# Patient Record
Sex: FEMALE | Race: WHITE | Employment: OTHER
[De-identification: names, ages, dates, MRNs, and addresses within clinical notes are randomized per-mention and may not be internally consistent; named-entity substitution may affect disease eponyms.]

---

## 2018-01-01 ENCOUNTER — HOME CARE VISIT (OUTPATIENT)
Dept: SCHEDULING | Facility: HOME HEALTH | Age: 83
End: 2018-01-01
Payer: MEDICARE

## 2018-01-01 ENCOUNTER — HOSPICE ADMISSION (OUTPATIENT)
Dept: HOSPICE | Facility: HOSPICE | Age: 83
End: 2018-01-01
Payer: MEDICARE

## 2018-01-01 ENCOUNTER — HOME CARE VISIT (OUTPATIENT)
Dept: HOME HEALTH SERVICES | Facility: HOME HEALTH | Age: 83
End: 2018-01-01
Payer: MEDICARE

## 2018-01-01 ENCOUNTER — APPOINTMENT (OUTPATIENT)
Dept: ULTRASOUND IMAGING | Age: 83
DRG: 871 | End: 2018-01-01
Attending: INTERNAL MEDICINE
Payer: MEDICARE

## 2018-01-01 ENCOUNTER — APPOINTMENT (OUTPATIENT)
Dept: GENERAL RADIOLOGY | Age: 83
DRG: 871 | End: 2018-01-01
Attending: INTERNAL MEDICINE
Payer: MEDICARE

## 2018-01-01 ENCOUNTER — HOME CARE VISIT (OUTPATIENT)
Dept: HOSPICE | Facility: HOSPICE | Age: 83
End: 2018-01-01
Payer: MEDICARE

## 2018-01-01 ENCOUNTER — APPOINTMENT (OUTPATIENT)
Dept: GENERAL RADIOLOGY | Age: 83
DRG: 871 | End: 2018-01-01
Attending: EMERGENCY MEDICINE
Payer: MEDICARE

## 2018-01-01 ENCOUNTER — HOME HEALTH ADMISSION (OUTPATIENT)
Dept: HOME HEALTH SERVICES | Facility: HOME HEALTH | Age: 83
End: 2018-01-01
Payer: MEDICARE

## 2018-01-01 ENCOUNTER — HOSPITAL ENCOUNTER (INPATIENT)
Age: 83
LOS: 8 days | Discharge: HOSPICE | DRG: 871 | End: 2018-03-02
Attending: EMERGENCY MEDICINE | Admitting: HOSPITALIST
Payer: MEDICARE

## 2018-01-01 VITALS
TEMPERATURE: 98.2 F | HEART RATE: 90 BPM | DIASTOLIC BLOOD PRESSURE: 50 MMHG | SYSTOLIC BLOOD PRESSURE: 80 MMHG | OXYGEN SATURATION: 82 %

## 2018-01-01 VITALS
SYSTOLIC BLOOD PRESSURE: 110 MMHG | DIASTOLIC BLOOD PRESSURE: 56 MMHG | RESPIRATION RATE: 14 BRPM | HEART RATE: 88 BPM | OXYGEN SATURATION: 97 % | TEMPERATURE: 97.2 F

## 2018-01-01 VITALS
SYSTOLIC BLOOD PRESSURE: 118 MMHG | HEART RATE: 94 BPM | TEMPERATURE: 98.6 F | OXYGEN SATURATION: 72 % | DIASTOLIC BLOOD PRESSURE: 70 MMHG

## 2018-01-01 VITALS
DIASTOLIC BLOOD PRESSURE: 63 MMHG | OXYGEN SATURATION: 94 % | SYSTOLIC BLOOD PRESSURE: 116 MMHG | HEART RATE: 102 BPM | TEMPERATURE: 99.1 F

## 2018-01-01 VITALS
OXYGEN SATURATION: 97 % | TEMPERATURE: 98.2 F | RESPIRATION RATE: 16 BRPM | DIASTOLIC BLOOD PRESSURE: 56 MMHG | HEART RATE: 88 BPM | SYSTOLIC BLOOD PRESSURE: 110 MMHG

## 2018-01-01 VITALS
OXYGEN SATURATION: 95 % | HEART RATE: 86 BPM | DIASTOLIC BLOOD PRESSURE: 78 MMHG | SYSTOLIC BLOOD PRESSURE: 147 MMHG | RESPIRATION RATE: 18 BRPM | TEMPERATURE: 97.5 F | HEIGHT: 65 IN | WEIGHT: 108.5 LBS | BODY MASS INDEX: 18.08 KG/M2

## 2018-01-01 VITALS
OXYGEN SATURATION: 91 % | RESPIRATION RATE: 16 BRPM | SYSTOLIC BLOOD PRESSURE: 120 MMHG | TEMPERATURE: 97.1 F | DIASTOLIC BLOOD PRESSURE: 60 MMHG | HEART RATE: 67 BPM

## 2018-01-01 VITALS
OXYGEN SATURATION: 98 % | HEART RATE: 88 BPM | SYSTOLIC BLOOD PRESSURE: 116 MMHG | DIASTOLIC BLOOD PRESSURE: 80 MMHG | TEMPERATURE: 98.5 F

## 2018-01-01 VITALS
OXYGEN SATURATION: 95 % | DIASTOLIC BLOOD PRESSURE: 60 MMHG | TEMPERATURE: 99.3 F | SYSTOLIC BLOOD PRESSURE: 103 MMHG | HEART RATE: 88 BPM

## 2018-01-01 DIAGNOSIS — R79.89 ELEVATED BRAIN NATRIURETIC PEPTIDE (BNP) LEVEL: ICD-10-CM

## 2018-01-01 DIAGNOSIS — R57.9 SHOCK (HCC): ICD-10-CM

## 2018-01-01 DIAGNOSIS — R50.9 FEVER, UNSPECIFIED FEVER CAUSE: Primary | ICD-10-CM

## 2018-01-01 DIAGNOSIS — R77.8 ELEVATED TROPONIN: ICD-10-CM

## 2018-01-01 DIAGNOSIS — R94.31 ABNORMAL EKG: ICD-10-CM

## 2018-01-01 DIAGNOSIS — F03.91 DEMENTIA WITH BEHAVIORAL DISTURBANCE, UNSPECIFIED DEMENTIA TYPE: Chronic | ICD-10-CM

## 2018-01-01 DIAGNOSIS — I95.9 HYPOTENSION, UNSPECIFIED HYPOTENSION TYPE: ICD-10-CM

## 2018-01-01 DIAGNOSIS — D72.829 LEUKOCYTOSIS, UNSPECIFIED TYPE: ICD-10-CM

## 2018-01-01 DIAGNOSIS — R53.81 DEBILITY: ICD-10-CM

## 2018-01-01 DIAGNOSIS — J90 PLEURAL EFFUSION, LEFT: ICD-10-CM

## 2018-01-01 DIAGNOSIS — Z71.89 ACP (ADVANCE CARE PLANNING): ICD-10-CM

## 2018-01-01 DIAGNOSIS — R74.02 ELEVATED SERUM LACTATE DEHYDROGENASE: ICD-10-CM

## 2018-01-01 DIAGNOSIS — R53.1 GENERALIZED WEAKNESS: ICD-10-CM

## 2018-01-01 DIAGNOSIS — I82.412 ACUTE DEEP VEIN THROMBOSIS (DVT) OF FEMORAL VEIN OF LEFT LOWER EXTREMITY (HCC): ICD-10-CM

## 2018-01-01 LAB
ALBUMIN SERPL-MCNC: 1.4 G/DL (ref 3.4–5)
ALBUMIN SERPL-MCNC: 1.4 G/DL (ref 3.4–5)
ALBUMIN SERPL-MCNC: 1.5 G/DL (ref 3.4–5)
ALBUMIN SERPL-MCNC: 1.5 G/DL (ref 3.4–5)
ALBUMIN SERPL-MCNC: 1.7 G/DL (ref 3.4–5)
ALBUMIN SERPL-MCNC: 1.8 G/DL (ref 3.4–5)
ALBUMIN SERPL-MCNC: 2 G/DL (ref 3.4–5)
ALBUMIN/GLOB SERPL: 0.4 {RATIO} (ref 0.8–1.7)
ALBUMIN/GLOB SERPL: 0.4 {RATIO} (ref 0.8–1.7)
ALBUMIN/GLOB SERPL: 0.5 {RATIO} (ref 0.8–1.7)
ALBUMIN/GLOB SERPL: 0.6 {RATIO} (ref 0.8–1.7)
ALP SERPL-CCNC: 119 U/L (ref 45–117)
ALP SERPL-CCNC: 128 U/L (ref 45–117)
ALP SERPL-CCNC: 129 U/L (ref 45–117)
ALP SERPL-CCNC: 130 U/L (ref 45–117)
ALP SERPL-CCNC: 155 U/L (ref 45–117)
ALP SERPL-CCNC: 165 U/L (ref 45–117)
ALP SERPL-CCNC: 193 U/L (ref 45–117)
ALT SERPL-CCNC: 13 U/L (ref 13–56)
ALT SERPL-CCNC: 15 U/L (ref 13–56)
ALT SERPL-CCNC: 16 U/L (ref 13–56)
ALT SERPL-CCNC: 17 U/L (ref 13–56)
ALT SERPL-CCNC: 27 U/L (ref 13–56)
AMORPH CRY URNS QL MICRO: ABNORMAL
ANION GAP SERPL CALC-SCNC: 10 MMOL/L (ref 3–18)
ANION GAP SERPL CALC-SCNC: 12 MMOL/L (ref 3–18)
ANION GAP SERPL CALC-SCNC: 13 MMOL/L (ref 3–18)
ANION GAP SERPL CALC-SCNC: 8 MMOL/L (ref 3–18)
ANION GAP SERPL CALC-SCNC: 8 MMOL/L (ref 3–18)
ANION GAP SERPL CALC-SCNC: 9 MMOL/L (ref 3–18)
APPEARANCE UR: ABNORMAL
APPEARANCE UR: CLEAR
APTT PPP: 110.8 SEC (ref 23–36.4)
APTT PPP: 112 SEC (ref 23–36.4)
APTT PPP: 117.6 SEC (ref 23–36.4)
APTT PPP: 124.2 SEC (ref 23–36.4)
APTT PPP: 136.9 SEC (ref 23–36.4)
APTT PPP: 150.3 SEC (ref 23–36.4)
APTT PPP: 34.6 SEC (ref 23–36.4)
APTT PPP: 55.3 SEC (ref 23–36.4)
APTT PPP: 58.2 SEC (ref 23–36.4)
APTT PPP: 88.9 SEC (ref 23–36.4)
APTT PPP: 89.3 SEC (ref 23–36.4)
APTT PPP: 92.6 SEC (ref 23–36.4)
APTT PPP: >180 SEC (ref 23–36.4)
ARTERIAL PATENCY WRIST A: YES
AST SERPL-CCNC: 24 U/L (ref 15–37)
AST SERPL-CCNC: 24 U/L (ref 15–37)
AST SERPL-CCNC: 25 U/L (ref 15–37)
AST SERPL-CCNC: 25 U/L (ref 15–37)
AST SERPL-CCNC: 26 U/L (ref 15–37)
AST SERPL-CCNC: 32 U/L (ref 15–37)
AST SERPL-CCNC: 54 U/L (ref 15–37)
ATRIAL RATE: 105 BPM
ATRIAL RATE: 19 BPM
ATRIAL RATE: 96 BPM
BACTERIA SPEC CULT: NORMAL
BACTERIA URNS QL MICRO: ABNORMAL /HPF
BACTERIA URNS QL MICRO: ABNORMAL /HPF
BASE DEFICIT BLDV-SCNC: 4 MMOL/L
BASOPHILS # BLD: 0 K/UL (ref 0–0.06)
BASOPHILS # BLD: 0 K/UL (ref 0–0.1)
BASOPHILS # BLD: 0 K/UL (ref 0–0.1)
BASOPHILS NFR BLD: 0 % (ref 0–2)
BASOPHILS NFR BLD: 0 % (ref 0–3)
BASOPHILS NFR BLD: 0 % (ref 0–3)
BDY SITE: ABNORMAL
BILIRUB SERPL-MCNC: 0.2 MG/DL (ref 0.2–1)
BILIRUB SERPL-MCNC: 0.3 MG/DL (ref 0.2–1)
BILIRUB SERPL-MCNC: 0.4 MG/DL (ref 0.2–1)
BILIRUB SERPL-MCNC: 0.5 MG/DL (ref 0.2–1)
BILIRUB SERPL-MCNC: 0.6 MG/DL (ref 0.2–1)
BILIRUB UR QL: ABNORMAL
BILIRUB UR QL: NEGATIVE
BNP SERPL-MCNC: ABNORMAL PG/ML (ref 0–1800)
BODY TEMPERATURE: 99.1
BUN SERPL-MCNC: 11 MG/DL (ref 7–18)
BUN SERPL-MCNC: 16 MG/DL (ref 7–18)
BUN SERPL-MCNC: 16 MG/DL (ref 7–18)
BUN SERPL-MCNC: 21 MG/DL (ref 7–18)
BUN SERPL-MCNC: 26 MG/DL (ref 7–18)
BUN SERPL-MCNC: 29 MG/DL (ref 7–18)
BUN SERPL-MCNC: 6 MG/DL (ref 7–18)
BUN SERPL-MCNC: 7 MG/DL (ref 7–18)
BUN/CREAT SERPL: 13 (ref 12–20)
BUN/CREAT SERPL: 18 (ref 12–20)
BUN/CREAT SERPL: 18 (ref 12–20)
BUN/CREAT SERPL: 21 (ref 12–20)
BUN/CREAT SERPL: 21 (ref 12–20)
BUN/CREAT SERPL: 22 (ref 12–20)
BUN/CREAT SERPL: 23 (ref 12–20)
BUN/CREAT SERPL: 25 (ref 12–20)
CA-I SERPL-SCNC: 0.93 MMOL/L (ref 1.12–1.32)
CA-I SERPL-SCNC: 1.13 MMOL/L (ref 1.12–1.32)
CALCIUM SERPL-MCNC: 7 MG/DL (ref 8.5–10.1)
CALCIUM SERPL-MCNC: 7.1 MG/DL (ref 8.5–10.1)
CALCIUM SERPL-MCNC: 7.1 MG/DL (ref 8.5–10.1)
CALCIUM SERPL-MCNC: 7.2 MG/DL (ref 8.5–10.1)
CALCIUM SERPL-MCNC: 7.2 MG/DL (ref 8.5–10.1)
CALCIUM SERPL-MCNC: 7.5 MG/DL (ref 8.5–10.1)
CALCIUM SERPL-MCNC: 7.5 MG/DL (ref 8.5–10.1)
CALCIUM SERPL-MCNC: 7.9 MG/DL (ref 8.5–10.1)
CALCULATED P AXIS, ECG09: 75 DEGREES
CALCULATED P AXIS, ECG09: 76 DEGREES
CALCULATED R AXIS, ECG10: -56 DEGREES
CALCULATED R AXIS, ECG10: -59 DEGREES
CALCULATED R AXIS, ECG10: -60 DEGREES
CALCULATED T AXIS, ECG11: 63 DEGREES
CALCULATED T AXIS, ECG11: 81 DEGREES
CALCULATED T AXIS, ECG11: 95 DEGREES
CHLORIDE SERPL-SCNC: 106 MMOL/L (ref 100–108)
CHLORIDE SERPL-SCNC: 113 MMOL/L (ref 100–108)
CHLORIDE SERPL-SCNC: 115 MMOL/L (ref 100–108)
CHLORIDE SERPL-SCNC: 117 MMOL/L (ref 100–108)
CHLORIDE SERPL-SCNC: 118 MMOL/L (ref 100–108)
CHLORIDE SERPL-SCNC: 119 MMOL/L (ref 100–108)
CK MB CFR SERPL CALC: 1.8 % (ref 0–4)
CK MB CFR SERPL CALC: 2.5 % (ref 0–4)
CK MB CFR SERPL CALC: ABNORMAL % (ref 0–4)
CK MB SERPL-MCNC: 1.4 NG/ML (ref 5–25)
CK MB SERPL-MCNC: 1.6 NG/ML (ref 5–25)
CK MB SERPL-MCNC: <1 NG/ML (ref 5–25)
CK SERPL-CCNC: 47 U/L (ref 26–192)
CK SERPL-CCNC: 57 U/L (ref 26–192)
CK SERPL-CCNC: 89 U/L (ref 26–192)
CO2 SERPL-SCNC: 17 MMOL/L (ref 21–32)
CO2 SERPL-SCNC: 18 MMOL/L (ref 21–32)
CO2 SERPL-SCNC: 21 MMOL/L (ref 21–32)
CO2 SERPL-SCNC: 22 MMOL/L (ref 21–32)
CO2 SERPL-SCNC: 26 MMOL/L (ref 21–32)
COLOR UR: ABNORMAL
COLOR UR: ABNORMAL
CORTIS AM PEAK SERPL-MCNC: 14.9 UG/DL (ref 4.3–22.4)
CREAT SERPL-MCNC: 0.39 MG/DL (ref 0.6–1.3)
CREAT SERPL-MCNC: 0.48 MG/DL (ref 0.6–1.3)
CREAT SERPL-MCNC: 0.53 MG/DL (ref 0.6–1.3)
CREAT SERPL-MCNC: 0.77 MG/DL (ref 0.6–1.3)
CREAT SERPL-MCNC: 0.91 MG/DL (ref 0.6–1.3)
CREAT SERPL-MCNC: 0.92 MG/DL (ref 0.6–1.3)
CREAT SERPL-MCNC: 1.06 MG/DL (ref 0.6–1.3)
CREAT SERPL-MCNC: 1.32 MG/DL (ref 0.6–1.3)
DATE LAST DOSE: ABNORMAL
DATE LAST DOSE: ABNORMAL
DATE LAST DOSE: NORMAL
DATE LAST DOSE: NORMAL
DIAGNOSIS, 93000: NORMAL
DIFFERENTIAL METHOD BLD: ABNORMAL
EOSINOPHIL # BLD: 0.3 K/UL (ref 0–0.4)
EOSINOPHIL # BLD: 0.6 K/UL (ref 0–0.4)
EOSINOPHIL # BLD: 0.7 K/UL (ref 0–0.4)
EOSINOPHIL # BLD: 0.8 K/UL (ref 0–0.4)
EOSINOPHIL # BLD: 0.8 K/UL (ref 0–0.4)
EOSINOPHIL NFR BLD: 2 % (ref 0–5)
EOSINOPHIL NFR BLD: 4 % (ref 0–5)
EOSINOPHIL NFR BLD: 5 % (ref 0–5)
EOSINOPHIL NFR BLD: 5 % (ref 0–5)
EOSINOPHIL NFR BLD: 8 % (ref 0–5)
EPITH CASTS URNS QL MICRO: ABNORMAL /LPF (ref 0–5)
EPITH CASTS URNS QL MICRO: ABNORMAL /LPF (ref 0–5)
ERYTHROCYTE [DISTWIDTH] IN BLOOD BY AUTOMATED COUNT: 16.1 % (ref 11.6–14.5)
ERYTHROCYTE [DISTWIDTH] IN BLOOD BY AUTOMATED COUNT: 16.2 % (ref 11.6–14.5)
ERYTHROCYTE [DISTWIDTH] IN BLOOD BY AUTOMATED COUNT: 16.3 % (ref 11.6–14.5)
ERYTHROCYTE [DISTWIDTH] IN BLOOD BY AUTOMATED COUNT: 16.4 % (ref 11.6–14.5)
ERYTHROCYTE [DISTWIDTH] IN BLOOD BY AUTOMATED COUNT: 16.5 % (ref 11.6–14.5)
ERYTHROCYTE [DISTWIDTH] IN BLOOD BY AUTOMATED COUNT: 16.6 % (ref 11.6–14.5)
ERYTHROCYTE [DISTWIDTH] IN BLOOD BY AUTOMATED COUNT: 16.8 % (ref 11.6–14.5)
ERYTHROCYTE [DISTWIDTH] IN BLOOD BY AUTOMATED COUNT: 17 % (ref 11.6–14.5)
FLUAV AG NPH QL IA: NEGATIVE
FLUBV AG NOSE QL IA: NEGATIVE
GAS FLOW.O2 O2 DELIVERY SYS: ABNORMAL L/MIN
GAS FLOW.O2 SETTING OXYMISER: 3 L/M
GLOBULIN SER CALC-MCNC: 3 G/DL (ref 2–4)
GLOBULIN SER CALC-MCNC: 3.2 G/DL (ref 2–4)
GLOBULIN SER CALC-MCNC: 3.2 G/DL (ref 2–4)
GLOBULIN SER CALC-MCNC: 3.3 G/DL (ref 2–4)
GLOBULIN SER CALC-MCNC: 3.3 G/DL (ref 2–4)
GLOBULIN SER CALC-MCNC: 3.4 G/DL (ref 2–4)
GLOBULIN SER CALC-MCNC: 4.4 G/DL (ref 2–4)
GLUCOSE BLD STRIP.AUTO-MCNC: 113 MG/DL (ref 70–110)
GLUCOSE SERPL-MCNC: 106 MG/DL (ref 74–99)
GLUCOSE SERPL-MCNC: 107 MG/DL (ref 74–99)
GLUCOSE SERPL-MCNC: 114 MG/DL (ref 74–99)
GLUCOSE SERPL-MCNC: 116 MG/DL (ref 74–99)
GLUCOSE SERPL-MCNC: 70 MG/DL (ref 74–99)
GLUCOSE SERPL-MCNC: 76 MG/DL (ref 74–99)
GLUCOSE SERPL-MCNC: 78 MG/DL (ref 74–99)
GLUCOSE SERPL-MCNC: 95 MG/DL (ref 74–99)
GLUCOSE UR STRIP.AUTO-MCNC: NEGATIVE MG/DL
GLUCOSE UR STRIP.AUTO-MCNC: NEGATIVE MG/DL
HCO3 BLDV-SCNC: 21 MMOL/L (ref 23–28)
HCT VFR BLD AUTO: 23.6 % (ref 35–45)
HCT VFR BLD AUTO: 25.5 % (ref 35–45)
HCT VFR BLD AUTO: 26.3 % (ref 35–45)
HCT VFR BLD AUTO: 28.5 % (ref 35–45)
HCT VFR BLD AUTO: 29.6 % (ref 35–45)
HCT VFR BLD AUTO: 30.7 % (ref 35–45)
HCT VFR BLD AUTO: 31 % (ref 35–45)
HCT VFR BLD AUTO: 34.2 % (ref 35–45)
HGB BLD-MCNC: 11 G/DL (ref 12–16)
HGB BLD-MCNC: 7.4 G/DL (ref 12–16)
HGB BLD-MCNC: 8.1 G/DL (ref 12–16)
HGB BLD-MCNC: 8.4 G/DL (ref 12–16)
HGB BLD-MCNC: 9.3 G/DL (ref 12–16)
HGB BLD-MCNC: 9.4 G/DL (ref 12–16)
HGB BLD-MCNC: 9.7 G/DL (ref 12–16)
HGB BLD-MCNC: 9.8 G/DL (ref 12–16)
HGB UR QL STRIP: NEGATIVE
HGB UR QL STRIP: NEGATIVE
INR PPP: 1.4 (ref 0.8–1.2)
INR PPP: 2.7 (ref 0.8–1.2)
INR PPP: 3 (ref 0.8–1.2)
KETONES UR QL STRIP.AUTO: ABNORMAL MG/DL
KETONES UR QL STRIP.AUTO: NEGATIVE MG/DL
LACTATE BLD-SCNC: 2.5 MMOL/L (ref 0.4–2)
LACTATE BLD-SCNC: 3.8 MMOL/L (ref 0.4–2)
LACTATE SERPL-SCNC: 1 MMOL/L (ref 0.4–2)
LEUKOCYTE ESTERASE UR QL STRIP.AUTO: ABNORMAL
LEUKOCYTE ESTERASE UR QL STRIP.AUTO: NEGATIVE
LYMPHOCYTES # BLD: 1.6 K/UL (ref 0.9–3.6)
LYMPHOCYTES # BLD: 1.7 K/UL (ref 0.9–3.6)
LYMPHOCYTES # BLD: 2 K/UL (ref 0.8–3.5)
LYMPHOCYTES # BLD: 2.1 K/UL (ref 0.9–3.6)
LYMPHOCYTES # BLD: 2.4 K/UL (ref 0.8–3.5)
LYMPHOCYTES NFR BLD: 13 % (ref 20–51)
LYMPHOCYTES NFR BLD: 16 % (ref 20–51)
LYMPHOCYTES NFR BLD: 18 % (ref 21–52)
LYMPHOCYTES NFR BLD: 19 % (ref 21–52)
LYMPHOCYTES NFR BLD: 8 % (ref 21–52)
MAGNESIUM SERPL-MCNC: 1.7 MG/DL (ref 1.6–2.6)
MAGNESIUM SERPL-MCNC: 1.7 MG/DL (ref 1.6–2.6)
MAGNESIUM SERPL-MCNC: 2 MG/DL (ref 1.6–2.6)
MAGNESIUM SERPL-MCNC: 2.4 MG/DL (ref 1.6–2.6)
MCH RBC QN AUTO: 27 PG (ref 24–34)
MCH RBC QN AUTO: 27.4 PG (ref 24–34)
MCH RBC QN AUTO: 27.4 PG (ref 24–34)
MCH RBC QN AUTO: 27.6 PG (ref 24–34)
MCH RBC QN AUTO: 27.6 PG (ref 24–34)
MCH RBC QN AUTO: 28 PG (ref 24–34)
MCH RBC QN AUTO: 28 PG (ref 24–34)
MCH RBC QN AUTO: 28.1 PG (ref 24–34)
MCHC RBC AUTO-ENTMCNC: 31.4 G/DL (ref 31–37)
MCHC RBC AUTO-ENTMCNC: 31.6 G/DL (ref 31–37)
MCHC RBC AUTO-ENTMCNC: 31.6 G/DL (ref 31–37)
MCHC RBC AUTO-ENTMCNC: 31.8 G/DL (ref 31–37)
MCHC RBC AUTO-ENTMCNC: 31.8 G/DL (ref 31–37)
MCHC RBC AUTO-ENTMCNC: 31.9 G/DL (ref 31–37)
MCHC RBC AUTO-ENTMCNC: 32.2 G/DL (ref 31–37)
MCHC RBC AUTO-ENTMCNC: 32.6 G/DL (ref 31–37)
MCV RBC AUTO: 85.7 FL (ref 74–97)
MCV RBC AUTO: 85.8 FL (ref 74–97)
MCV RBC AUTO: 86.1 FL (ref 74–97)
MCV RBC AUTO: 86.1 FL (ref 74–97)
MCV RBC AUTO: 87.3 FL (ref 74–97)
MCV RBC AUTO: 87.5 FL (ref 74–97)
MCV RBC AUTO: 87.5 FL (ref 74–97)
MCV RBC AUTO: 88.1 FL (ref 74–97)
MONOCYTES # BLD: 0.6 K/UL (ref 0.05–1.2)
MONOCYTES # BLD: 0.6 K/UL (ref 0–1)
MONOCYTES # BLD: 0.8 K/UL (ref 0.05–1.2)
MONOCYTES # BLD: 0.9 K/UL (ref 0–1)
MONOCYTES # BLD: 1 K/UL (ref 0.05–1.2)
MONOCYTES NFR BLD: 4 % (ref 2–9)
MONOCYTES NFR BLD: 4 % (ref 3–10)
MONOCYTES NFR BLD: 6 % (ref 2–9)
MONOCYTES NFR BLD: 7 % (ref 3–10)
MONOCYTES NFR BLD: 9 % (ref 3–10)
MYELOCYTES NFR BLD MANUAL: 1 %
NEUTS SEG # BLD: 11 K/UL (ref 1.8–8)
NEUTS SEG # BLD: 12.6 K/UL (ref 1.8–8)
NEUTS SEG # BLD: 18.1 K/UL (ref 1.8–8)
NEUTS SEG # BLD: 5.7 K/UL (ref 1.8–8)
NEUTS SEG # BLD: 7.3 K/UL (ref 1.8–8)
NEUTS SEG NFR BLD: 67 % (ref 40–73)
NEUTS SEG NFR BLD: 67 % (ref 40–73)
NEUTS SEG NFR BLD: 72 % (ref 42–75)
NEUTS SEG NFR BLD: 81 % (ref 42–75)
NEUTS SEG NFR BLD: 84 % (ref 40–73)
NITRITE UR QL STRIP.AUTO: NEGATIVE
NITRITE UR QL STRIP.AUTO: NEGATIVE
O2/TOTAL GAS SETTING VFR VENT: 0.32 %
P-R INTERVAL, ECG05: 138 MS
P-R INTERVAL, ECG05: 152 MS
PCO2 BLDV: 36.7 MMHG (ref 41–51)
PH BLDV: 7.37 [PH] (ref 7.32–7.42)
PH UR STRIP: 5 [PH] (ref 5–8)
PH UR STRIP: 5 [PH] (ref 5–8)
PHOSPHATE SERPL-MCNC: 2.2 MG/DL (ref 2.5–4.9)
PHOSPHATE SERPL-MCNC: 2.3 MG/DL (ref 2.5–4.9)
PHOSPHATE SERPL-MCNC: 2.4 MG/DL (ref 2.5–4.9)
PHOSPHATE SERPL-MCNC: 2.6 MG/DL (ref 2.5–4.9)
PHOSPHATE SERPL-MCNC: 3.6 MG/DL (ref 2.5–4.9)
PLATELET # BLD AUTO: 200 K/UL (ref 135–420)
PLATELET # BLD AUTO: 212 K/UL (ref 135–420)
PLATELET # BLD AUTO: 230 K/UL (ref 135–420)
PLATELET # BLD AUTO: 233 K/UL (ref 135–420)
PLATELET # BLD AUTO: 246 K/UL (ref 135–420)
PLATELET # BLD AUTO: 249 K/UL (ref 135–420)
PLATELET # BLD AUTO: 257 K/UL (ref 135–420)
PLATELET # BLD AUTO: 261 K/UL (ref 135–420)
PMV BLD AUTO: 11.5 FL (ref 9.2–11.8)
PMV BLD AUTO: 11.8 FL (ref 9.2–11.8)
PMV BLD AUTO: 11.8 FL (ref 9.2–11.8)
PMV BLD AUTO: 12 FL (ref 9.2–11.8)
PMV BLD AUTO: 12.7 FL (ref 9.2–11.8)
PMV BLD AUTO: 12.7 FL (ref 9.2–11.8)
PMV BLD AUTO: 13.6 FL (ref 9.2–11.8)
PMV BLD AUTO: 14 FL (ref 9.2–11.8)
PO2 BLDV: 30 MMHG (ref 25–40)
POTASSIUM SERPL-SCNC: 3.4 MMOL/L (ref 3.5–5.5)
POTASSIUM SERPL-SCNC: 3.5 MMOL/L (ref 3.5–5.5)
POTASSIUM SERPL-SCNC: 3.5 MMOL/L (ref 3.5–5.5)
POTASSIUM SERPL-SCNC: 3.6 MMOL/L (ref 3.5–5.5)
POTASSIUM SERPL-SCNC: 3.6 MMOL/L (ref 3.5–5.5)
POTASSIUM SERPL-SCNC: 3.7 MMOL/L (ref 3.5–5.5)
POTASSIUM SERPL-SCNC: 3.8 MMOL/L (ref 3.5–5.5)
POTASSIUM SERPL-SCNC: 3.9 MMOL/L (ref 3.5–5.5)
POTASSIUM SERPL-SCNC: 3.9 MMOL/L (ref 3.5–5.5)
POTASSIUM SERPL-SCNC: 4.3 MMOL/L (ref 3.5–5.5)
PROCALCITONIN SERPL-MCNC: 0.89 NG/ML (ref 0–0.08)
PROT SERPL-MCNC: 4.6 G/DL (ref 6.4–8.2)
PROT SERPL-MCNC: 4.8 G/DL (ref 6.4–8.2)
PROT SERPL-MCNC: 4.9 G/DL (ref 6.4–8.2)
PROT SERPL-MCNC: 6.4 G/DL (ref 6.4–8.2)
PROT UR STRIP-MCNC: 30 MG/DL
PROT UR STRIP-MCNC: ABNORMAL MG/DL
PROTHROMBIN TIME: 17 SEC (ref 11.5–15.2)
PROTHROMBIN TIME: 27.8 SEC (ref 11.5–15.2)
PROTHROMBIN TIME: 30.7 SEC (ref 11.5–15.2)
Q-T INTERVAL, ECG07: 278 MS
Q-T INTERVAL, ECG07: 352 MS
Q-T INTERVAL, ECG07: 424 MS
QRS DURATION, ECG06: 80 MS
QRS DURATION, ECG06: 86 MS
QRS DURATION, ECG06: 88 MS
QTC CALCULATION (BEZET), ECG08: 465 MS
QTC CALCULATION (BEZET), ECG08: 470 MS
QTC CALCULATION (BEZET), ECG08: 535 MS
RBC # BLD AUTO: 2.74 M/UL (ref 4.2–5.3)
RBC # BLD AUTO: 2.96 M/UL (ref 4.2–5.3)
RBC # BLD AUTO: 3.07 M/UL (ref 4.2–5.3)
RBC # BLD AUTO: 3.32 M/UL (ref 4.2–5.3)
RBC # BLD AUTO: 3.36 M/UL (ref 4.2–5.3)
RBC # BLD AUTO: 3.51 M/UL (ref 4.2–5.3)
RBC # BLD AUTO: 3.55 M/UL (ref 4.2–5.3)
RBC # BLD AUTO: 3.91 M/UL (ref 4.2–5.3)
RBC #/AREA URNS HPF: 0 /HPF (ref 0–5)
RBC #/AREA URNS HPF: ABNORMAL /HPF (ref 0–5)
RBC MORPH BLD: ABNORMAL
RBC MORPH BLD: ABNORMAL
REPORTED DOSE,DOSE: ABNORMAL UNITS
REPORTED DOSE,DOSE: ABNORMAL UNITS
REPORTED DOSE,DOSE: NORMAL UNITS
REPORTED DOSE,DOSE: NORMAL UNITS
REPORTED DOSE/TIME,TMG: 1200
REPORTED DOSE/TIME,TMG: 510
REPORTED DOSE/TIME,TMG: ABNORMAL
REPORTED DOSE/TIME,TMG: NORMAL
SAO2 % BLDV: 55 % (ref 65–88)
SERVICE CMNT-IMP: ABNORMAL
SERVICE CMNT-IMP: NORMAL
SODIUM SERPL-SCNC: 142 MMOL/L (ref 136–145)
SODIUM SERPL-SCNC: 146 MMOL/L (ref 136–145)
SODIUM SERPL-SCNC: 147 MMOL/L (ref 136–145)
SODIUM SERPL-SCNC: 148 MMOL/L (ref 136–145)
SP GR UR REFRACTOMETRY: 1.03 (ref 1–1.03)
SP GR UR REFRACTOMETRY: 1.03 (ref 1–1.03)
SPECIMEN TYPE: ABNORMAL
T4 FREE SERPL-MCNC: 0.9 NG/DL (ref 0.7–1.5)
TOTAL RESP. RATE, ITRR: 22
TROPONIN I SERPL-MCNC: 0.62 NG/ML (ref 0–0.04)
TROPONIN I SERPL-MCNC: 0.73 NG/ML (ref 0–0.04)
TROPONIN I SERPL-MCNC: 0.76 NG/ML (ref 0–0.04)
TSH SERPL DL<=0.05 MIU/L-ACNC: 5.37 UIU/ML (ref 0.36–3.74)
URATE CRY URNS QL MICRO: ABNORMAL
UROBILINOGEN UR QL STRIP.AUTO: 0.2 EU/DL (ref 0.2–1)
UROBILINOGEN UR QL STRIP.AUTO: 1 EU/DL (ref 0.2–1)
VANCOMYCIN TROUGH SERPL-MCNC: 17.6 UG/ML (ref 10–20)
VANCOMYCIN TROUGH SERPL-MCNC: 19.8 UG/ML (ref 10–20)
VANCOMYCIN TROUGH SERPL-MCNC: 22.2 UG/ML (ref 10–20)
VANCOMYCIN TROUGH SERPL-MCNC: 6.2 UG/ML (ref 10–20)
VENTRICULAR RATE, ECG03: 105 BPM
VENTRICULAR RATE, ECG03: 172 BPM
VENTRICULAR RATE, ECG03: 96 BPM
WBC # BLD AUTO: 10.9 K/UL (ref 4.6–13.2)
WBC # BLD AUTO: 15.3 K/UL (ref 4.6–13.2)
WBC # BLD AUTO: 15.5 K/UL (ref 4.6–13.2)
WBC # BLD AUTO: 17.4 K/UL (ref 4.6–13.2)
WBC # BLD AUTO: 18.1 K/UL (ref 4.6–13.2)
WBC # BLD AUTO: 21.4 K/UL (ref 4.6–13.2)
WBC # BLD AUTO: 27.5 K/UL (ref 4.6–13.2)
WBC # BLD AUTO: 8.5 K/UL (ref 4.6–13.2)
WBC URNS QL MICRO: ABNORMAL /HPF (ref 0–4)
WBC URNS QL MICRO: ABNORMAL /HPF (ref 0–4)

## 2018-01-01 PROCEDURE — 75810000137 HC PLCMT CENT VENOUS CATH

## 2018-01-01 PROCEDURE — 77030011943

## 2018-01-01 PROCEDURE — 74011250637 HC RX REV CODE- 250/637: Performed by: NURSE PRACTITIONER

## 2018-01-01 PROCEDURE — 71045 X-RAY EXAM CHEST 1 VIEW: CPT

## 2018-01-01 PROCEDURE — 74011250636 HC RX REV CODE- 250/636: Performed by: INTERNAL MEDICINE

## 2018-01-01 PROCEDURE — A9270 NON-COVERED ITEM OR SERVICE: HCPCS

## 2018-01-01 PROCEDURE — 85027 COMPLETE CBC AUTOMATED: CPT | Performed by: HOSPITALIST

## 2018-01-01 PROCEDURE — 77010033678 HC OXYGEN DAILY

## 2018-01-01 PROCEDURE — 83880 ASSAY OF NATRIURETIC PEPTIDE: CPT | Performed by: HOSPITALIST

## 2018-01-01 PROCEDURE — 96367 TX/PROPH/DG ADDL SEQ IV INF: CPT

## 2018-01-01 PROCEDURE — A6212 FOAM DRG <=16 SQ IN W/BORDER: HCPCS

## 2018-01-01 PROCEDURE — T4541 LARGE DISPOSABLE UNDERPAD: HCPCS

## 2018-01-01 PROCEDURE — 97166 OT EVAL MOD COMPLEX 45 MIN: CPT

## 2018-01-01 PROCEDURE — 0651 HSPC ROUTINE HOME CARE

## 2018-01-01 PROCEDURE — 65660000000 HC RM CCU STEPDOWN

## 2018-01-01 PROCEDURE — 84145 PROCALCITONIN (PCT): CPT | Performed by: NURSE PRACTITIONER

## 2018-01-01 PROCEDURE — T4522 ADULT SIZE BRIEF/DIAPER MED: HCPCS

## 2018-01-01 PROCEDURE — 82803 BLOOD GASES ANY COMBINATION: CPT

## 2018-01-01 PROCEDURE — 36415 COLL VENOUS BLD VENIPUNCTURE: CPT | Performed by: HOSPITALIST

## 2018-01-01 PROCEDURE — 74011250636 HC RX REV CODE- 250/636: Performed by: EMERGENCY MEDICINE

## 2018-01-01 PROCEDURE — 85025 COMPLETE CBC W/AUTO DIFF WBC: CPT | Performed by: HOSPITALIST

## 2018-01-01 PROCEDURE — 74011000258 HC RX REV CODE- 258: Performed by: INTERNAL MEDICINE

## 2018-01-01 PROCEDURE — G0156 HHCP-SVS OF AIDE,EA 15 MIN: HCPCS

## 2018-01-01 PROCEDURE — 83880 ASSAY OF NATRIURETIC PEPTIDE: CPT | Performed by: INTERNAL MEDICINE

## 2018-01-01 PROCEDURE — 74011000250 HC RX REV CODE- 250: Performed by: EMERGENCY MEDICINE

## 2018-01-01 PROCEDURE — 87086 URINE CULTURE/COLONY COUNT: CPT | Performed by: HOSPITALIST

## 2018-01-01 PROCEDURE — 85027 COMPLETE CBC AUTOMATED: CPT | Performed by: EMERGENCY MEDICINE

## 2018-01-01 PROCEDURE — 74011000258 HC RX REV CODE- 258: Performed by: EMERGENCY MEDICINE

## 2018-01-01 PROCEDURE — 84443 ASSAY THYROID STIM HORMONE: CPT | Performed by: EMERGENCY MEDICINE

## 2018-01-01 PROCEDURE — 96365 THER/PROPH/DIAG IV INF INIT: CPT

## 2018-01-01 PROCEDURE — 65610000006 HC RM INTENSIVE CARE

## 2018-01-01 PROCEDURE — 85730 THROMBOPLASTIN TIME PARTIAL: CPT | Performed by: HOSPITALIST

## 2018-01-01 PROCEDURE — 83605 ASSAY OF LACTIC ACID: CPT

## 2018-01-01 PROCEDURE — 80053 COMPREHEN METABOLIC PANEL: CPT | Performed by: HOSPITALIST

## 2018-01-01 PROCEDURE — HOSPICE MEDICATION HC HH HOSPICE MEDICATION

## 2018-01-01 PROCEDURE — 85610 PROTHROMBIN TIME: CPT | Performed by: INTERNAL MEDICINE

## 2018-01-01 PROCEDURE — 74011250637 HC RX REV CODE- 250/637: Performed by: INTERNAL MEDICINE

## 2018-01-01 PROCEDURE — G0152 HHCP-SERV OF OT,EA 15 MIN: HCPCS

## 2018-01-01 PROCEDURE — 74011250636 HC RX REV CODE- 250/636

## 2018-01-01 PROCEDURE — 83735 ASSAY OF MAGNESIUM: CPT | Performed by: HOSPITALIST

## 2018-01-01 PROCEDURE — 82330 ASSAY OF CALCIUM: CPT | Performed by: HOSPITALIST

## 2018-01-01 PROCEDURE — 85610 PROTHROMBIN TIME: CPT | Performed by: HOSPITALIST

## 2018-01-01 PROCEDURE — 87804 INFLUENZA ASSAY W/OPTIC: CPT | Performed by: EMERGENCY MEDICINE

## 2018-01-01 PROCEDURE — 84100 ASSAY OF PHOSPHORUS: CPT | Performed by: HOSPITALIST

## 2018-01-01 PROCEDURE — 77030020263 HC SOL INJ SOD CL0.9% LFCR 1000ML

## 2018-01-01 PROCEDURE — 77030032490 HC SLV COMPR SCD KNE COVD -B

## 2018-01-01 PROCEDURE — 74011250636 HC RX REV CODE- 250/636: Performed by: PHYSICIAN ASSISTANT

## 2018-01-01 PROCEDURE — 96368 THER/DIAG CONCURRENT INF: CPT

## 2018-01-01 PROCEDURE — 84100 ASSAY OF PHOSPHORUS: CPT | Performed by: INTERNAL MEDICINE

## 2018-01-01 PROCEDURE — C9113 INJ PANTOPRAZOLE SODIUM, VIA: HCPCS | Performed by: INTERNAL MEDICINE

## 2018-01-01 PROCEDURE — 74011000250 HC RX REV CODE- 250: Performed by: HOSPITALIST

## 2018-01-01 PROCEDURE — P9045 ALBUMIN (HUMAN), 5%, 250 ML: HCPCS | Performed by: INTERNAL MEDICINE

## 2018-01-01 PROCEDURE — 81001 URINALYSIS AUTO W/SCOPE: CPT | Performed by: EMERGENCY MEDICINE

## 2018-01-01 PROCEDURE — 74011250636 HC RX REV CODE- 250/636: Performed by: NURSE PRACTITIONER

## 2018-01-01 PROCEDURE — 96361 HYDRATE IV INFUSION ADD-ON: CPT

## 2018-01-01 PROCEDURE — 74011250637 HC RX REV CODE- 250/637: Performed by: HOSPITALIST

## 2018-01-01 PROCEDURE — 76450000000

## 2018-01-01 PROCEDURE — G0299 HHS/HOSPICE OF RN EA 15 MIN: HCPCS

## 2018-01-01 PROCEDURE — 93005 ELECTROCARDIOGRAM TRACING: CPT

## 2018-01-01 PROCEDURE — 36591 DRAW BLOOD OFF VENOUS DEVICE: CPT

## 2018-01-01 PROCEDURE — 85027 COMPLETE CBC AUTOMATED: CPT | Performed by: NURSE PRACTITIONER

## 2018-01-01 PROCEDURE — 77030033263 HC DRSG MEPILEX 16-48IN BORD MOLN -B

## 2018-01-01 PROCEDURE — 85730 THROMBOPLASTIN TIME PARTIAL: CPT | Performed by: INTERNAL MEDICINE

## 2018-01-01 PROCEDURE — 81001 URINALYSIS AUTO W/SCOPE: CPT | Performed by: HOSPITALIST

## 2018-01-01 PROCEDURE — 02HV33Z INSERTION OF INFUSION DEVICE INTO SUPERIOR VENA CAVA, PERCUTANEOUS APPROACH: ICD-10-PCS | Performed by: EMERGENCY MEDICINE

## 2018-01-01 PROCEDURE — 36415 COLL VENOUS BLD VENIPUNCTURE: CPT | Performed by: INTERNAL MEDICINE

## 2018-01-01 PROCEDURE — 92526 ORAL FUNCTION THERAPY: CPT

## 2018-01-01 PROCEDURE — G0151 HHCP-SERV OF PT,EA 15 MIN: HCPCS

## 2018-01-01 PROCEDURE — G0157 HHC PT ASSISTANT EA 15: HCPCS

## 2018-01-01 PROCEDURE — 93970 EXTREMITY STUDY: CPT

## 2018-01-01 PROCEDURE — 74011000258 HC RX REV CODE- 258: Performed by: HOSPITALIST

## 2018-01-01 PROCEDURE — 80048 BASIC METABOLIC PNL TOTAL CA: CPT | Performed by: INTERNAL MEDICINE

## 2018-01-01 PROCEDURE — 80202 ASSAY OF VANCOMYCIN: CPT | Performed by: HOSPITALIST

## 2018-01-01 PROCEDURE — 74011250636 HC RX REV CODE- 250/636: Performed by: HOSPITALIST

## 2018-01-01 PROCEDURE — 80053 COMPREHEN METABOLIC PANEL: CPT | Performed by: EMERGENCY MEDICINE

## 2018-01-01 PROCEDURE — 83735 ASSAY OF MAGNESIUM: CPT | Performed by: INTERNAL MEDICINE

## 2018-01-01 PROCEDURE — A6216 NON-STERILE GAUZE<=16 SQ IN: HCPCS

## 2018-01-01 PROCEDURE — 84100 ASSAY OF PHOSPHORUS: CPT | Performed by: NURSE PRACTITIONER

## 2018-01-01 PROCEDURE — 97162 PT EVAL MOD COMPLEX 30 MIN: CPT

## 2018-01-01 PROCEDURE — 77030013797 HC KT TRNSDUC PRSSR EDWD -A

## 2018-01-01 PROCEDURE — 93306 TTE W/DOPPLER COMPLETE: CPT

## 2018-01-01 PROCEDURE — 92610 EVALUATE SWALLOWING FUNCTION: CPT

## 2018-01-01 PROCEDURE — 74011250637 HC RX REV CODE- 250/637: Performed by: EMERGENCY MEDICINE

## 2018-01-01 PROCEDURE — 76700 US EXAM ABDOM COMPLETE: CPT

## 2018-01-01 PROCEDURE — 84132 ASSAY OF SERUM POTASSIUM: CPT | Performed by: HOSPITALIST

## 2018-01-01 PROCEDURE — 83880 ASSAY OF NATRIURETIC PEPTIDE: CPT | Performed by: EMERGENCY MEDICINE

## 2018-01-01 PROCEDURE — 84132 ASSAY OF SERUM POTASSIUM: CPT | Performed by: NURSE PRACTITIONER

## 2018-01-01 PROCEDURE — 97530 THERAPEUTIC ACTIVITIES: CPT

## 2018-01-01 PROCEDURE — 83735 ASSAY OF MAGNESIUM: CPT | Performed by: NURSE PRACTITIONER

## 2018-01-01 PROCEDURE — 74011000250 HC RX REV CODE- 250

## 2018-01-01 PROCEDURE — 84132 ASSAY OF SERUM POTASSIUM: CPT | Performed by: INTERNAL MEDICINE

## 2018-01-01 PROCEDURE — 85025 COMPLETE CBC W/AUTO DIFF WBC: CPT | Performed by: INTERNAL MEDICINE

## 2018-01-01 PROCEDURE — 96366 THER/PROPH/DIAG IV INF ADDON: CPT

## 2018-01-01 PROCEDURE — 84439 ASSAY OF FREE THYROXINE: CPT | Performed by: EMERGENCY MEDICINE

## 2018-01-01 PROCEDURE — 400013 HH SOC

## 2018-01-01 PROCEDURE — 82962 GLUCOSE BLOOD TEST: CPT

## 2018-01-01 PROCEDURE — 82533 TOTAL CORTISOL: CPT | Performed by: NURSE PRACTITIONER

## 2018-01-01 PROCEDURE — 82550 ASSAY OF CK (CPK): CPT | Performed by: EMERGENCY MEDICINE

## 2018-01-01 PROCEDURE — 36415 COLL VENOUS BLD VENIPUNCTURE: CPT | Performed by: NURSE PRACTITIONER

## 2018-01-01 PROCEDURE — 3336500001 HSPC ELECTION

## 2018-01-01 PROCEDURE — C1751 CATH, INF, PER/CENT/MIDLINE: HCPCS

## 2018-01-01 PROCEDURE — A4927 NON-STERILE GLOVES: HCPCS

## 2018-01-01 PROCEDURE — 99285 EMERGENCY DEPT VISIT HI MDM: CPT

## 2018-01-01 PROCEDURE — 36592 COLLECT BLOOD FROM PICC: CPT

## 2018-01-01 PROCEDURE — G0155 HHCP-SVS OF CSW,EA 15 MIN: HCPCS

## 2018-01-01 PROCEDURE — A6260 WOUND CLEANSER ANY TYPE/SIZE: HCPCS

## 2018-01-01 PROCEDURE — 87040 BLOOD CULTURE FOR BACTERIA: CPT | Performed by: EMERGENCY MEDICINE

## 2018-01-01 PROCEDURE — 77030034850

## 2018-01-01 PROCEDURE — 80202 ASSAY OF VANCOMYCIN: CPT | Performed by: EMERGENCY MEDICINE

## 2018-01-01 PROCEDURE — 83605 ASSAY OF LACTIC ACID: CPT | Performed by: INTERNAL MEDICINE

## 2018-01-01 PROCEDURE — 80202 ASSAY OF VANCOMYCIN: CPT | Performed by: INTERNAL MEDICINE

## 2018-01-01 RX ORDER — LORAZEPAM 2 MG/ML
1 CONCENTRATE ORAL
Status: DISCONTINUED | OUTPATIENT
Start: 2018-01-01 | End: 2018-01-01 | Stop reason: HOSPADM

## 2018-01-01 RX ORDER — POTASSIUM CHLORIDE 29.8 MG/ML
20 INJECTION INTRAVENOUS
Status: COMPLETED | OUTPATIENT
Start: 2018-01-01 | End: 2018-01-01

## 2018-01-01 RX ORDER — POTASSIUM CHLORIDE 7.45 MG/ML
10 INJECTION INTRAVENOUS
Status: COMPLETED | OUTPATIENT
Start: 2018-01-01 | End: 2018-01-01

## 2018-01-01 RX ORDER — ATORVASTATIN CALCIUM 40 MG/1
40 TABLET, FILM COATED ORAL DAILY
Status: DISCONTINUED | OUTPATIENT
Start: 2018-01-01 | End: 2018-01-01 | Stop reason: HOSPADM

## 2018-01-01 RX ORDER — HEPARIN SODIUM 1000 [USP'U]/ML
40 INJECTION, SOLUTION INTRAVENOUS; SUBCUTANEOUS ONCE
Status: COMPLETED | OUTPATIENT
Start: 2018-01-01 | End: 2018-01-01

## 2018-01-01 RX ORDER — SODIUM CHLORIDE 9 MG/ML
1000 INJECTION, SOLUTION INTRAVENOUS
Status: COMPLETED | OUTPATIENT
Start: 2018-01-01 | End: 2018-01-01

## 2018-01-01 RX ORDER — MAGNESIUM SULFATE 1 G/100ML
1 INJECTION INTRAVENOUS ONCE
Status: COMPLETED | OUTPATIENT
Start: 2018-01-01 | End: 2018-01-01

## 2018-01-01 RX ORDER — SCOLOPAMINE TRANSDERMAL SYSTEM 1 MG/1
1 PATCH, EXTENDED RELEASE TRANSDERMAL
Status: DISCONTINUED | OUTPATIENT
Start: 2018-01-01 | End: 2018-01-01 | Stop reason: HOSPADM

## 2018-01-01 RX ORDER — POTASSIUM CHLORIDE 14.9 MG/ML
10 INJECTION INTRAVENOUS ONCE
Status: COMPLETED | OUTPATIENT
Start: 2018-01-01 | End: 2018-01-01

## 2018-01-01 RX ORDER — ALBUMIN HUMAN 50 G/1000ML
25 SOLUTION INTRAVENOUS ONCE
Status: DISCONTINUED | OUTPATIENT
Start: 2018-01-01 | End: 2018-01-01

## 2018-01-01 RX ORDER — SODIUM CHLORIDE 9 MG/ML
75 INJECTION, SOLUTION INTRAVENOUS CONTINUOUS
Status: DISCONTINUED | OUTPATIENT
Start: 2018-01-01 | End: 2018-01-01

## 2018-01-01 RX ORDER — SODIUM CHLORIDE 0.9 % (FLUSH) 0.9 %
5-10 SYRINGE (ML) INJECTION AS NEEDED
Status: DISCONTINUED | OUTPATIENT
Start: 2018-01-01 | End: 2018-01-01 | Stop reason: HOSPADM

## 2018-01-01 RX ORDER — POTASSIUM CHLORIDE 29.8 MG/ML
20 INJECTION INTRAVENOUS ONCE
Status: COMPLETED | OUTPATIENT
Start: 2018-01-01 | End: 2018-01-01

## 2018-01-01 RX ORDER — SODIUM CHLORIDE 9 MG/ML
150 INJECTION, SOLUTION INTRAVENOUS CONTINUOUS
Status: DISPENSED | OUTPATIENT
Start: 2018-01-01 | End: 2018-01-01

## 2018-01-01 RX ORDER — POTASSIUM CHLORIDE 7.45 MG/ML
INJECTION INTRAVENOUS
Status: COMPLETED
Start: 2018-01-01 | End: 2018-01-01

## 2018-01-01 RX ORDER — FUROSEMIDE 10 MG/ML
40 INJECTION INTRAMUSCULAR; INTRAVENOUS ONCE
Status: COMPLETED | OUTPATIENT
Start: 2018-01-01 | End: 2018-01-01

## 2018-01-01 RX ORDER — WARFARIN 2 MG/1
4 TABLET ORAL EVERY EVENING
Status: DISCONTINUED | OUTPATIENT
Start: 2018-01-01 | End: 2018-01-01

## 2018-01-01 RX ORDER — SODIUM CHLORIDE 0.9 % (FLUSH) 0.9 %
5-10 SYRINGE (ML) INJECTION EVERY 8 HOURS
Status: DISCONTINUED | OUTPATIENT
Start: 2018-01-01 | End: 2018-01-01 | Stop reason: HOSPADM

## 2018-01-01 RX ORDER — ADENOSINE 3 MG/ML
INJECTION, SOLUTION INTRAVENOUS
Status: COMPLETED
Start: 2018-01-01 | End: 2018-01-01

## 2018-01-01 RX ORDER — ONDANSETRON 2 MG/ML
4 INJECTION INTRAMUSCULAR; INTRAVENOUS
Status: DISCONTINUED | OUTPATIENT
Start: 2018-01-01 | End: 2018-01-01 | Stop reason: HOSPADM

## 2018-01-01 RX ORDER — HEPARIN SODIUM 10000 [USP'U]/100ML
18-36 INJECTION, SOLUTION INTRAVENOUS
Status: DISCONTINUED | OUTPATIENT
Start: 2018-01-01 | End: 2018-01-01

## 2018-01-01 RX ORDER — NOREPINEPHRINE BITARTRATE/D5W 8 MG/250ML
PLASTIC BAG, INJECTION (ML) INTRAVENOUS
Status: COMPLETED
Start: 2018-01-01 | End: 2018-01-01

## 2018-01-01 RX ORDER — ASPIRIN 325 MG
325 TABLET ORAL
Status: DISCONTINUED | OUTPATIENT
Start: 2018-01-01 | End: 2018-01-01

## 2018-01-01 RX ORDER — SODIUM CHLORIDE 9 MG/ML
1000 INJECTION, SOLUTION INTRAVENOUS ONCE
Status: COMPLETED | OUTPATIENT
Start: 2018-01-01 | End: 2018-01-01

## 2018-01-01 RX ORDER — LORAZEPAM 2 MG/ML
1 CONCENTRATE ORAL
Qty: 30 ML | Refills: 0 | Status: SHIPPED | OUTPATIENT
Start: 2018-01-01

## 2018-01-01 RX ORDER — HEPARIN SODIUM 1000 [USP'U]/ML
2320 INJECTION, SOLUTION INTRAVENOUS; SUBCUTANEOUS ONCE
Status: COMPLETED | OUTPATIENT
Start: 2018-01-01 | End: 2018-01-01

## 2018-01-01 RX ORDER — LEVOTHYROXINE SODIUM 75 UG/1
75 TABLET ORAL
Status: DISCONTINUED | OUTPATIENT
Start: 2018-01-01 | End: 2018-01-01 | Stop reason: HOSPADM

## 2018-01-01 RX ORDER — PANTOPRAZOLE SODIUM 40 MG/1
40 GRANULE, DELAYED RELEASE ORAL
Status: DISCONTINUED | OUTPATIENT
Start: 2018-01-01 | End: 2018-01-01 | Stop reason: HOSPADM

## 2018-01-01 RX ORDER — POTASSIUM CHLORIDE 1.5 G/1.77G
40 POWDER, FOR SOLUTION ORAL
Status: COMPLETED | OUTPATIENT
Start: 2018-01-01 | End: 2018-01-01

## 2018-01-01 RX ORDER — LEVOFLOXACIN 5 MG/ML
750 INJECTION, SOLUTION INTRAVENOUS EVERY 24 HOURS
Status: DISCONTINUED | OUTPATIENT
Start: 2018-01-01 | End: 2018-01-01

## 2018-01-01 RX ORDER — POTASSIUM CHLORIDE 1.5 G/1.77G
40 POWDER, FOR SOLUTION ORAL
Status: ACTIVE | OUTPATIENT
Start: 2018-01-01 | End: 2018-01-01

## 2018-01-01 RX ORDER — ONDANSETRON 2 MG/ML
INJECTION INTRAMUSCULAR; INTRAVENOUS
Status: COMPLETED
Start: 2018-01-01 | End: 2018-01-01

## 2018-01-01 RX ORDER — MORPHINE SULFATE 20 MG/ML
10 SOLUTION ORAL
Qty: 30 ML | Refills: 0 | Status: SHIPPED | OUTPATIENT
Start: 2018-01-01

## 2018-01-01 RX ORDER — POTASSIUM CHLORIDE 20MEQ/15ML
40 LIQUID (ML) ORAL
Status: DISCONTINUED | OUTPATIENT
Start: 2018-01-01 | End: 2018-01-01

## 2018-01-01 RX ORDER — POTASSIUM CHLORIDE 29.8 MG/ML
20 INJECTION INTRAVENOUS ONCE
Status: DISCONTINUED | OUTPATIENT
Start: 2018-01-01 | End: 2018-01-01

## 2018-01-01 RX ORDER — MORPHINE SULFATE 20 MG/ML
10 SOLUTION ORAL
Status: DISCONTINUED | OUTPATIENT
Start: 2018-01-01 | End: 2018-01-01 | Stop reason: HOSPADM

## 2018-01-01 RX ORDER — ADENOSINE 3 MG/ML
6 INJECTION, SOLUTION INTRAVENOUS ONCE
Status: COMPLETED | OUTPATIENT
Start: 2018-01-01 | End: 2018-01-01

## 2018-01-01 RX ORDER — POTASSIUM CHLORIDE 7.45 MG/ML
10 INJECTION INTRAVENOUS
Status: DISCONTINUED | OUTPATIENT
Start: 2018-01-01 | End: 2018-01-01

## 2018-01-01 RX ORDER — BUMETANIDE 0.5 MG/1
1.5 TABLET ORAL DAILY
Qty: 90 TAB | Refills: 0 | Status: SHIPPED | OUTPATIENT
Start: 2018-01-01

## 2018-01-01 RX ORDER — ACETAMINOPHEN 325 MG/1
650 TABLET ORAL
Status: DISCONTINUED | OUTPATIENT
Start: 2018-01-01 | End: 2018-01-01 | Stop reason: HOSPADM

## 2018-01-01 RX ORDER — ASPIRIN 300 MG/1
300 SUPPOSITORY RECTAL
Status: COMPLETED | OUTPATIENT
Start: 2018-01-01 | End: 2018-01-01

## 2018-01-01 RX ORDER — ASPIRIN 81 MG/1
81 TABLET ORAL DAILY
Status: DISCONTINUED | OUTPATIENT
Start: 2018-01-01 | End: 2018-01-01 | Stop reason: HOSPADM

## 2018-01-01 RX ORDER — POTASSIUM CHLORIDE 1500 MG/1
20 TABLET, FILM COATED, EXTENDED RELEASE ORAL DAILY
Qty: 30 TAB | Refills: 0 | Status: SHIPPED | OUTPATIENT
Start: 2018-01-01

## 2018-01-01 RX ORDER — LEVOFLOXACIN 500 MG/1
500 TABLET, FILM COATED ORAL EVERY 24 HOURS
Qty: 2 TAB | Refills: 0 | Status: SHIPPED | OUTPATIENT
Start: 2018-01-01 | End: 2018-01-01

## 2018-01-01 RX ORDER — NOREPINEPHRINE BITARTRATE/D5W 8 MG/250ML
2-16 PLASTIC BAG, INJECTION (ML) INTRAVENOUS
Status: DISCONTINUED | OUTPATIENT
Start: 2018-01-01 | End: 2018-01-01

## 2018-01-01 RX ORDER — LEVOTHYROXINE SODIUM 75 UG/1
75 TABLET ORAL
Status: CANCELLED | OUTPATIENT
Start: 2018-01-01

## 2018-01-01 RX ORDER — LEVOFLOXACIN 5 MG/ML
500 INJECTION, SOLUTION INTRAVENOUS
Status: DISCONTINUED | OUTPATIENT
Start: 2018-01-01 | End: 2018-01-01

## 2018-01-01 RX ORDER — ALBUMIN HUMAN 50 G/1000ML
12.5 SOLUTION INTRAVENOUS ONCE
Status: COMPLETED | OUTPATIENT
Start: 2018-01-01 | End: 2018-01-01

## 2018-01-01 RX ORDER — HEPARIN SODIUM 5000 [USP'U]/ML
5000 INJECTION, SOLUTION INTRAVENOUS; SUBCUTANEOUS EVERY 8 HOURS
Status: DISCONTINUED | OUTPATIENT
Start: 2018-01-01 | End: 2018-01-01

## 2018-01-01 RX ORDER — MIRTAZAPINE 15 MG/1
7.5 TABLET, FILM COATED ORAL
Status: CANCELLED | OUTPATIENT
Start: 2018-01-01

## 2018-01-01 RX ORDER — LEVOFLOXACIN 250 MG/1
500 TABLET ORAL EVERY 24 HOURS
Status: COMPLETED | OUTPATIENT
Start: 2018-01-01 | End: 2018-01-01

## 2018-01-01 RX ORDER — SCOLOPAMINE TRANSDERMAL SYSTEM 1 MG/1
1 PATCH, EXTENDED RELEASE TRANSDERMAL
Qty: 10 PATCH | Refills: 0 | Status: SHIPPED | OUTPATIENT
Start: 2018-01-01

## 2018-01-01 RX ORDER — HEPARIN SODIUM 1000 [USP'U]/ML
INJECTION, SOLUTION INTRAVENOUS; SUBCUTANEOUS
Status: COMPLETED
Start: 2018-01-01 | End: 2018-01-01

## 2018-01-01 RX ORDER — VANCOMYCIN/0.9 % SOD CHLORIDE 1 G/100 ML
1000 PLASTIC BAG, INJECTION (ML) INTRAVENOUS ONCE
Status: COMPLETED | OUTPATIENT
Start: 2018-01-01 | End: 2018-01-01

## 2018-01-01 RX ORDER — FUROSEMIDE 10 MG/ML
40 INJECTION INTRAMUSCULAR; INTRAVENOUS DAILY
Status: COMPLETED | OUTPATIENT
Start: 2018-01-01 | End: 2018-01-01

## 2018-01-01 RX ADMIN — POTASSIUM CHLORIDE 10 MEQ: 7.46 INJECTION, SOLUTION INTRAVENOUS at 13:00

## 2018-01-01 RX ADMIN — FUROSEMIDE 40 MG: 10 INJECTION, SOLUTION INTRAMUSCULAR; INTRAVENOUS at 11:48

## 2018-01-01 RX ADMIN — Medication 10 ML: at 16:26

## 2018-01-01 RX ADMIN — PANTOPRAZOLE SODIUM 40 MG: 40 GRANULE, DELAYED RELEASE ORAL at 09:29

## 2018-01-01 RX ADMIN — Medication 10 MCG/MIN: at 11:27

## 2018-01-01 RX ADMIN — Medication 10 ML: at 14:47

## 2018-01-01 RX ADMIN — POTASSIUM CHLORIDE 10 MEQ: 7.46 INJECTION, SOLUTION INTRAVENOUS at 22:22

## 2018-01-01 RX ADMIN — SODIUM CHLORIDE 750 MG: 900 INJECTION, SOLUTION INTRAVENOUS at 08:24

## 2018-01-01 RX ADMIN — POTASSIUM CHLORIDE 10 MEQ: 7.46 INJECTION, SOLUTION INTRAVENOUS at 02:31

## 2018-01-01 RX ADMIN — POTASSIUM CHLORIDE 10 MEQ: 7.46 INJECTION, SOLUTION INTRAVENOUS at 08:00

## 2018-01-01 RX ADMIN — PIPERACILLIN SODIUM,TAZOBACTAM SODIUM 4.5 G: 4; .5 INJECTION, POWDER, FOR SOLUTION INTRAVENOUS at 21:21

## 2018-01-01 RX ADMIN — LEVOFLOXACIN 500 MG: 250 TABLET, FILM COATED ORAL at 10:27

## 2018-01-01 RX ADMIN — HEPARIN SODIUM 8 UNITS/KG/HR: 10000 INJECTION, SOLUTION INTRAVENOUS at 15:26

## 2018-01-01 RX ADMIN — PANTOPRAZOLE SODIUM 40 MG: 40 GRANULE, DELAYED RELEASE ORAL at 09:43

## 2018-01-01 RX ADMIN — LEVOTHYROXINE SODIUM 75 MCG: 75 TABLET ORAL at 07:46

## 2018-01-01 RX ADMIN — LEVOFLOXACIN 750 MG: 5 INJECTION, SOLUTION INTRAVENOUS at 11:40

## 2018-01-01 RX ADMIN — APIXABAN 10 MG: 5 TABLET, FILM COATED ORAL at 10:28

## 2018-01-01 RX ADMIN — ASPIRIN 81 MG: 81 TABLET, COATED ORAL at 08:33

## 2018-01-01 RX ADMIN — POTASSIUM CHLORIDE 10 MEQ: 7.46 INJECTION, SOLUTION INTRAVENOUS at 07:44

## 2018-01-01 RX ADMIN — ALBUMIN (HUMAN) 12.5 G: 12.5 INJECTION, SOLUTION INTRAVENOUS at 15:58

## 2018-01-01 RX ADMIN — APIXABAN 10 MG: 5 TABLET, FILM COATED ORAL at 09:43

## 2018-01-01 RX ADMIN — SODIUM CHLORIDE 50 ML/HR: 900 INJECTION, SOLUTION INTRAVENOUS at 15:32

## 2018-01-01 RX ADMIN — Medication 10 ML: at 00:03

## 2018-01-01 RX ADMIN — Medication 10 ML: at 06:40

## 2018-01-01 RX ADMIN — POTASSIUM CHLORIDE 10 MEQ: 7.46 INJECTION, SOLUTION INTRAVENOUS at 08:52

## 2018-01-01 RX ADMIN — LEVOTHYROXINE SODIUM ANHYDROUS 37.5 MCG: 100 INJECTION, POWDER, LYOPHILIZED, FOR SOLUTION INTRAVENOUS at 08:58

## 2018-01-01 RX ADMIN — POTASSIUM CHLORIDE 10 MEQ: 7.46 INJECTION, SOLUTION INTRAVENOUS at 22:48

## 2018-01-01 RX ADMIN — SODIUM CHLORIDE 1000 MG: 900 INJECTION, SOLUTION INTRAVENOUS at 16:06

## 2018-01-01 RX ADMIN — Medication 10 ML: at 05:35

## 2018-01-01 RX ADMIN — Medication 10 ML: at 22:48

## 2018-01-01 RX ADMIN — POTASSIUM CHLORIDE 20 MEQ: 400 INJECTION, SOLUTION INTRAVENOUS at 17:04

## 2018-01-01 RX ADMIN — ASPIRIN 81 MG: 81 TABLET, COATED ORAL at 08:12

## 2018-01-01 RX ADMIN — PIPERACILLIN SODIUM,TAZOBACTAM SODIUM 4.5 G: 4; .5 INJECTION, POWDER, FOR SOLUTION INTRAVENOUS at 12:00

## 2018-01-01 RX ADMIN — PIPERACILLIN SODIUM,TAZOBACTAM SODIUM 4.5 G: 4; .5 INJECTION, POWDER, FOR SOLUTION INTRAVENOUS at 20:30

## 2018-01-01 RX ADMIN — SODIUM CHLORIDE 150 ML/HR: 900 INJECTION, SOLUTION INTRAVENOUS at 18:30

## 2018-01-01 RX ADMIN — PANTOPRAZOLE SODIUM 40 MG: 40 INJECTION, POWDER, FOR SOLUTION INTRAVENOUS at 08:52

## 2018-01-01 RX ADMIN — VANCOMYCIN HYDROCHLORIDE 1000 MG: 10 INJECTION, POWDER, LYOPHILIZED, FOR SOLUTION INTRAVENOUS at 12:49

## 2018-01-01 RX ADMIN — SODIUM CHLORIDE 1239 ML: 900 INJECTION, SOLUTION INTRAVENOUS at 11:17

## 2018-01-01 RX ADMIN — ATORVASTATIN CALCIUM 40 MG: 40 TABLET, FILM COATED ORAL at 08:33

## 2018-01-01 RX ADMIN — SODIUM CHLORIDE 1000 MG: 900 INJECTION, SOLUTION INTRAVENOUS at 05:10

## 2018-01-01 RX ADMIN — ATORVASTATIN CALCIUM 40 MG: 40 TABLET, FILM COATED ORAL at 08:12

## 2018-01-01 RX ADMIN — HEPARIN SODIUM 5000 UNITS: 5000 INJECTION, SOLUTION INTRAVENOUS; SUBCUTANEOUS at 08:30

## 2018-01-01 RX ADMIN — ATORVASTATIN CALCIUM 40 MG: 40 TABLET, FILM COATED ORAL at 09:43

## 2018-01-01 RX ADMIN — Medication 8 MCG/MIN: at 12:47

## 2018-01-01 RX ADMIN — HEPARIN SODIUM 2320 UNITS: 1000 INJECTION, SOLUTION INTRAVENOUS; SUBCUTANEOUS at 13:42

## 2018-01-01 RX ADMIN — Medication 10 ML: at 22:30

## 2018-01-01 RX ADMIN — SODIUM CHLORIDE 750 MG: 900 INJECTION, SOLUTION INTRAVENOUS at 22:35

## 2018-01-01 RX ADMIN — APIXABAN 10 MG: 5 TABLET, FILM COATED ORAL at 17:39

## 2018-01-01 RX ADMIN — ADENOSINE 6 MG: 3 INJECTION, SOLUTION INTRAVENOUS at 18:02

## 2018-01-01 RX ADMIN — MAGNESIUM SULFATE HEPTAHYDRATE 1 G: 1 INJECTION, SOLUTION INTRAVENOUS at 14:02

## 2018-01-01 RX ADMIN — LEVOTHYROXINE SODIUM ANHYDROUS 37.5 MCG: 100 INJECTION, POWDER, LYOPHILIZED, FOR SOLUTION INTRAVENOUS at 08:36

## 2018-01-01 RX ADMIN — ATORVASTATIN CALCIUM 40 MG: 40 TABLET, FILM COATED ORAL at 08:29

## 2018-01-01 RX ADMIN — PIPERACILLIN SODIUM,TAZOBACTAM SODIUM 4.5 G: 4; .5 INJECTION, POWDER, FOR SOLUTION INTRAVENOUS at 20:37

## 2018-01-01 RX ADMIN — ONDANSETRON 4 MG: 2 INJECTION INTRAMUSCULAR; INTRAVENOUS at 10:22

## 2018-01-01 RX ADMIN — POTASSIUM CHLORIDE 10 MEQ: 7.46 INJECTION, SOLUTION INTRAVENOUS at 01:31

## 2018-01-01 RX ADMIN — FUROSEMIDE 40 MG: 10 INJECTION, SOLUTION INTRAMUSCULAR; INTRAVENOUS at 14:01

## 2018-01-01 RX ADMIN — PIPERACILLIN SODIUM,TAZOBACTAM SODIUM 4.5 G: 4; .5 INJECTION, POWDER, FOR SOLUTION INTRAVENOUS at 11:42

## 2018-01-01 RX ADMIN — Medication 10 ML: at 22:00

## 2018-01-01 RX ADMIN — PIPERACILLIN SODIUM,TAZOBACTAM SODIUM 4.5 G: 4; .5 INJECTION, POWDER, FOR SOLUTION INTRAVENOUS at 00:00

## 2018-01-01 RX ADMIN — HEPARIN SODIUM 2190 UNITS: 1000 INJECTION, SOLUTION INTRAVENOUS; SUBCUTANEOUS at 05:09

## 2018-01-01 RX ADMIN — SODIUM CHLORIDE 750 MG: 900 INJECTION, SOLUTION INTRAVENOUS at 12:24

## 2018-01-01 RX ADMIN — PIPERACILLIN SODIUM,TAZOBACTAM SODIUM 4.5 G: 4; .5 INJECTION, POWDER, FOR SOLUTION INTRAVENOUS at 11:23

## 2018-01-01 RX ADMIN — Medication 12 MCG/MIN: at 05:18

## 2018-01-01 RX ADMIN — PANTOPRAZOLE SODIUM 40 MG: 40 INJECTION, POWDER, FOR SOLUTION INTRAVENOUS at 08:33

## 2018-01-01 RX ADMIN — MAGNESIUM SULFATE HEPTAHYDRATE 1 G: 1 INJECTION, SOLUTION INTRAVENOUS at 07:45

## 2018-01-01 RX ADMIN — SODIUM CHLORIDE 150 ML/HR: 900 INJECTION, SOLUTION INTRAVENOUS at 11:36

## 2018-01-01 RX ADMIN — ATORVASTATIN CALCIUM 40 MG: 40 TABLET, FILM COATED ORAL at 09:29

## 2018-01-01 RX ADMIN — Medication 10 ML: at 23:44

## 2018-01-01 RX ADMIN — SODIUM CHLORIDE 1000 ML: 900 INJECTION, SOLUTION INTRAVENOUS at 18:05

## 2018-01-01 RX ADMIN — SODIUM CHLORIDE 1000 ML: 900 INJECTION, SOLUTION INTRAVENOUS at 17:04

## 2018-01-01 RX ADMIN — ATORVASTATIN CALCIUM 40 MG: 40 TABLET, FILM COATED ORAL at 08:52

## 2018-01-01 RX ADMIN — Medication 12 MCG/MIN: at 01:25

## 2018-01-01 RX ADMIN — HEPARIN SODIUM 6 UNITS/KG/HR: 10000 INJECTION, SOLUTION INTRAVENOUS at 20:35

## 2018-01-01 RX ADMIN — Medication 10 ML: at 22:52

## 2018-01-01 RX ADMIN — PIPERACILLIN SODIUM,TAZOBACTAM SODIUM 4.5 G: 4; .5 INJECTION, POWDER, FOR SOLUTION INTRAVENOUS at 12:09

## 2018-01-01 RX ADMIN — APIXABAN 10 MG: 5 TABLET, FILM COATED ORAL at 18:20

## 2018-01-01 RX ADMIN — PIPERACILLIN SODIUM,TAZOBACTAM SODIUM 4.5 G: 4; .5 INJECTION, POWDER, FOR SOLUTION INTRAVENOUS at 03:30

## 2018-01-01 RX ADMIN — ASPIRIN 81 MG: 81 TABLET, COATED ORAL at 09:29

## 2018-01-01 RX ADMIN — SODIUM CHLORIDE 1000 MG: 900 INJECTION, SOLUTION INTRAVENOUS at 13:00

## 2018-01-01 RX ADMIN — LEVOFLOXACIN 500 MG: 5 INJECTION, SOLUTION INTRAVENOUS at 10:06

## 2018-01-01 RX ADMIN — PANTOPRAZOLE SODIUM 40 MG: 40 INJECTION, POWDER, FOR SOLUTION INTRAVENOUS at 09:05

## 2018-01-01 RX ADMIN — APIXABAN 10 MG: 5 TABLET, FILM COATED ORAL at 09:29

## 2018-01-01 RX ADMIN — Medication 10 ML: at 15:25

## 2018-01-01 RX ADMIN — ACETAMINOPHEN 650 MG: 325 TABLET, FILM COATED ORAL at 22:52

## 2018-01-01 RX ADMIN — Medication 10 ML: at 13:36

## 2018-01-01 RX ADMIN — LEVOFLOXACIN 500 MG: 250 TABLET, FILM COATED ORAL at 12:55

## 2018-01-01 RX ADMIN — POTASSIUM CHLORIDE 20 MEQ: 400 INJECTION, SOLUTION INTRAVENOUS at 18:02

## 2018-01-01 RX ADMIN — Medication 14 MCG/MIN: at 05:20

## 2018-01-01 RX ADMIN — Medication 14 MCG/MIN: at 21:10

## 2018-01-01 RX ADMIN — Medication 16 MCG/MIN: at 13:48

## 2018-01-01 RX ADMIN — ONDANSETRON 4 MG: 2 INJECTION INTRAMUSCULAR; INTRAVENOUS at 09:00

## 2018-01-01 RX ADMIN — POTASSIUM CHLORIDE 10 MEQ: 7.46 INJECTION, SOLUTION INTRAVENOUS at 08:36

## 2018-01-01 RX ADMIN — Medication 10 ML: at 05:11

## 2018-01-01 RX ADMIN — Medication 10 ML: at 16:09

## 2018-01-01 RX ADMIN — SODIUM CHLORIDE 750 MG: 900 INJECTION, SOLUTION INTRAVENOUS at 12:48

## 2018-01-01 RX ADMIN — Medication 8 MCG/MIN: at 16:10

## 2018-01-01 RX ADMIN — Medication 10 ML: at 05:18

## 2018-01-01 RX ADMIN — LEVOFLOXACIN 500 MG: 5 INJECTION, SOLUTION INTRAVENOUS at 10:22

## 2018-01-01 RX ADMIN — PIPERACILLIN SODIUM,TAZOBACTAM SODIUM 4.5 G: 4; .5 INJECTION, POWDER, FOR SOLUTION INTRAVENOUS at 20:08

## 2018-01-01 RX ADMIN — FUROSEMIDE 40 MG: 10 INJECTION, SOLUTION INTRAMUSCULAR; INTRAVENOUS at 15:01

## 2018-01-01 RX ADMIN — PANTOPRAZOLE SODIUM 40 MG: 40 INJECTION, POWDER, FOR SOLUTION INTRAVENOUS at 08:29

## 2018-01-01 RX ADMIN — PIPERACILLIN SODIUM,TAZOBACTAM SODIUM 4.5 G: 4; .5 INJECTION, POWDER, FOR SOLUTION INTRAVENOUS at 05:10

## 2018-01-01 RX ADMIN — ATORVASTATIN CALCIUM 40 MG: 40 TABLET, FILM COATED ORAL at 09:05

## 2018-01-01 RX ADMIN — Medication 10 ML: at 21:27

## 2018-01-01 RX ADMIN — Medication 10 ML: at 05:13

## 2018-01-01 RX ADMIN — ASPIRIN 81 MG: 81 TABLET, COATED ORAL at 08:52

## 2018-01-01 RX ADMIN — ASPIRIN 300 MG: 300 SUPPOSITORY RECTAL at 13:25

## 2018-01-01 RX ADMIN — FUROSEMIDE 40 MG: 10 INJECTION, SOLUTION INTRAMUSCULAR; INTRAVENOUS at 09:29

## 2018-01-01 RX ADMIN — Medication 10 ML: at 05:06

## 2018-01-01 RX ADMIN — POTASSIUM CHLORIDE 10 MEQ: 7.46 INJECTION, SOLUTION INTRAVENOUS at 10:28

## 2018-01-01 RX ADMIN — Medication 16 MCG/MIN: at 22:04

## 2018-01-01 RX ADMIN — POTASSIUM CHLORIDE 20 MEQ: 400 INJECTION, SOLUTION INTRAVENOUS at 08:33

## 2018-01-01 RX ADMIN — Medication 10 ML: at 07:43

## 2018-01-01 RX ADMIN — POTASSIUM CHLORIDE 10 MEQ: 200 INJECTION, SOLUTION INTRAVENOUS at 09:04

## 2018-01-01 RX ADMIN — ACETAMINOPHEN 650 MG: 325 TABLET, FILM COATED ORAL at 21:27

## 2018-01-01 RX ADMIN — PIPERACILLIN SODIUM,TAZOBACTAM SODIUM 4.5 G: 4; .5 INJECTION, POWDER, FOR SOLUTION INTRAVENOUS at 04:18

## 2018-01-01 RX ADMIN — POTASSIUM CHLORIDE 10 MEQ: 7.46 INJECTION, SOLUTION INTRAVENOUS at 11:30

## 2018-01-01 RX ADMIN — PIPERACILLIN SODIUM,TAZOBACTAM SODIUM 4.5 G: 4; .5 INJECTION, POWDER, FOR SOLUTION INTRAVENOUS at 20:21

## 2018-01-01 RX ADMIN — HEPARIN SODIUM 18 UNITS/KG/HR: 10000 INJECTION, SOLUTION INTRAVENOUS at 16:17

## 2018-01-01 RX ADMIN — POTASSIUM CHLORIDE 10 MEQ: 7.46 INJECTION, SOLUTION INTRAVENOUS at 20:42

## 2018-01-01 RX ADMIN — PIPERACILLIN SODIUM,TAZOBACTAM SODIUM 4.5 G: 4; .5 INJECTION, POWDER, FOR SOLUTION INTRAVENOUS at 11:27

## 2018-01-01 RX ADMIN — HEPARIN SODIUM 5000 UNITS: 5000 INJECTION, SOLUTION INTRAVENOUS; SUBCUTANEOUS at 16:21

## 2018-01-01 RX ADMIN — ASPIRIN 81 MG: 81 TABLET, COATED ORAL at 08:29

## 2018-01-01 RX ADMIN — ASPIRIN 81 MG: 81 TABLET, COATED ORAL at 09:05

## 2018-01-01 RX ADMIN — PIPERACILLIN SODIUM,TAZOBACTAM SODIUM 4.5 G: 4; .5 INJECTION, POWDER, FOR SOLUTION INTRAVENOUS at 19:05

## 2018-01-01 RX ADMIN — LEVOTHYROXINE SODIUM 75 MCG: 75 TABLET ORAL at 06:19

## 2018-01-01 RX ADMIN — WARFARIN SODIUM 4 MG: 2 TABLET ORAL at 18:14

## 2018-01-01 RX ADMIN — Medication 10 ML: at 22:43

## 2018-01-01 RX ADMIN — POTASSIUM CHLORIDE 10 MEQ: 7.46 INJECTION, SOLUTION INTRAVENOUS at 21:42

## 2018-01-01 RX ADMIN — PIPERACILLIN SODIUM,TAZOBACTAM SODIUM 4.5 G: 4; .5 INJECTION, POWDER, FOR SOLUTION INTRAVENOUS at 11:18

## 2018-01-01 RX ADMIN — Medication 10 ML: at 13:14

## 2018-01-01 RX ADMIN — Medication 10 ML: at 14:03

## 2018-01-01 RX ADMIN — SODIUM CHLORIDE 750 MG: 900 INJECTION, SOLUTION INTRAVENOUS at 09:50

## 2018-01-01 RX ADMIN — ASPIRIN 81 MG: 81 TABLET, COATED ORAL at 08:25

## 2018-01-01 RX ADMIN — PIPERACILLIN SODIUM,TAZOBACTAM SODIUM 4.5 G: 4; .5 INJECTION, POWDER, FOR SOLUTION INTRAVENOUS at 05:16

## 2018-01-01 RX ADMIN — PANTOPRAZOLE SODIUM 40 MG: 40 INJECTION, POWDER, FOR SOLUTION INTRAVENOUS at 08:25

## 2018-01-01 RX ADMIN — Medication 10 ML: at 13:20

## 2018-01-01 RX ADMIN — POTASSIUM CHLORIDE 20 MEQ: 400 INJECTION, SOLUTION INTRAVENOUS at 20:23

## 2018-01-01 RX ADMIN — LEVOTHYROXINE SODIUM 75 MCG: 75 TABLET ORAL at 06:40

## 2018-01-01 RX ADMIN — POTASSIUM CHLORIDE 10 MEQ: 7.46 INJECTION, SOLUTION INTRAVENOUS at 09:06

## 2018-01-01 RX ADMIN — Medication 16 MCG/MIN: at 05:21

## 2018-01-01 RX ADMIN — Medication 10 ML: at 08:34

## 2018-01-01 RX ADMIN — HEPARIN SODIUM 15 UNITS/KG/HR: 10000 INJECTION, SOLUTION INTRAVENOUS at 11:18

## 2018-01-01 RX ADMIN — PANTOPRAZOLE SODIUM 40 MG: 40 INJECTION, POWDER, FOR SOLUTION INTRAVENOUS at 08:12

## 2018-01-01 RX ADMIN — PIPERACILLIN SODIUM,TAZOBACTAM SODIUM 4.5 G: 4; .5 INJECTION, POWDER, FOR SOLUTION INTRAVENOUS at 03:58

## 2018-01-01 RX ADMIN — HEPARIN SODIUM 11 UNITS/KG/HR: 10000 INJECTION, SOLUTION INTRAVENOUS at 16:48

## 2018-01-01 RX ADMIN — POTASSIUM CHLORIDE 10 MEQ: 7.46 INJECTION, SOLUTION INTRAVENOUS at 08:24

## 2018-01-01 RX ADMIN — ATORVASTATIN CALCIUM 40 MG: 40 TABLET, FILM COATED ORAL at 08:25

## 2018-01-01 RX ADMIN — ASPIRIN 81 MG: 81 TABLET, COATED ORAL at 09:43

## 2018-01-01 RX ADMIN — Medication 5 MCG/MIN: at 23:30

## 2018-01-01 RX ADMIN — HEPARIN SODIUM 5000 UNITS: 5000 INJECTION, SOLUTION INTRAVENOUS; SUBCUTANEOUS at 22:06

## 2018-01-01 RX ADMIN — CALCIUM GLUCONATE 4 G: 94 INJECTION, SOLUTION INTRAVENOUS at 22:06

## 2018-01-01 RX ADMIN — PIPERACILLIN SODIUM,TAZOBACTAM SODIUM 4.5 G: 4; .5 INJECTION, POWDER, FOR SOLUTION INTRAVENOUS at 05:13

## 2018-01-01 RX ADMIN — POTASSIUM CHLORIDE 40 MEQ: 1.5 POWDER, FOR SOLUTION ORAL at 10:36

## 2018-02-22 PROBLEM — N17.9 AKI (ACUTE KIDNEY INJURY) (HCC): Status: ACTIVE | Noted: 2018-01-01

## 2018-02-22 PROBLEM — I47.1 SUPRAVENTRICULAR TACHYCARDIA (HCC): Status: ACTIVE | Noted: 2018-01-01

## 2018-02-22 PROBLEM — F03.918 DEMENTIA WITH BEHAVIORAL DISTURBANCE: Chronic | Status: ACTIVE | Noted: 2018-01-01

## 2018-02-22 PROBLEM — I95.9 SEPSIS ASSOCIATED HYPOTENSION (HCC): Status: ACTIVE | Noted: 2018-01-01

## 2018-02-22 PROBLEM — N30.00 ACUTE CYSTITIS WITHOUT HEMATURIA: Status: ACTIVE | Noted: 2018-01-01

## 2018-02-22 PROBLEM — F03.918 DEMENTIA WITH BEHAVIORAL DISTURBANCE: Status: ACTIVE | Noted: 2018-01-01

## 2018-02-22 PROBLEM — E03.9 ACQUIRED HYPOTHYROIDISM: Status: ACTIVE | Noted: 2018-01-01

## 2018-02-22 PROBLEM — R65.21 SEPTIC SHOCK (HCC): Status: ACTIVE | Noted: 2018-01-01

## 2018-02-22 PROBLEM — R57.9 SHOCK (HCC): Status: ACTIVE | Noted: 2018-01-01

## 2018-02-22 PROBLEM — R79.89 ELEVATED BRAIN NATRIURETIC PEPTIDE (BNP) LEVEL: Status: ACTIVE | Noted: 2018-01-01

## 2018-02-22 PROBLEM — R77.8 ELEVATED TROPONIN: Status: ACTIVE | Noted: 2018-01-01

## 2018-02-22 PROBLEM — A41.9 SEPSIS ASSOCIATED HYPOTENSION (HCC): Status: ACTIVE | Noted: 2018-01-01

## 2018-02-22 NOTE — ED NOTES
The Sepsis Screening has been completed on arrival in the Emergency Department. Vital signs:  Patient Vitals for the past 4 hrs:   Pulse Resp BP SpO2   02/22/18 1038 (!) 104 23 94/49 96 %            =monitored (data validate)  MAP (Calculated): (!) 64    =calculated (manual entry)    Is patient is 29y/o or older, meets 2 or more ABNORMAL VITAL SIGNS below, associated with SUSPECTED INFECTION?   yes:[id number]}     Temperature < 96.8°F (36°C) or > 100.9°F (38.3°C)   Heart Rate > 90 beats per minute   Respiratory Rate > 20 beats per minute   BP < 90 systolic    MAP < 65    IF ANSWER IS YES, CALL A CODE SEPSIS  POC LACTIC ACID ASAP AND BEGIN NURSE DRIVEN SEPSIS ORDERS WHILE AWAITING PROVIDER

## 2018-02-22 NOTE — IP AVS SNAPSHOT
[address] [address] 
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 
[phone] Discharge Orders None A check marlen indicates which time of day the medication should be taken. My Medications START taking these medications Instructions Each Dose to Equal  
 Morning Noon Evening Bedtime  
 bumetanide 0.5 mg tablet Commonly known as:  Michail Juan Pablo Your last dose was: Your next dose is: Take 3 Tabs by mouth daily. 1.5 mg LORazepam 2 mg/mL concentrated solution Commonly known as:  INTENSOL Your last dose was: Your next dose is: Take 0.5 mL by mouth every one (1) hour as needed. Max Daily Amount: 24 mg.  
 1 mg  
    
   
   
   
  
 morphine 100 mg/5 mL (20 mg/mL) concentrated solution Commonly known as:  Morna Mages Your last dose was: Your next dose is: Take 0.5 mL by mouth every one (1) hour as needed. Max Daily Amount: 240 mg.  
 10 mg  
    
   
   
   
  
 naloxone 2 mg/actuation Spry Your last dose was: Your next dose is: Use 1 spray intranasally into 1 nostril. Use a new Narcan nasal spray for subsequent doses and administer into alternating nostrils. May repeat every 2 to 3 minutes as needed. potassium chloride SR 20 mEq tablet Commonly known as:  K-TAB Your last dose was: Your next dose is: Take 1 Tab by mouth daily. 20 mEq  
    
   
   
   
  
 scopolamine 1 mg over 3 days Pt3d Commonly known as:  TRANSDERM-SCOP Your last dose was: Your next dose is:    
   
   
 1 Patch by TransDERmal route every seventy-two (72) hours as needed for Other (SECRETIONS). 1 Patch CONTINUE taking these medications Instructions Each Dose to Equal  
 Morning Noon Evening Bedtime  
 levothyroxine 75 mcg tablet Commonly known as:  SYNTHROID Your last dose was: Your next dose is: Take 75 mcg by mouth Daily (before breakfast). 75 mcg  
    
   
   
   
  
 polyethylene glycol 17 gram packet Commonly known as:  Suzen Rude Your last dose was: Your next dose is: Take 17 g by mouth daily. 17 g STOP taking these medications   
 aspirin delayed-release 81 mg tablet  
   
  
 atorvastatin 40 mg tablet Commonly known as:  LIPITOR  
   
  
 calcium-vitamin D 500 mg(1,250mg) -200 unit per tablet Commonly known as:  OYSTER SHELL  
   
  
 MAGNESIUM OXIDE (BULK) memantine 10 mg tablet Commonly known as:  NAMENDA  
   
  
 mirtazapine 7.5 mg tablet Commonly known as:  REMERON  
   
  
 MULTI VITAMIN PO  
   
  
 NAMENDA 10 mg tablet Generic drug:  memantine Where to Get Your Medications Information on where to get these meds will be given to you by the nurse or doctor. ! Ask your nurse or doctor about these medications  
  bumetanide 0.5 mg tablet LORazepam 2 mg/mL concentrated solution morphine 100 mg/5 mL (20 mg/mL) concentrated solution  
 naloxone 2 mg/actuation Spry  
 potassium chloride SR 20 mEq tablet  
 scopolamine 1 mg over 3 days Pt3d Discharge Instructions DISCHARGE SUMMARY from Nurse PATIENT INSTRUCTIONS: 
 
 
F-face looks uneven A-arms unable to move or move unevenly S-speech slurred or non-existent T-time-call 911 as soon as signs and symptoms begin-DO NOT go Back to bed or wait to see if you get better-TIME IS BRAIN. Warning Signs of HEART ATTACK Call 911 if you have these symptoms: 
? Chest discomfort. Most heart attacks involve discomfort in the center of the chest that lasts more than a few minutes, or that goes away and comes back. It can feel like uncomfortable pressure, squeezing, fullness, or pain. ? Discomfort in other areas of the upper body. Symptoms can include pain or discomfort in one or both arms, the back, neck, jaw, or stomach. ? Shortness of breath with or without chest discomfort. ? Other signs may include breaking out in a cold sweat, nausea, or lightheadedness. Don't wait more than five minutes to call [address]! Fast action can save your life. Calling 911 is almost always the fastest way to get lifesaving treatment. Emergency Medical Services staff can begin treatment when they arrive  up to an hour sooner than if someone gets to the hospital by car. The discharge information has been reviewed with the patient. The patient verbalized understanding. Discharge medications reviewed with the patient and appropriate educational materials and side effects teaching were provided. ___________________________________________________________________________________________________________________________________ Patient discharged without removing armband and transfered to another healthcare acute, sub acute , or extended care facility. Informed of privacy risks if armband lost or stolen. MyChart Activation Thank you for enrolling in [address] Please follow the instructions below to securely access your online medical record. Connect2me allows you to send messages to your doctor, view your test results, renew your prescriptions, schedule appointments, and more. How Do I Sign Up? 1. In your internet browser, go to [email or web address] 2. Click on the First Time User? Click Here link in the Sign In box. You will see the New Member Sign Up page. 3. Enter your Connect2me Access Code exactly as it appears below. You will not need to use this code after youve completed the sign-up process. If you do not sign up before the expiration date, you must request a new code. Connect2me Access Code: [email or web address] Expires: 5/14/2018 12:06 PM  
 
4. Enter the last four digits of your Social Security Number (xxxx) and Date of Birth (mm/dd/yyyy) as indicated and click Submit. You will be taken to the next sign-up page. 5. Create a Cliqt ID. This will be your Connect2me login ID and cannot be changed, so think of one that is secure and easy to remember. 6. Create a Connect2me password. You can change your password at any time. 7. Enter your Password Reset Question and Answer. This can be used at a later time if you forget your password. 8. Enter your e-mail address. You will receive e-mail notification when new information is available in [address] 9. Click Sign Up. You can now view your medical record. Additional Information Remember, Connect2me is NOT to be used for urgent needs. For medical emergencies, dial 911. Now available from your iPhone and Android! Advance Directives: Care Instructions Your Care Instructions An advance directive is a legal way to state your wishes at the end of your life. It tells your family and your doctor what to do if you can no longer say what you want. There are two main types of advance directives. You can change them any time that your wishes change. · A living will tells your family and your doctor your wishes about life support and other treatment. · A durable power of  for health care lets you name a person to make treatment decisions for you when you can't speak for yourself. This person is called a health care agent. If you do not have an advance directive, decisions about your medical care may be made by a doctor or a  who doesn't know you. It may help to think of an advance directive as a gift to the people who care for you. If you have one, they won't have to make tough decisions by themselves. Follow-up care is a key part of your treatment and safety. Be sure to make and go to all appointments, and call your doctor if you are having problems. It's also a good idea to know your test results and keep a list of the medicines you take. How can you care for yourself at home? · Discuss your wishes with your loved ones and your doctor. This way, there are no surprises. · Many states have a unique form. Or you might use a universal form that has been approved by many states. This kind of form can sometimes be completed and stored online. Your electronic copy will then be available wherever you have a connection to the Internet. In most cases, doctors will respect your wishes even if you have a form from a different state. · You don't need a  to do an advance directive. But you may want to get legal advice. · Think about these questions when you prepare an advance directive: ¨ Who do you want to make decisions about your medical care if you are not able to? Many people choose a family member or close friend. ¨ Do you know enough about life support methods that might be used? If not, talk to your doctor so you understand. ¨ What are you most afraid of that might happen? You might be afraid of having pain, losing your independence, or being kept alive by machines. ¨ Where would you prefer to die? Choices include your home, a hospital, or a nursing home. ¨ Would you like to have information about hospice care to support you and your family? ¨ Do you want to donate organs when you die? ¨ Do you want certain Taoism practices performed before you die? If so, put your wishes in the advance directive. · Read your advance directive every year, and make changes as needed. When should you call for help? Be sure to contact your doctor if you have any questions. Where can you learn more? Go to http://nakul-quinten.info/. Enter R264 in the search box to learn more about \"Advance Directives: Care Instructions. \" Current as of: September 24, 2016 Content Version: 11.4 © 1302-0012 [employer]. Care instructions adapted under license by [employer] (which disclaims liability or warranty for this information). If you have questions about a medical condition or this instruction, always ask your healthcare professional. [address] any warranty or liability for your use of this information. Helping a Person With Alzheimer's Disease: Care Instructions Your Care Instructions Alzheimer's disease is a type of dementia. It affects memory, intelligence, judgment, language, and behavior. It is not clear what causes this disease. But it is the most common form of dementia in older adults. It may take many years to develop. Alzheimer's disease is different than mild memory loss that occurs with aging.  
Family members usually notice symptoms first. But the person also may realize that something is wrong. Follow-up care is a key part of your loved one's treatment and safety. Be sure to make and go to all appointments, and call your doctor if your loved one is having problems. It's also a good idea to know your loved one's test results and keep a list of the medicines he or she takes. How can you care for your loved one at home? · Develop a routine. The person will feel less frustrated or confused with a clear, simple daily plan. Remind him or her about important facts and events. · Be patient. It may take longer for the person to complete a task than it used to. · Help the person eat a balanced diet. Serve plenty of whole grains, fruits, and vegetables every day. If the person is not eating well at mealtimes, give snacks at midmorning and in the afternoon. Offer drinks such as Boost, Ensure, or Sustacal if he or she is losing weight. · Encourage exercise. Walking and other activity may slow the decline of mental ability. Help the person keep an active mind. Encourage hobbies such as reading and crossword puzzles. · Take steps to help if the person is sundowning. This is the restless behavior and trouble with sleeping that may occur in late afternoon and at night. Try not to let the person nap during the day. Offer a glass of warm milk or caffeine-free tea before bedtime. · Ask family members and friends for help. You may need breaks where others can help care for the person. · Talk to the person's doctor about what resources are available for help in your area. · Review all of the person's medicines with his or her doctor. · For as long as the person is able, allow him or her to make decisions about activities, food, clothing, and other choices. Let the person be independent, even if tasks take more time or are not done perfectly. Tailor tasks to the person's abilities. For example, if cooking is no longer safe, ask for other help.  He or she can help set the table or make simple dishes such as a salad. When the person needs help, offer it gently. Keeping safe · Make your home (or the person's home) safe. Tack down rugs, and put no-slip tape in the tub. Install handrails, and put safety switches on stoves and appliances. Keep rooms free of clutter. Make sure walkways around furniture are clear. Do not move furniture around, because the person may become confused. · Use locks on doors and cupboards. Lock up knives, scissors, medicines, cleaning supplies, and other dangerous things. · Do not let the person drive or cook if he or she cannot do it safely. A person with Alzheimer's should not drive unless he or she is able to pass an on-road driving test. Your state 's license bureau can do a driving test if there is any question. · Get medical alert jewelry for the person so you can be contacted if he or she wanders away. If possible, provide a safe place for wandering, such as an enclosed yard or garden. When should you call for help? Call 911 anytime you think you may need emergency care. For example, call if: 
? · A person who has Alzheimer's disease has disappeared. ? · A person who has Alzheimer's disease is seriously injured. ?Call your doctor now or seek immediate medical care if: 
? · The person you are caring for suddenly sees or hears things that are not there (hallucinates). ? · The person you are caring for has a sudden, drastic change in his or her behavior. ? Watch closely for changes in your loved one's health, and be sure to contact the doctor if: 
? · A person who has Alzheimer's disease gradually gets worse or has symptoms that could cause injury. ? · You need help caring for a person with Alzheimer's disease. ? · The person has problems with his or her medicine. Where can you learn more? Go to http://nakul-quinten.info/.  
Enter O366 in the search box to learn more about \"Helping a Person With Alzheimer's Disease: Care Instructions. \" Current as of: May 12, 2017 Content Version: 11.4 © 5770-9271 [employer]. Care instructions adapted under license by [employer] (which disclaims liability or warranty for this information). If you have questions about a medical condition or this instruction, always ask your healthcare professional. [facility] [address] any warranty or liability for your use of this information. Helping A Person With Dementia: Care Instructions Your Care Instructions Dementia is a loss of mental skills that affects daily life. It is different from mild memory loss that occurs with aging. Dementia can cause problems with memory, thinking clearly, and planning. It is different for everyone. But it usually gets worse slowly. Some people who have dementia can function well for a long time. But at some point it may become hard for the person to care for himself or herself. It can be upsetting to learn that a loved one has this condition. You may be afraid and worried about what will happen. You may wonder how you will care for the person. There is no cure for dementia. But medicine may be able to slow memory loss and improve thinking for a while. Other medicines may help with sleep, depression, and behavior changes. Dementia is different for everyone. In some cases, people can function well for a long time. You can help your loved one by making his or her home life easier and safer. You also need to take care of yourself. Caregiving can be stressful. But support is available to help you and give you a break when you need it. The Alzheimer's Association offers good information and support. If you are caring for someone with dementia, you can help make life safer and more comfortable. You can also help your loved one make decisions about future care. You may also want to bring up legal and financial issues. These are hard but important conversations to have. Follow-up care is a key part of your loved one's treatment and safety. Be sure to make and go to all appointments, and call your doctor if your loved one is having problems. It's also a good idea to know your loved one's test results and keep a list of the medicines he or she takes. How can you care for your loved one at home? Taking care of the person · If the person takes medicine for dementia, help him or her take it exactly as prescribed. Call the doctor if you notice any problems with the medicine. · Make a list of the person's medicines. Review it with all of his or her doctors. · Help the person eat a balanced diet. Serve plenty of whole grains, fruits, and vegetables every day. If the person is not hungry at mealtimes, give snacks at midmorning and in the afternoon. Offer drinks such as Boost, Ensure, or Sustacal if the person is losing weight. · Encourage exercise. Walking and other activities may slow the decline of mental ability. Help the person stay active mentally with reading, crossword puzzles, or other hobbies. · Take steps to help if the person is sundowning. This is the restless behavior and trouble with sleeping that may occur in late afternoon and at night. Try not to let the person nap during the day. Offer a glass of warm milk or caffeine-free tea before bedtime. · Develop a routine. Your loved one will feel less frustrated or confused with a clear, simple plan of what to do every day. · Be patient. A task may take the person longer than it used to. · For as long as he or she is able, allow your loved one to make decisions about activities, food, clothing, and other choices. Let him or her be independent, even if tasks take more time or are not done perfectly. Tailor tasks to the person's abilities. For example, if cooking is no longer safe, ask for other help.  Your loved one can help set the table, or make simple dishes such as a salad. When the person needs help, offer it gently. Staying safe · Make your home (or your loved one's home) safe. Tack down rugs, and put no-slip tape in the tub. Install handrails, and put safety switches on stoves and appliances. Keep rooms free of clutter. Make sure walkways around furniture are clear. Do not move furniture around, because the person may become confused. · Use locks on doors and cupboards. Lock up knives, scissors, medicines, cleaning supplies, and other dangerous things. · Do not let the person drive or cook if he or she can't do it safely. A person with dementia should not drive unless he or she is able to pass an on-road driving test. Your state 's license bureau can do a driving test if there is any question. · Get medical alert jewelry for the person so that you can be contacted if he or she wanders away. If possible, provide a safe place for wandering, such as an enclosed yard or garden. Taking care of yourself · Ask your doctor about support groups and other resources in your area. · Take care of your health. Be sure to eat healthy foods and get enough rest and exercise. · Take time for yourself. Respite services provide someone to stay with the person for a short time while you get out of the house for a few hours. · Make time for an activity that you enjoy. Read, listen to music, paint, do crafts, or play an instrument, even if it's only for a few minutes a day. · Spend time with family, friends, and others in your support system. When should you call for help? Call 911 anytime you think the person may need emergency care. For example, call if: 
? · The person who has dementia wanders away and you can't find him or her. ? · The person who has dementia is seriously injured. ?Call the doctor now or seek immediate medical care if: 
? · The person suddenly sees things that are not there (hallucinates). ? · The person has a sudden change in his or her behavior. ? Watch closely for changes in the person's health, and be sure to contact the doctor if: 
? · The person has symptoms that could cause injury. ? · The person has problems with his or her medicine. ? · You need more information to care for a person with dementia. ? · You need respite care so you can take a break. Where can you learn more? Go to http://nakul-quinten.info/. Enter T773 in the search box to learn more about \"Helping A Person With Dementia: Care Instructions. \" Current as of: May 12, 2017 Content Version: 11.4 © 3454-0810 [employer]. Care instructions adapted under license by [employer] (which disclaims liability or warranty for this information). If you have questions about a medical condition or this instruction, always ask your healthcare professional. [address] [address] any warranty or liability for your use of this information. Fatigue: Care Instructions Your Care Instructions Fatigue is a feeling of tiredness, exhaustion, or lack of energy. You may feel fatigue because of too much or not enough activity. It can also come from stress, lack of sleep, boredom, and poor diet. Many medical problems, such as viral infections, can cause fatigue. Emotional problems, especially depression, are often the cause of fatigue. Fatigue is most often a symptom of another problem. Treatment for fatigue depends on the cause. For example, if you have fatigue because you have a certain health problem, treating this problem also treats your fatigue. If depression or anxiety is the cause, treatment may help. Follow-up care is a key part of your treatment and safety. Be sure to make and go to all appointments, and call your doctor if you are having problems. It's also a good idea to know your test results and keep a list of the medicines you take. How can you care for yourself at home? · Get regular exercise. But don't overdo it. Go back and forth between rest and exercise. · Get plenty of rest. 
· Eat a healthy diet. Do not skip meals, especially breakfast. 
· Reduce your use of caffeine, tobacco, and alcohol. Caffeine is most often found in coffee, tea, cola drinks, and chocolate. · Limit medicines that can cause fatigue. This includes tranquilizers and cold and allergy medicines. When should you call for help? Watch closely for changes in your health, and be sure to contact your doctor if: 
? · You have new symptoms such as fever or a rash. ? · Your fatigue gets worse. ? · You have been feeling down, depressed, or hopeless. Or you may have lost interest in things that you usually enjoy. ? · You are not getting better as expected. Where can you learn more? Go to http://nakul-quinten.info/. Enter E363 in the search box to learn more about \"Fatigue: Care Instructions. \" Current as of: March 20, 2017 Content Version: 11.4 © 4683-0527 [employer]can. Care instructions adapted under license by [employer] (which disclaims liability or warranty for this information). If you have questions about a medical condition or this instruction, always ask your healthcare professional. [address] any warranty or liability for your use of this information. Hypothyroidism: Care Instructions Your Care Instructions You have hypothyroidism, which means that your body is not making enough thyroid hormone. This hormone helps your body use energy. If your thyroid level is low, you may feel tired, be constipated, have an increase in your blood pressure, or have dry skin or memory problems. You may also get cold easily, even when it is warm. Women with low thyroid levels may have heavy menstrual periods.  
A blood test to find your thyroid-stimulating hormone (TSH) level is used to check for hypothyroidism. A high TSH level may mean that you have low thyroid. When your body is not making enough thyroid hormone, TSH levels rise in an effort to make the body produce more. The treatment for hypothyroidism is to take thyroid hormone pills. You should start to feel better in 1 to 2 weeks. But it can take several months to see changes in the TSH level. You will need regular visits with your doctor to make sure you have the right dose of medicine. Most people need treatment for the rest of their lives. You will need to see your doctor regularly to have blood tests and to make sure you are doing well. Follow-up care is a key part of your treatment and safety. Be sure to make and go to all appointments, and call your doctor if you are having problems. It's also a good idea to know your test results and keep a list of the medicines you take. How can you care for yourself at home? · Take your thyroid hormone medicine exactly as prescribed. Call your doctor if you think you are having a problem with your medicine. Most people do not have side effects if they take the right amount of medicine regularly. ¨ Take the medicine 30 minutes before breakfast, and do not take it with calcium, vitamins, or iron. ¨ Do not take extra doses of your thyroid medicine. It will not help you get better any faster, and it may cause side effects. ¨ If you forget to take a dose, do NOT take a double dose of medicine. Take your usual dose the next day. · Tell your doctor about all prescription, herbal, or over-the-counter products you take. · Take care of yourself. Eat a healthy diet, get enough sleep, and get regular exercise. When should you call for help? Call 911 anytime you think you may need emergency care. For example, call if: 
? · You passed out (lost consciousness). ? · You have severe trouble breathing. ? · You have a very slow heartbeat (less than 60 beats a minute). ? · You have a low body temperature (95°F or below). ?Call your doctor now or seek immediate medical care if: 
? · You feel tired, sluggish, or weak. ? · You have trouble remembering things or concentrating. ? · You do not begin to feel better 2 weeks after starting your medicine. ? Watch closely for changes in your health, and be sure to contact your doctor if you have any problems. Where can you learn more? Go to http://nakul-quinten.info/. Enter E131 in the search box to learn more about \"Hypothyroidism: Care Instructions. \" Current as of: May 12, 2017 Content Version: 11.4 © 3461-1786 [employer]. Care instructions adapted under license by [employer] (which disclaims liability or warranty for this information). If you have questions about a medical condition or this instruction, always ask your healthcare professional. [place] [address] any warranty or liability for your use of this information. Briggo Announcement We are excited to announce that we are making your provider's discharge notes available to you in Briggo. You will see these notes when they are completed and signed by the physician that discharged you from your recent hospital stay. If you have any questions or concerns about any information you see in Briggo, please call the Health Information Department where you were seen or reach out to your Primary Care Provider for more information about your plan of care. Introducing [facility]! Carol Becker introduces Briggo patient portal. Now you can access parts of your medical record, email your doctor's office, and request medication refills online. 1. In your internet browser, go to [email or web address] 2. Click on the First Time User? Click Here link in the Sign In box. You will see the New Member Sign Up page. 3. Enter your Dropmysite Access Code exactly as it appears below. You will not need to use this code after youve completed the sign-up process. If you do not sign up before the expiration date, you must request a new code. · Dropmysite Access Code: [email or web address] Expires: 5/14/2018 12:06 PM 
 
4. Enter the last four digits of your Social Security Number (xxxx) and Date of Birth (mm/dd/yyyy) as indicated and click Submit. You will be taken to the next sign-up page. 5. Create a doot ID. This will be your Dropmysite login ID and cannot be changed, so think of one that is secure and easy to remember. 6. Create a Dropmysite password. You can change your password at any time. 7. Enter your Password Reset Question and Answer. This can be used at a later time if you forget your password. 8. Enter your e-mail address. You will receive e-mail notification when new information is available in [address]. 9. Click Sign Up. You can now view and download portions of your medical record. 10. Click the Download Summary menu link to download a portable copy of your medical information. If you have questions, please visit the Frequently Asked Questions section of the Dropmysite website. Remember, Dropmysite is NOT to be used for urgent needs. For medical emergencies, dial 911. Now available from your iPhone and Android! Unresulted Labs-Please follow up with your PCP about these lab tests Order Current Status CBC WITH AUTOMATED DIFF In process Providers Seen During Your Hospitalization Provider Specialty Primary office phone Castro Bautista MD Emergency Medicine [phone] Saintclair Meeter, MD Internal Medicine [phone] Louise Warner MD Internal Medicine [phone] Lynn Holley MD Internal Medicine [phone] Your Primary Care Physician (PCP) Primary Care Physician Office Phone Office Fax Simona Aguirreame [phone] You are allergic to the following No active allergies Recent Documentation Height Weight Breastfeeding? BMI  
  
 1.651 m 49.2 kg No 18.06 kg/m2 Emergency Contacts Name Discharge Info Relation Home Work Mobile Arabella White DISCHARGE CAREGIVER [3] Daughter [21]   [phone] Merle Cool  Other Relative [6] [phone] Patient Belongings The following personal items are in your possession at time of discharge: 
  Dental Appliances: None  Visual Aid: None      Home Medications: None   Jewelry: Ring (sent to security in valuables envelope # [id number])  Clothing: None    Other Valuables: None  Personal Items Sent to Safe: none Discharge Instructions Attachments/References SEPSIS (ENGLISH) DVT (DEEP VEIN THROMBOSIS) (ENGLISH) DVT (DEEP VEIN THROMBOSIS): GENERAL INFO (ENGLISH) APIXABAN (BY MOUTH) (ENGLISH) Patient Handouts Sepsis: Care Instructions Your Care Instructions Sepsis is an infection that has spread throughout your body. It is a life-threatening condition and often causes extremely low blood pressure. This can lead to problems with many different organs. The cause of sepsis is not always clear, but it can happen as part of a long-term or sudden illness. Sometimes even a mild illness can lead to sepsis. Follow-up care is a key part of your treatment and safety. Be sure to make and go to all appointments, and call your doctor if you are having problems. It's also a good idea to know your test results and keep a list of the medicines you take. How can you care for yourself at home? · If your doctor prescribed antibiotics, take them as directed. Do not stop taking them just because you feel better. You need to take the full course of antibiotics. · Drink plenty of fluids, enough so that your urine is light yellow or clear like water.  Choose water or caffeine-free clear liquids until you feel better. If you have kidney, heart, or liver disease and have to limit fluids, talk with your doctor before you increase your fluid intake. You can try rehydration drinks, such as Gatorade or Powerade. · Do not drink alcohol. · Eat a healthy diet. Include fruits, vegetables, and whole grains in your diet every day. · Walking is an easy way to get exercise. Gradually increase the amount you walk every day. Make sure your doctor knows that you are starting an exercise program. 
· Do not smoke or use other tobacco products. If you need help quitting, talk to your doctor about stop-smoking programs and medicines. These can increase your chances of quitting for good. When should you call for help? Call 911 anytime you think you may need emergency care. For example, call if: 
? · You passed out (lost consciousness). ?Call your doctor now or seek immediate medical care if: 
? · You have a fever or chills. ? · You have cool, pale, or clammy skin. ? · You are dizzy or lightheaded, or you feel like you may faint. ? · You have any new symptoms, such as a cough, pain in one part of your body, or urinary problems. ? Watch closely for changes in your health, and be sure to contact your doctor if: 
? · You do not get better as expected. Where can you learn more? Go to http://nakul-quinten.info/. Enter A902 in the search box to learn more about \"Sepsis: Care Instructions. \" Current as of: March 20, 2017 Content Version: 11.4 © 4406-5416 [employer] Money. Care instructions adapted under license by [employer]! (which disclaims liability or warranty for this information). If you have questions about a medical condition or this instruction, always ask your healthcare professional. [address] any warranty or liability for your use of this information. Deep Vein Thrombosis: Care Instructions Your Care Instructions A deep vein thrombosis (DVT) is a blood clot in certain veins of the legs, pelvis, or arms. Blood clots in these veins need to be treated because they can get bigger, break loose, and travel through the bloodstream to the lungs. A blood clot in a lung can be life-threatening. The doctor may have given you a blood thinner (anticoagulant). A blood thinner can stop the blood clot from growing larger and prevent new clots from forming. You will need to take a blood thinner for 3 to 6 months or longer. The doctor has checked you carefully, but problems can develop later. If you notice any problems or new symptoms, get medical treatment right away. Follow-up care is a key part of your treatment and safety. Be sure to make and go to all appointments, and call your doctor if you are having problems. It's also a good idea to know your test results and keep a list of the medicines you take. How can you care for yourself at home? · Take your medicines exactly as prescribed. Call your doctor if you think you are having a problem with your medicine. · If you are taking a blood thinner, be sure you get instructions about how to take your medicine safely. Blood thinners can cause serious bleeding problems. · Wear compression stockings if your doctor recommends them. These stockings are tighter at the feet than on the legs. They may reduce pain and swelling in your legs. But there are different types of stockings, and they need to fit right. So your doctor will recommend what you need. · When you sit, use a pillow to raise the arm or leg that has the blood clot. Try to keep it above the level of your heart. When should you call for help? Call 911 anytime you think you may need emergency care. For example, call if: 
? · You passed out (lost consciousness). ? · You have symptoms of a blood clot in your lung (called a pulmonary embolism). These include: 
¨ Sudden chest pain. ¨ Trouble breathing. ¨ Coughing up blood. ?Call your doctor now or seek immediate medical care if: 
? · You have new or worse trouble breathing. ? · You are dizzy or lightheaded, or you feel like you may faint. ? · You have symptoms of a blood clot in your arm or leg. These may include: 
¨ Pain in the arm, calf, back of the knee, thigh, or groin. ¨ Redness and swelling in the arm, leg, or groin. ? Watch closely for changes in your health, and be sure to contact your doctor if: 
? · You do not get better as expected. Where can you learn more? Go to http://nakul-quinten.info/. Enter F705 in the search box to learn more about \"Deep Vein Thrombosis: Care Instructions. \" Current as of: March 20, 2017 Content Version: 11.4 © 8145-4396 [employer]. Care instructions adapted under license by HylioSoft (which disclaims liability or warranty for this information). If you have questions about a medical condition or this instruction, always ask your healthcare professional. [place] [address] any warranty or liability for your use of this information. Learning About Deep Vein Thrombosis What is deep vein thrombosis? A deep vein thrombosis (DVT) is a blood clot in certain veins of the legs, pelvis, or arms. The clot is usually in the legs. DVT may damage the vein and cause the area to ache, swell, and change color. DVT also can lead to sores. DVT in these veins needs to be treated because the clots can get bigger, break loose, and travel through the bloodstream to the lungs. A blood clot in a lung can cause death. Blood clots can form in the veins when you are not active for a long period of time. For example, they can form if you need to stay in bed because of a health problem or must sit for a long time on an airplane or in a car. Surgery or an injury can damage your blood vessels and cause a clot to form. Cancer also can cause DVT.  And some people have blood that clots too easily, which is a problem that may run in families. A risk factor is something that makes you more likely to develop a disease. Here are some major risk factors for DVT: 
· You have surgery. · You have to stay in bed for more than 3 days (such as in the hospital). · Your blood is likely to clot because of an injury, cancer, or inherited condition. Here are some minor risk factors for DVT: 
· You take birth control hormones. · You are pregnant. · You are in a car or airplane for a long trip. What are the symptoms? Symptoms of DVT may include: · Swelling in the affected area. · Redness and warmth in the affected area. · Pain or tenderness. You may have pain only when you touch the affected area or when you stand or walk. If your doctor thinks you may have DVT, you will probably have an ultrasound test. You may have other tests as well. How can you prevent DVT? · Exercise your lower leg muscles to help blood flow in your legs. Point your toes up toward your head so the calves of your legs are stretched, then relax and repeat. This is a good exercise to do when you are sitting for long periods of time. · Get out of bed as soon as you can after an illness or surgery. If you need to stay in bed, do the leg exercise noted above every hour when you are awake. · Use special stockings called compression stockings. These stockings are tight at the feet with a gradually looser fit on the leg. Many doctors recommend that you wear compression stockings during a journey longer than 8 hours. · Take breaks when you are on long trips. Stop the car and walk around. On long airplane flights, walk up and down the aisle hourly, flex and point your feet every 20 minutes while sitting, and drink plenty of water. · Take blood-thinning medicines after some types of surgery if your doctor recommends it. Blood thinners also may be used if you are likely to develop clots. How is DVT treated? Treatment for DVT usually involves taking blood thinners. These medicines are given through a vein (intravenously, or IV) or as a pill. Talk with your doctor about which medicine is right for you. Your doctor also may suggest that you prop up or elevate your leg when possible, take walks, and wear compression stockings. These measures may help reduce the pain and swelling that can happen with DVT. Follow-up care is a key part of your treatment and safety. Be sure to make and go to all appointments, and call your doctor if you are having problems. It's also a good idea to know your test results and keep a list of the medicines you take. Where can you learn more? Go to http://nakul-quinten.info/. Enter S481 in the search box to learn more about \"Learning About Deep Vein Thrombosis. \" Current as of: March 20, 2017 Content Version: 11.4 © 1657-5443 [employer]. Care instructions adapted under license by Briggo (which disclaims liability or warranty for this information). If you have questions about a medical condition or this instruction, always ask your healthcare professional. [place] [address] any warranty or liability for your use of this information. Apixaban (By mouth) Apixaban (a-PIX-a-ban) Treats and prevents blood clots. This medicine is a blood thinner. Brand Name(s): Eliquis There may be other brand names for this medicine. When This Medicine Should Not Be Used: This medicine is not right for everyone. Do not use it if you had an allergic reaction to apixaban or you have active bleeding. How to Use This Medicine:  
Tablet · Your doctor will tell you how much medicine to use. Do not use more than directed. · If you are not able to swallow the tablets whole, they may be crushed and mixed in water, 5% dextrose in water (D5W), apple juice, or applesauce.  The crushed tablets may be mixed with 60 mL of water or D5W dose and given through a nasogastric tube (NGT). · This medicine should come with a Medication Guide. Ask your pharmacist for a copy if you do not have one. · Missed dose: Take a dose as soon as you remember. If it is almost time for your next dose, wait until then and take a regular dose. Do not take extra medicine to make up for a missed dose. · Store the medicine in a closed container at room temperature, away from heat, moisture, and direct light. Drugs and Foods to Avoid: Ask your doctor or pharmacist before using any other medicine, including over-the-counter medicines, vitamins, and herbal products. · Some medicines can affect how apixaban works. Tell your doctor if you are using any of the following: ¨ Carbamazepine, clarithromycin, itraconazole, ketoconazole, phenytoin, rifampin, ritonavir, Josefina's wort ¨ Blood thinner (including clopidogrel, heparin, prasugrel, warfarin) ¨ Medicine to treat depression ¨ NSAID pain or arthritis medicine (including aspirin, celecoxib, diclofenac, ibuprofen, naproxen) Warnings While Using This Medicine: · Tell your doctor if you are pregnant or breastfeeding, or if you have kidney disease, liver disease, bleeding problems, or an artificial heart valve. · Do not stop using this medicine suddenly without asking your doctor. You might have a higher risk of stroke for a short time after you stop using this medicine. · This medicine increases your risk for bleeding that can become serious if not controlled. You may also bruise easily, and it may take longer than usual for bleeding to stop. · This medicine may increase your risk for blood clots in your spine or back if you undergo an epidural or spinal puncture. This could lead to paralysis. Tell your doctor if you ever had spine problems or back surgery. · Tell any doctor or dentist who treats you that you are using this medicine.  With your doctor's supervision, you may need to stop using this medicine several days before you have surgery or medical tests. · Your doctor will do lab tests at regular visits to check on the effects of this medicine. Keep all appointments. · Keep all medicine out of the reach of children. Never share your medicine with anyone. Possible Side Effects While Using This Medicine:  
Call your doctor right away if you notice any of these side effects: · Allergic reaction: Itching or hives, swelling in your face or hands, swelling or tingling in your mouth or throat, chest tightness, trouble breathing · Change in how much or how often you urinate, red or pink urine · Chest pain, trouble breathing · Coughing up blood, vomiting blood or material that looks like coffee grounds · Numbness, tingling, or muscle weakness in your legs or feet · Red or black, tarry stools · Unusual bleeding, bruising, or weakness If you notice other side effects that you think are caused by this medicine, tell your doctor. Call your doctor for medical advice about side effects. You may report side effects to FDA at 3-657-FDA-3911 © 2017 [address] Information is for End User's use only and may not be sold, redistributed or otherwise used for commercial purposes. The above information is an  only. It is not intended as medical advice for individual conditions or treatments. Talk to your doctor, nurse or pharmacist before following any medical regimen to see if it is safe and effective for you. Please provide this summary of care documentation to your next provider. Signatures-by signing, you are acknowledging that this After Visit Summary has been reviewed with you and you have received a copy. Patient Signature:  ____________________________________________________________ Date:  ____________________________________________________________  
  
Lita Merida  Provider Signature: ____________________________________________________________ Date:  ____________________________________________________________

## 2018-02-22 NOTE — H&P
GENERAL GENERIC H&P/CONSULT      Mane Trevino is a 80 y.o. female with hypothyroidism who presents to the ED from nursing home. Hx is limited 2/2 lack of records in EMR, patient condition and no current caretaker at bedside. But per ED   Notes patient was brought in via EMS for fatigue and weakness for unknown duration. Patient apparently comes from nursing home. Upon arrival patient was found to be febrile (101.5) and hypotensive susequently was given IVF however refractory to this thus was started on pressors. Upon my arrival all patient states to me is that she is cold. Patient will be admitted to the ICU for further treatment and evaluation. History reviewed. No pertinent past medical history. History reviewed. No pertinent surgical history. Prior to Admission medications    Medication Sig Start Date End Date Taking? Authorizing Provider   aspirin delayed-release 81 mg tablet Take 81 mg by mouth daily. Historical Provider   atorvastatin (LIPITOR) 40 mg tablet Take 40 mg by mouth daily. Historical Provider   levothyroxine (SYNTHROID) 75 mcg tablet Take 75 mcg by mouth Daily (before breakfast). Historical Provider   MAGNESIUM OXIDE, BULK, Take 400 mg by mouth daily. Historical Provider   polyethylene glycol (MIRALAX) 17 gram packet Take 17 g by mouth daily. Historical Provider   MULTIVIT-MINERALS/FERROUS FUM (MULTI VITAMIN PO) Take 1 Tab by mouth daily. Historical Provider   memantine (NAMENDA) 10 mg tablet Take 14 mg by mouth daily. 2/9/18 2/22/18  Historical Provider   memantine (NAMENDA) 10 mg tablet Take 21 mg by mouth daily. 2/23/18 3/8/18  Historical Provider   memantine (NAMENDA) 10 mg tablet Take 28 mg by mouth daily. 3/9/18   Historical Provider   calcium-vitamin D (OYSTER SHELL) 500 mg(1,250mg) -200 unit per tablet Take 1 Tab by mouth daily. Historical Provider   mirtazapine (REMERON) 7.5 mg tablet Take 7.5 mg by mouth nightly.     Historical Provider     No Known Allergies Social History   Substance Use Topics    Smoking status: Not on file    Smokeless tobacco: Not on file    Alcohol use Not on file      Family History   Problem Relation Age of Onset    Family history unknown: Yes      Review of Systems   Unable to perform ROS: Acuity of condition   Constitutional: Negative for activity change.      Unable to obtain family hx 2/2 Acuity of condition and no family/caretaker at bedside or hx in EMR  Objective:          Patient Vitals for the past 8 hrs:   BP Temp Pulse Resp SpO2 Height Weight   02/22/18 1355 100/45 - (!) 106 25 100 % - -   02/22/18 1354 - - (!) 104 21 100 % - -   02/22/18 1353 - - (!) 105 24 100 % - -   02/22/18 1352 - - (!) 106 20 100 % - -   02/22/18 1351 - - (!) 106 23 100 % - -   02/22/18 1350 (!) 126/101 - (!) 106 26 100 % - -   02/22/18 1345 102/60 - (!) 107 27 100 % - -   02/22/18 1340 115/53 - (!) 107 29 100 % - -   02/22/18 1339 - - (!) 106 28 100 % - -   02/22/18 1338 - - (!) 107 27 100 % - -   02/22/18 1337 - - (!) 107 27 100 % - -   02/22/18 1336 - - (!) 106 28 100 % - -   02/22/18 1335 105/52 - (!) 106 27 100 % - -   02/22/18 1330 104/55 - (!) 107 29 100 % - -   02/22/18 1329 - - (!) 108 30 100 % - -   02/22/18 1328 - - (!) 108 29 100 % - -   02/22/18 1327 - - (!) 109 28 99 % - -   02/22/18 1326 - - (!) 108 30 100 % - -   02/22/18 1325 106/52 - (!) 108 (!) 31 100 % - -   02/22/18 1320 110/58 - (!) 110 (!) 33 99 % - -   02/22/18 1319 - - (!) 110 (!) 34 100 % - -   02/22/18 1318 - - (!) 110 (!) 32 100 % - -   02/22/18 1317 - - (!) 109 30 98 % - -   02/22/18 1316 - - (!) 110 (!) 34 98 % - -   02/22/18 1315 102/54 - (!) 110 (!) 32 90 % - -   02/22/18 1310 97/62 - (!) 111 28 97 % - -   02/22/18 1305 109/60 - (!) 114 (!) 36 99 % - -   02/22/18 1304 - - (!) 114 (!) 38 96 % - -   02/22/18 1303 - - (!) 115 (!) 37 98 % - -   02/22/18 1302 - - (!) 115 29 95 % - -   02/22/18 1301 - - (!) 114 29 97 % - -   02/22/18 1300 94/67 - (!) 114 28 99 % - -   02/22/18 1256 - - (!) 115 29 100 % - -   02/22/18 1255 110/58 - - - - - -   02/22/18 1252 (!) 70/53 - (!) 118 (!) 32 100 % - -   02/22/18 1250 (!) 49/28 - (!) 118 (!) 35 (!) 83 % - -   02/22/18 1245 (!) 56/41 - (!) 114 (!) 34 (!) 81 % - -   02/22/18 1242 (!) 70/39 - - - - - -   02/22/18 1230 (!) 80/51 - (!) 108 28 (!) 75 % - -   02/22/18 1229 - - (!) 109 (!) 31 - - -   02/22/18 1228 - - (!) 109 (!) 31 - - -   02/22/18 1227 (!) 70/46 - (!) 108 (!) 31 (!) 78 % - -   02/22/18 1222 (!) 73/48 - (!) 103 22 91 % - -   02/22/18 1210 (!) 85/54 - 99 24 97 % - -   02/22/18 1152 93/47 - 97 26 - - -   02/22/18 1145 (!) 90/39 - 95 25 - - -   02/22/18 1143 92/45 - 95 22 - - -   02/22/18 1115 91/50 - (!) 102 27 94 % - -   02/22/18 1100 96/54 - (!) 106 27 94 % - -   02/22/18 1059 - - (!) 103 26 94 % - -   02/22/18 1051 - (!) 101.5 °F (38.6 °C) - - - - -   02/22/18 1038 94/49 - (!) 104 23 96 % 5' 5\" (1.651 m) 41.3 kg (91 lb)   02/22/18 1034 94/49 - - - - - -     Physical Exam   Constitutional: No distress. HENT:   Head: Normocephalic. Cardiovascular: Normal rate. Pulmonary/Chest: Effort normal and breath sounds normal. No respiratory distress. She has no wheezes. She has no rales. She exhibits no tenderness. Abdominal: Soft. Bowel sounds are normal. She exhibits no distension. There is no rebound. Musculoskeletal: Normal range of motion. Neurological:   Drowsy on exam   Skin: She is not diaphoretic.    Stage 1 to sacral bottom        Labs:    Recent Results (from the past 24 hour(s))   CARDIAC PANEL,(CK, CKMB & TROPONIN)    Collection Time: 02/22/18 10:30 AM   Result Value Ref Range    CK 47 26 - 192 U/L    CK - MB <1.0 <3.6 ng/ml    CK-MB Index  0.0 - 4.0 %     CALCULATION NOT PERFORMED WHEN RESULT IS BELOW LINEAR LIMIT    Troponin-I, Qt. 0.73 (H) 0.0 - 0.045 NG/ML   CBC W/O DIFF    Collection Time: 02/22/18 10:30 AM   Result Value Ref Range    WBC 17.4 (H) 4.6 - 13.2 K/uL    RBC 3.91 (L) 4.20 - 5.30 M/uL    HGB 11.0 (L) 12.0 - 16.0 g/dL    HCT 34.2 (L) 35.0 - 45.0 %    MCV 87.5 74.0 - 97.0 FL    MCH 28.1 24.0 - 34.0 PG    MCHC 32.2 31.0 - 37.0 g/dL    RDW 16.1 (H) 11.6 - 14.5 %    PLATELET 284 060 - 498 K/uL    MPV 11.8 9.2 - 05.1 FL   METABOLIC PANEL, COMPREHENSIVE    Collection Time: 02/22/18 10:30 AM   Result Value Ref Range    Sodium 142 136 - 145 mmol/L    Potassium 4.3 3.5 - 5.5 mmol/L    Chloride 106 100 - 108 mmol/L    CO2 26 21 - 32 mmol/L    Anion gap 10 3.0 - 18 mmol/L    Glucose 116 (H) 74 - 99 mg/dL    BUN 29 (H) 7.0 - 18 MG/DL    Creatinine 1.32 (H) 0.6 - 1.3 MG/DL    BUN/Creatinine ratio 22 (H) 12 - 20      GFR est AA 46 (L) >60 ml/min/1.73m2    GFR est non-AA 38 (L) >60 ml/min/1.73m2    Calcium 7.9 (L) 8.5 - 10.1 MG/DL    Bilirubin, total 0.6 0.2 - 1.0 MG/DL    ALT (SGPT) 17 13 - 56 U/L    AST (SGOT) 26 15 - 37 U/L    Alk.  phosphatase 130 (H) 45 - 117 U/L    Protein, total 6.4 6.4 - 8.2 g/dL    Albumin 2.0 (L) 3.4 - 5.0 g/dL    Globulin 4.4 (H) 2.0 - 4.0 g/dL    A-G Ratio 0.5 (L) 0.8 - 1.7     NT-PRO BNP    Collection Time: 02/22/18 10:30 AM   Result Value Ref Range    NT pro-BNP >25565 (H) 0 - 1800 PG/ML   TSH 3RD GENERATION    Collection Time: 02/22/18 10:30 AM   Result Value Ref Range    TSH 5.37 (H) 0.36 - 3.74 uIU/mL   T4, FREE    Collection Time: 02/22/18 10:30 AM   Result Value Ref Range    T4, Free 0.9 0.7 - 1.5 NG/DL   CULTURE, BLOOD    Collection Time: 02/22/18 10:30 AM   Result Value Ref Range    Special Requests: PERIPHERAL      Culture result: PENDING    CULTURE, BLOOD    Collection Time: 02/22/18 10:48 AM   Result Value Ref Range    Special Requests: PERIPHERAL      Culture result: PENDING    POC LACTIC ACID    Collection Time: 02/22/18 10:50 AM   Result Value Ref Range    Lactic Acid (POC) 3.8 (HH) 0.4 - 2.0 mmol/L   EKG, 12 LEAD, INITIAL    Collection Time: 02/22/18 11:02 AM   Result Value Ref Range    Ventricular Rate 105 BPM    Atrial Rate 105 BPM    P-R Interval 138 ms    QRS Duration 88 ms    Q-T Interval 352 ms    QTC Calculation (Bezet) 465 ms    Calculated P Axis 76 degrees    Calculated R Axis -60 degrees    Calculated T Axis 81 degrees    Diagnosis       Sinus tachycardia  Left axis deviation  Inferior infarct , age undetermined  Anteroseptal infarct , age undetermined  Abnormal ECG  No previous ECGs available     URINALYSIS W/ RFLX MICROSCOPIC    Collection Time: 02/22/18 11:12 AM   Result Value Ref Range    Color DARK YELLOW      Appearance CLEAR      Specific gravity 1.027 1.005 - 1.030      pH (UA) 5.0 5.0 - 8.0      Protein TRACE (A) NEG mg/dL    Glucose NEGATIVE  NEG mg/dL    Ketone TRACE (A) NEG mg/dL    Bilirubin SMALL (A) NEG      Blood NEGATIVE  NEG      Urobilinogen 1.0 0.2 - 1.0 EU/dL    Nitrites NEGATIVE  NEG      Leukocyte Esterase TRACE (A) NEG     URINE MICROSCOPIC ONLY    Collection Time: 02/22/18 11:12 AM   Result Value Ref Range    WBC 0 to 2 0 - 4 /hpf    RBC 0 to 1 0 - 5 /hpf    Epithelial cells FEW 0 - 5 /lpf    Bacteria FEW (A) NEG /hpf   INFLUENZA A & B AG (RAPID TEST)    Collection Time: 02/22/18 11:27 AM   Result Value Ref Range    Influenza A Antigen NEGATIVE  NEG      Influenza B Antigen NEGATIVE  NEG     EKG, 12 LEAD, SUBSEQUENT    Collection Time: 02/22/18 11:51 AM   Result Value Ref Range    Ventricular Rate 96 BPM    Atrial Rate 96 BPM    P-R Interval 152 ms    QRS Duration 80 ms    Q-T Interval 424 ms    QTC Calculation (Bezet) 535 ms    Calculated P Axis 75 degrees    Calculated R Axis -59 degrees    Calculated T Axis 63 degrees    Diagnosis       Sinus rhythm with premature atrial complexes  Left anterior fascicular block  Cannot rule out Inferior infarct (cited on or before 22-FEB-2018)  Anteroseptal infarct (cited on or before 22-FEB-2018)  Abnormal ECG  When compared with ECG of 22-FEB-2018 11:02,  premature atrial complexes are now present  QT has lengthened     POC LACTIC ACID    Collection Time: 02/22/18  1:53 PM   Result Value Ref Range    Lactic Acid (POC) 2.5 (HH) 0.4 - 2.0 mmol/L           Assessment:  Principal Problem:    Sepsis associated hypotension ([facility] (2/22/2018)    Active Problems:    Acquired hypothyroidism (2/22/2018)      KILO (acute kidney injury) ([facility] [address] (2/22/2018)      Elevated troponin (2/22/2018)        Plan:    Sepsis of unclear source  -febrile, hypotensive, leukocytosis  -blood cultures  -broad spectum abx  -lactic elevated likely 2/2 hypotension  -check procal  -UA negative for UTI  -Chest x-ray essential unremarkable for pneumonia  -non hypoxic  -do not think meningitis however unclear how baseline  -BNP elevated check ECHO does not look overloaded on exam  -IVF    KILO  -marginally elevaetd  -IVF  -monitor    Elevated troponin  -no cp  -EKG shows no ST elevation  -likely demand ischemia    Hypothyroidism  -TSH  -synthyroid  -switch to IV    DVT prophylaxis  -scd/teds  -hepari sfq    Signed:  Renny Delcid NP 2/22/2018

## 2018-02-22 NOTE — ED NOTES
Pt's BP obtained manually per MD request. BP 70/39 manually with faint brachial pulse. MD notified. Verbal order to start Levophed. MD ordering central line placement.

## 2018-02-22 NOTE — CONSULTS
[facility]  Pulmonary, Critical Care, and Sleep Medicine     Name: Marissa Bangura MRN: [id number]   : 3/9/1928 Hospital: [facility]   Date of Service: 2018        Critical Care Consult          Consult requesting physician: Dr. Zuhair Bowden  Reason for Consult: sepsis    HISTORY OF PRESENT ILLNESS:     This 80 y.o.  female nursing home resident is seen in consultation at the request of Dr. Zuhair Bowden for recommendations on further evaluation and management of sepsis. The patient has severe dementia. She believes that Luisito Burrows (not Luis Fernando) is POTUS. The last president she voted for was FDR. She was apparently admitted for further evaluation and management of sepsis. I was called to see this patient by the nurse, prior to receiving consultation request, because the patient was in a regular, narrow-complex, tachyarrhythmia (HR 170s) and was starting to become hypotensive. Administration of adenosine 6 mg (single dose) resulted in conversion of a supraventricular tachycardia to sinus tachcardia (). She reportedly presented to the ER from her nursing home with complaints of generalized fatigue and weakness for an undisclosed amount of time. The patient (based on conversation with Dr. Jackeline Thomas) was less interactive/conversant in the ER than she is during my clinical interview. Unfortunately, she currently has a childish affect and reveals the severity of her dementia. She was febrile (101.5) and hypotensive, which was reported to be refractory to IV fluid resuscitation. Currently, on norepinephrine gtt. Review of Systems:  Review of systems not obtained due to patient factors. No Known Allergies       PAST MEDICAL/SOCIAL/FAMILY HISTORIES     History reviewed. No pertinent past medical history. History reviewed. No pertinent surgical history.    Social History   Substance Use Topics    Smoking status: Not on file    Smokeless tobacco: Not on file    Alcohol use Not on file      Family History   Problem Relation Age of Onset    Family history unknown: Yes      Prior to Admission medications    Medication Sig Start Date End Date Taking? Authorizing Provider   aspirin delayed-release 81 mg tablet Take 81 mg by mouth daily. Historical Provider   atorvastatin (LIPITOR) 40 mg tablet Take 40 mg by mouth daily. Historical Provider   levothyroxine (SYNTHROID) 75 mcg tablet Take 75 mcg by mouth Daily (before breakfast). Historical Provider   MAGNESIUM OXIDE, BULK, Take 400 mg by mouth daily. Historical Provider   polyethylene glycol (MIRALAX) 17 gram packet Take 17 g by mouth daily. Historical Provider   MULTIVIT-MINERALS/FERROUS FUM (MULTI VITAMIN PO) Take 1 Tab by mouth daily. Historical Provider   memantine (NAMENDA) 10 mg tablet Take 14 mg by mouth daily. 2/9/18 2/22/18  Historical Provider   memantine (NAMENDA) 10 mg tablet Take 21 mg by mouth daily. 2/23/18 3/8/18  Historical Provider   memantine (NAMENDA) 10 mg tablet Take 28 mg by mouth daily. 3/9/18   Historical Provider   calcium-vitamin D (OYSTER SHELL) 500 mg(1,250mg) -200 unit per tablet Take 1 Tab by mouth daily. Historical Provider   mirtazapine (REMERON) 7.5 mg tablet Take 7.5 mg by mouth nightly.     Historical Provider     Current Facility-Administered Medications   Medication Dose Route Frequency    piperacillin-tazobactam (ZOSYN) 4.5 g in 0.9% sodium chloride (MBP/ADV) 100 mL MBP  4.5 g IntraVENous Q6H    [START ON 2/24/2018] levoFLOXacin (LEVAQUIN) 500 mg in D5W IVPB  500 mg IntraVENous Q48H    VANCOMYCIN, Pharmacy dosing per level  1 Each Other Rx Dosing/Monitoring    [START ON 2/23/2018] VANCOMYCIN INFORMATION NOTE   Other ONCE    NOREPINephrine (LEVOPHED) 8 mg in 5% dextrose 250mL infusion  2-16 mcg/min IntraVENous TITRATE    [START ON 2/23/2018] aspirin delayed-release tablet 81 mg  81 mg Oral DAILY    [START ON 2/23/2018] atorvastatin (LIPITOR) tablet 40 mg  40 mg Oral DAILY    sodium chloride (NS) flush 5-10 mL  5-10 mL IntraVENous Q8H    0.9% sodium chloride infusion  50 mL/hr IntraVENous CONTINUOUS    heparin (porcine) injection 5,000 Units  5,000 Units SubCUTAneous Q8H    [START ON 2018] levothyroxine (SYNTHROID) injection 37.5 mcg  37.5 mcg IntraVENous Q24H    adenosine (ADENOCARD) 3 mg/mL injection        adenosine (ADENOCARD) injection 6 mg  6 mg IntraVENous ONCE       Objective: Intake/Output:   Last shift:           Last 3 shifts:      No intake or output data in the 24 hours ending 18 1740    Last 3 Recorded Weights in this Encounter    18 1038   Weight: 41.3 kg (91 lb)       Hemodynamics:   . @MAP  63   . @CVP         Physical Exam:  Vital Signs:    Visit Vitals    BP 96/52    Pulse (!) 102    Temp (!) 101.5 °F (38.6 °C)    Resp 21    Ht 5' 5\" (1.651 m)    Wt 41.3 kg (91 lb)    SpO2 100%    BMI 15.14 kg/m2       O2 Device: Room air       Temp (24hrs), Av.5 °F (38.6 °C), Min:101.5 °F (38.6 °C), Max:101.5 °F (38.6 °C)       General: alert, awake and oriented to person, place. Cooperative. No acute distress. Head: atraumatic, normocephalic  Eye: PERRLA, EOM intact, no scleral icterus, no pallor, no cyanosis  Nose: Nares are patent. No polyps. No exudate. No sinus tenderness. Throat: No oral thrush. No exudate. Mucous membranes are moist. No tonsillar enlargement. Neck: supple, no thyromegaly, no JVD, no lymphadenopathy. Trachea midline  Lung: Symmetric in development and expansion. Good air entry. No crackles. No wheezes. Heart: Regular S1, S2 without murmur, rub or gallop. Abdomen: soft, nontender, nondistended. Normoactive bowel sounds. No rebound. No guarding. Bryant in situ with visibly turbid urine, yellow, opaque fluid.    Extremities: no pedal edema, no cyanosis, no clubbing, 2+ peripheral pulses in DP  Lymphatic: no cervical/axillary/inguinal lymphadenopathy  Neurologic: Cranial nerves II-XII are grossly symmetric and physiologic. Babinski negative. No sensory deficit. No motor deficit. DTR [email or web address], 2+@LUE, 2+@RLE, 2+@LLE. No cerebellar signs. Gait was not assessed. Skin: No rash or lesion. Data:     Recent Results (from the past 24 hour(s))   CARDIAC PANEL,(CK, CKMB & TROPONIN)    Collection Time: 02/22/18 10:30 AM   Result Value Ref Range    CK 47 26 - 192 U/L    CK - MB <1.0 <3.6 ng/ml    CK-MB Index  0.0 - 4.0 %     CALCULATION NOT PERFORMED WHEN RESULT IS BELOW LINEAR LIMIT    Troponin-I, Qt. 0.73 (H) 0.0 - 0.045 NG/ML   CBC W/O DIFF    Collection Time: 02/22/18 10:30 AM   Result Value Ref Range    WBC 17.4 (H) 4.6 - 13.2 K/uL    RBC 3.91 (L) 4.20 - 5.30 M/uL    HGB 11.0 (L) 12.0 - 16.0 g/dL    HCT 34.2 (L) 35.0 - 45.0 %    MCV 87.5 74.0 - 97.0 FL    MCH 28.1 24.0 - 34.0 PG    MCHC 32.2 31.0 - 37.0 g/dL    RDW 16.1 (H) 11.6 - 14.5 %    PLATELET 788 266 - 850 K/uL    MPV 11.8 9.2 - 99.2 FL   METABOLIC PANEL, COMPREHENSIVE    Collection Time: 02/22/18 10:30 AM   Result Value Ref Range    Sodium 142 136 - 145 mmol/L    Potassium 4.3 3.5 - 5.5 mmol/L    Chloride 106 100 - 108 mmol/L    CO2 26 21 - 32 mmol/L    Anion gap 10 3.0 - 18 mmol/L    Glucose 116 (H) 74 - 99 mg/dL    BUN 29 (H) 7.0 - 18 MG/DL    Creatinine 1.32 (H) 0.6 - 1.3 MG/DL    BUN/Creatinine ratio 22 (H) 12 - 20      GFR est AA 46 (L) >60 ml/min/1.73m2    GFR est non-AA 38 (L) >60 ml/min/1.73m2    Calcium 7.9 (L) 8.5 - 10.1 MG/DL    Bilirubin, total 0.6 0.2 - 1.0 MG/DL    ALT (SGPT) 17 13 - 56 U/L    AST (SGOT) 26 15 - 37 U/L    Alk.  phosphatase 130 (H) 45 - 117 U/L    Protein, total 6.4 6.4 - 8.2 g/dL    Albumin 2.0 (L) 3.4 - 5.0 g/dL    Globulin 4.4 (H) 2.0 - 4.0 g/dL    A-G Ratio 0.5 (L) 0.8 - 1.7     NT-PRO BNP    Collection Time: 02/22/18 10:30 AM   Result Value Ref Range    NT pro-BNP >91838 (H) 0 - 1800 PG/ML   TSH 3RD GENERATION    Collection Time: 02/22/18 10:30 AM   Result Value Ref Range    TSH 5.37 (H) 0.36 - 3.74 uIU/mL   T4, FREE    Collection Time: 02/22/18 10:30 AM   Result Value Ref Range    T4, Free 0.9 0.7 - 1.5 NG/DL   CULTURE, BLOOD    Collection Time: 02/22/18 10:30 AM   Result Value Ref Range    Special Requests: PERIPHERAL      Culture result: PENDING    CULTURE, BLOOD    Collection Time: 02/22/18 10:48 AM   Result Value Ref Range    Special Requests: PERIPHERAL      Culture result: PENDING    POC LACTIC ACID    Collection Time: 02/22/18 10:50 AM   Result Value Ref Range    Lactic Acid (POC) 3.8 (HH) 0.4 - 2.0 mmol/L   EKG, 12 LEAD, INITIAL    Collection Time: 02/22/18 11:02 AM   Result Value Ref Range    Ventricular Rate 105 BPM    Atrial Rate 105 BPM    P-R Interval 138 ms    QRS Duration 88 ms    Q-T Interval 352 ms    QTC Calculation (Bezet) 465 ms    Calculated P Axis 76 degrees    Calculated R Axis -60 degrees    Calculated T Axis 81 degrees    Diagnosis       Sinus tachycardia  Left axis deviation  Inferior infarct , age undetermined  Anteroseptal infarct , age undetermined  Abnormal ECG  No previous ECGs available     URINALYSIS W/ RFLX MICROSCOPIC    Collection Time: 02/22/18 11:12 AM   Result Value Ref Range    Color DARK YELLOW      Appearance CLEAR      Specific gravity 1.027 1.005 - 1.030      pH (UA) 5.0 5.0 - 8.0      Protein TRACE (A) NEG mg/dL    Glucose NEGATIVE  NEG mg/dL    Ketone TRACE (A) NEG mg/dL    Bilirubin SMALL (A) NEG      Blood NEGATIVE  NEG      Urobilinogen 1.0 0.2 - 1.0 EU/dL    Nitrites NEGATIVE  NEG      Leukocyte Esterase TRACE (A) NEG     URINE MICROSCOPIC ONLY    Collection Time: 02/22/18 11:12 AM   Result Value Ref Range    WBC 0 to 2 0 - 4 /hpf    RBC 0 to 1 0 - 5 /hpf    Epithelial cells FEW 0 - 5 /lpf    Bacteria FEW (A) NEG /hpf   INFLUENZA A & B AG (RAPID TEST)    Collection Time: 02/22/18 11:27 AM   Result Value Ref Range    Influenza A Antigen NEGATIVE  NEG      Influenza B Antigen NEGATIVE  NEG     EKG, 12 LEAD, SUBSEQUENT    Collection Time: 02/22/18 11:51 AM   Result Value Ref Range    Ventricular Rate 96 BPM    Atrial Rate 96 BPM    P-R Interval 152 ms    QRS Duration 80 ms    Q-T Interval 424 ms    QTC Calculation (Bezet) 535 ms    Calculated P Axis 75 degrees    Calculated R Axis -59 degrees    Calculated T Axis 63 degrees    Diagnosis       Sinus rhythm with premature atrial complexes  Left anterior fascicular block  Cannot rule out Inferior infarct (cited on or before 22-FEB-2018)  Anteroseptal infarct (cited on or before 22-FEB-2018)  Abnormal ECG  When compared with ECG of 22-FEB-2018 11:02,  premature atrial complexes are now present  QT has lengthened     POC LACTIC ACID    Collection Time: 02/22/18  1:53 PM   Result Value Ref Range    Lactic Acid (POC) 2.5 (HH) 0.4 - 2.0 mmol/L   EKG, 12 LEAD, SUBSEQUENT    Collection Time: 02/22/18  4:51 PM   Result Value Ref Range    Ventricular Rate 172 BPM    Atrial Rate 19 BPM    QRS Duration 86 ms    Q-T Interval 278 ms    QTC Calculation (Bezet) 470 ms    Calculated R Axis -56 degrees    Calculated T Axis 95 degrees    Diagnosis       Supraventricular tachycardia  Left axis deviation  Inferior infarct (cited on or before 22-FEB-2018)  Anteroseptal infarct (cited on or before 22-FEB-2018)  Abnormal ECG  When compared with ECG of 22-FEB-2018 11:51,  Significant changes have occurred             No results for input(s): FIO2I, IFO2, HCO3I, IHCO3, HCOPOC, PCO2I, PCOPOC, IPHI, PHI, PHPOC, PO2I, PO2POC in the last 72 hours. No lab exists for component: IPOC2    Recent Labs      02/22/18   1030   WBC  17.4*   HGB  11.0*   HCT  34.2*   PLT  261     Recent Labs      02/22/18   1030   NA  142   K  4.3   CL  106   CO2  26   GLU  116*   BUN  29*   CREA  1.32*   CA  7.9*   ALB  2.0*   SGOT  26   ALT  17     No results for input(s): PH, PCO2, PO2, HCO3, FIO2 in the last 72 hours.     Lab Results   Component Value Date/Time    Color DARK YELLOW 02/22/2018 11:12 AM    Appearance CLEAR 02/22/2018 11:12 AM    Specific gravity 1.027 02/22/2018 11:12 AM    pH (UA) 5.0 02/22/2018 11:12 AM    Protein TRACE (A) 02/22/2018 11:12 AM    Glucose NEGATIVE  02/22/2018 11:12 AM    Ketone TRACE (A) 02/22/2018 11:12 AM    Bilirubin SMALL (A) 02/22/2018 11:12 AM    Urobilinogen 1.0 02/22/2018 11:12 AM    Nitrites NEGATIVE  02/22/2018 11:12 AM    Leukocyte Esterase TRACE (A) 02/22/2018 11:12 AM    Epithelial cells FEW 02/22/2018 11:12 AM    Bacteria FEW (A) 02/22/2018 11:12 AM    WBC 0 to 2 02/22/2018 11:12 AM    RBC 0 to 1 02/22/2018 11:12 AM       Telemetry: SVT, converted to sinus tachycardia () after adenosine 6 mg IV push    Imaging:  [x] I have personally reviewed the patients radiographs  [] Radiographs reviewed with radiologist  [] No CXR study available for review today  [] No change from prior study, tubes and lines are in adequate position  [] Improved   []Worsening      ASSESSMENT:   Principal Problem:    Shock ([address] (2/22/2018)      Overview: Urine may be a source of infection for possible septic shock; however,       SvO2 should be checked in light of abnormal troponin and pro-BNP,       suspicious for cardiogenic shock    Active Problems:    KILO (acute kidney injury) ([address] (2/22/2018)      Acute cystitis without hematuria (2/22/2018)      Elevated troponin (2/22/2018)      Elevated brain natriuretic peptide (BNP) level (2/22/2018)      Acquired hypothyroidism (2/22/2018)      Dementia with behavioral disturbance (2/22/2018)      Supraventricular tachycardia ([address] (2/22/2018)      CODE STATUS: FULL CODE      PLAN/RECOMMENDATIONS:   · Admit to ICU  · Sepsis bundle. Needs more IV fluids. Agree with Zosyn/levofloxacin/vancomycin for empiric antibiotic therapy (to include coverage for urinary tract infection). · Check SvO2. If SvO2 is low, 2D echo in am. Should consider use of dobutamine if SvO2 is low.   · Nutrition: NPO for now  · Speech/swallow evaluation in am  · Glucose stabilizer per ICU protocol  · Replace electrolytes per ICU electrolyte replacement protocol  · HOB >=30 degree  · PT/OT eval and treat  · GI Prophylaxis with Protonix  · DVT Prophylaxis with heparin  · Further recommendations will be based on the patient's response to recommended treatment and results of the investigation ordered. · Malpositioned R IJ CVC noted on CXR; however, no need to attempt to reposition at this time. Repeat CXR in am.     The patient is: [x] acutely ill Risk of deterioration: [] moderate    [x] critically ill  [x] high     My assessment, plan of care, findings, medications, side effects, etc were discussed with:  [x] Nurse [] PT/OT    [x] Respiratory therapy [x] Dr. Silviano Moya   [] Family [x] Patient: answered all questions to satisfaction   [] Pharmacist []      [x] Case & management strategies discussed today on multidisciplinary rounds    [x] Total critical care time spent: 120 minutes, reviewing the case, medical record, data, notes, EMR, patient examination, documentation, coordinating care with nurse/consultants, exclusive of procedures (with complex decision making performed and > 50% time spent in face to face evaluation).       Nada Libman, MD  2/22/2018

## 2018-02-22 NOTE — ED NOTES
Verbal consent by DR. Ruba Song with patient for central line. Dr. Ruba Song at bedside for central line placement.

## 2018-02-22 NOTE — PROGRESS NOTES
Pharmacy Dosing Services: Vancomycin    Indication: Sepsis of unknown etiology    Day of therapy: 0    Other Antimicrobials (Include dose, start day & day of therapy):  Zosyn 4.5g (stated )      Loading dose (date given): 1000mg  Current Maintenance dose: New start    Goal Vancomycin Level: 15-20  (Trough 15-20 for most infections, 20 for meningitis/osteomyelitis, pre-HD level ~25)    Vancomycin Level (if drawn): None at this time     Significant Cultures:    blood- pending    Renal function stable? (unstable defined as SCr increase of 0.5 mg/dL or > 50% increase from baseline, whichever is greater) (Y/N): N     CAPD, Hemodialysis or Renal Replacement Therapy (Y/N): N     Recent Labs      18   1030   CREA  1.32*   BUN  29*   WBC  17.4*     Temp (24hrs), Av.5 °F (38.6 °C), Min:101.5 °F (38.6 °C), Max:101.5 °F (38.6 °C)    Creatinine Clearance (Creatinine Clearance (ml/min)): 18 ml/min     Regimen assessment: New start; load 1 g  Maintenance dose: Dose per random level  Next scheduled level:  at 1400       Pharmacy will follow daily and adjust medications as appropriate for renal function and/or serum levels.     Thank you,  Chase Tee

## 2018-02-22 NOTE — PROGRESS NOTES
In chart for upcoming admission. 1425: TRANSFER - IN REPORT:    Verbal report received from [facility] (name) on Mitcheal Binder  being received from ED (unit) for change in patient condition(sepsis on Levophed)      Report consisted of patients Situation, Background, Assessment and   Recommendations(SBAR). Information from the following report(s) SBAR, ED Summary, MAR, Recent Results, Cardiac Rhythm sinus tach and Alarm Parameters  was reviewed with the receiving nurse. Opportunity for questions and clarification was provided. Assessment completed upon patients arrival to unit and care assumed. Alerted by HR alarm, HR noted in 170s and sustained, Dr. Mavis Horvath and Dr. Dora Gomez at bedside. 12 lead EKG completed at this time, patient noted to be in SVT. Received orders from Dr. Mavis Horvath to give 1 Liter NS bolus now. 1620: felix catheter inserted at this time, draining cloudy yellow urine. Will continue to monitor. Received order to give 6 mg of adenosine now. 1713: 6 mg of adenosine given at this time   Charge nurse, Jennifer Dodd at bedside, patient nick to low of 37 and now HR sinus tach in low 100's ans sustained. Patient remains alert and oriented with no complaints. Will continue to monitor. Bedside and Verbal shift change report given to Keith Vela (oncoming nurse) by Nasra Arrieta RN   (offgoing nurse). Report included the following information SBAR, ED Summary, Intake/Output, MAR, Recent Results, Cardiac Rhythm sinus tach and Alarm Parameters .

## 2018-02-22 NOTE — ED PROVIDER NOTES
EMERGENCY DEPARTMENT HISTORY AND PHYSICAL EXAM    11:11 AM      Date: 2/22/2018  Patient Name: Luzmaria Celestin    History of Presenting Illness     Chief Complaint   Patient presents with    Fatigue         History Provided By: Patient and EMS; hx limited by absence of caregiver and pt's condition    Chief Complaint: Weakness  Duration:  unknown  Timing:  Acute and Progressive  Location: generalized  Quality: N/A  Severity: Moderate  Modifying Factors: N/A  Associated Symptoms: denies any other associated signs or symptoms      Additional History (Context): Luzmaria Celestin is a 80 y.o. female with hypothyroidism who presents    per EMS with fatigue weakness for unknown length of time. She comes from nursing home; pt's nursing home records reviewed, fever in ED, pt has no c/o and states she feels well. Pt denies CP, abd pain. Poor historian.         PCP: Ly Schwarz MD    Current Facility-Administered Medications   Medication Dose Route Frequency Provider Last Rate Last Dose    0.9% sodium chloride infusion  150 mL/hr IntraVENous CONTINUOUS Delvin Valdes  mL/hr at 02/22/18 1136 150 mL/hr at 02/22/18 1136    sodium chloride (NS) flush 5-10 mL  5-10 mL IntraVENous PRN Delvin Valdes MD        vancomycin (VANCOCIN) 1000 mg in  ml infusion  1,000 mg IntraVENous ONCE Delvin Valdes  mL/hr at 02/22/18 1249 1,000 mg at 02/22/18 1249    piperacillin-tazobactam (ZOSYN) 4.5 g in 0.9% sodium chloride (MBP/ADV) 100 mL MBP  4.5 g IntraVENous Q6H Delvin Valdes MD   Stopped at 02/22/18 1137    [START ON 2/24/2018] levoFLOXacin (LEVAQUIN) 500 mg in D5W IVPB  500 mg IntraVENous Q48H Delvin Valdes MD       [facility], Pharmacy dosing per level  1 Each Other Rx Dosing/Monitoring Delvin Valdes MD        [START ON 2/23/2018] VANCOMYCIN INFORMATION NOTE   Other ONCE Delvin Valdes MD        NOREPINephrine (LEVOPHED) 8 mg in 5% dextrose 250mL infusion  2-16 mcg/min IntraVENous TITRATE Steve Hilaria Feliciano MD 15 mL/hr at 02/22/18 1247 8 mcg/min at 02/22/18 1247    aspirin (ASA) suppository 300 mg  300 mg Rectal NOW Jillian White MD         Current Outpatient Prescriptions   Medication Sig Dispense Refill    aspirin delayed-release 81 mg tablet Take 81 mg by mouth daily.  atorvastatin (LIPITOR) 40 mg tablet Take 40 mg by mouth daily.  levothyroxine (SYNTHROID) 75 mcg tablet Take 75 mcg by mouth Daily (before breakfast).  MAGNESIUM OXIDE, BULK, Take 400 mg by mouth daily.  polyethylene glycol (MIRALAX) 17 gram packet Take 17 g by mouth daily.  MULTIVIT-MINERALS/FERROUS FUM (MULTI VITAMIN PO) Take 1 Tab by mouth daily.  memantine (NAMENDA) 10 mg tablet Take 14 mg by mouth daily.  [START ON 2/23/2018] memantine (NAMENDA) 10 mg tablet Take 21 mg by mouth daily.  [START ON 3/9/2018] memantine (NAMENDA) 10 mg tablet Take 28 mg by mouth daily.  calcium-vitamin D (OYSTER SHELL) 500 mg(1,250mg) -200 unit per tablet Take 1 Tab by mouth daily.  mirtazapine (REMERON) 7.5 mg tablet Take 7.5 mg by mouth nightly. Past History     Past Medical History:  History reviewed. No pertinent past medical history. Past Surgical History:  History reviewed. No pertinent surgical history. Family History:  History reviewed. No pertinent family history. Social History:  Social History   Substance Use Topics    Smoking status: None    Smokeless tobacco: None    Alcohol use None       Allergies:  No Known Allergies      Review of Systems       Review of Systems   Constitutional: Positive for fatigue. Negative for activity change and fever. HENT: Negative for congestion and rhinorrhea. Eyes: Negative for visual disturbance. Respiratory: Negative for shortness of breath. Cardiovascular: Negative for chest pain and palpitations. Gastrointestinal: Negative for abdominal pain, diarrhea, nausea and vomiting. Genitourinary: Negative for dysuria and hematuria. Musculoskeletal: Negative for back pain. Skin: Negative for rash. Neurological: Positive for weakness. Negative for dizziness and light-headedness. Psychiatric/Behavioral: Negative for agitation. All other systems reviewed and are negative. LIMITED BY: poor historian and acuity of condition    Physical Exam     Visit Vitals    /58    Pulse (!) 115    Temp (!) 101.5 °F (38.6 °C)    Resp 29    Ht 5' 5\" (1.651 m)    Wt 41.3 kg (91 lb)    SpO2 100%    BMI 15.14 kg/m2         Physical Exam   Constitutional: She appears well-developed and well-nourished. No distress. HENT:   Head: Normocephalic and atraumatic. Right Ear: External ear normal.   Left Ear: External ear normal.   Nose: Nose normal.   Mouth/Throat: Oropharynx is clear and moist. Mucous membranes are dry. Eyes: Conjunctivae and EOM are normal. Pupils are equal, round, and reactive to light. No scleral icterus. Neck: Normal range of motion. Neck supple. No JVD present. No tracheal deviation present. No thyromegaly present. Cardiovascular: Regular rhythm. Tachycardia present. Exam reveals no friction rub. No murmur heard. Pulmonary/Chest: Effort normal and breath sounds normal. No stridor. She exhibits no tenderness. Abdominal: Soft. Bowel sounds are normal. She exhibits no distension. There is no tenderness. There is no rebound and no guarding. Musculoskeletal: Normal range of motion. She exhibits no edema or tenderness. Lymphadenopathy:     She has no cervical adenopathy. Neurological: She is alert. No cranial nerve deficit. Coordination normal.   Awake alert oriented to self   Skin: Skin is warm and dry. bruising B/L LE   stage 1 sacral decubitus ulcer    Psychiatric: She has a normal mood and affect. Her behavior is normal. Judgment and thought content normal.   Nursing note and vitals reviewed.         Diagnostic Study Results     Labs -  Recent Results (from the past 12 hour(s))   CARDIAC PANEL,(CK, CKMB & TROPONIN)    Collection Time: 02/22/18 10:30 AM   Result Value Ref Range    CK 47 26 - 192 U/L    CK - MB <1.0 <3.6 ng/ml    CK-MB Index  0.0 - 4.0 %     CALCULATION NOT PERFORMED WHEN RESULT IS BELOW LINEAR LIMIT    Troponin-I, Qt. 0.73 (H) 0.0 - 0.045 NG/ML   CBC W/O DIFF    Collection Time: 02/22/18 10:30 AM   Result Value Ref Range    WBC 17.4 (H) 4.6 - 13.2 K/uL    RBC 3.91 (L) 4.20 - 5.30 M/uL    HGB 11.0 (L) 12.0 - 16.0 g/dL    HCT 34.2 (L) 35.0 - 45.0 %    MCV 87.5 74.0 - 97.0 FL    MCH 28.1 24.0 - 34.0 PG    MCHC 32.2 31.0 - 37.0 g/dL    RDW 16.1 (H) 11.6 - 14.5 %    PLATELET 715 588 - 877 K/uL    MPV 11.8 9.2 - 65.8 FL   METABOLIC PANEL, COMPREHENSIVE    Collection Time: 02/22/18 10:30 AM   Result Value Ref Range    Sodium 142 136 - 145 mmol/L    Potassium 4.3 3.5 - 5.5 mmol/L    Chloride 106 100 - 108 mmol/L    CO2 26 21 - 32 mmol/L    Anion gap 10 3.0 - 18 mmol/L    Glucose 116 (H) 74 - 99 mg/dL    BUN 29 (H) 7.0 - 18 MG/DL    Creatinine 1.32 (H) 0.6 - 1.3 MG/DL    BUN/Creatinine ratio 22 (H) 12 - 20      GFR est AA 46 (L) >60 ml/min/1.73m2    GFR est non-AA 38 (L) >60 ml/min/1.73m2    Calcium 7.9 (L) 8.5 - 10.1 MG/DL    Bilirubin, total 0.6 0.2 - 1.0 MG/DL    ALT (SGPT) 17 13 - 56 U/L    AST (SGOT) 26 15 - 37 U/L    Alk.  phosphatase 130 (H) 45 - 117 U/L    Protein, total 6.4 6.4 - 8.2 g/dL    Albumin 2.0 (L) 3.4 - 5.0 g/dL    Globulin 4.4 (H) 2.0 - 4.0 g/dL    A-G Ratio 0.5 (L) 0.8 - 1.7     NT-PRO BNP    Collection Time: 02/22/18 10:30 AM   Result Value Ref Range    NT pro-BNP >79466 (H) 0 - 1800 PG/ML   TSH 3RD GENERATION    Collection Time: 02/22/18 10:30 AM   Result Value Ref Range    TSH 5.37 (H) 0.36 - 3.74 uIU/mL   T4, FREE    Collection Time: 02/22/18 10:30 AM   Result Value Ref Range    T4, Free 0.9 0.7 - 1.5 NG/DL   CULTURE, BLOOD    Collection Time: 02/22/18 10:30 AM   Result Value Ref Range    Special Requests: PERIPHERAL      Culture result: PENDING    CULTURE, BLOOD    Collection Time: 02/22/18 10:48 AM   Result Value Ref Range    Special Requests: PERIPHERAL      Culture result: PENDING    POC LACTIC ACID    Collection Time: 02/22/18 10:50 AM   Result Value Ref Range    Lactic Acid (POC) 3.8 (HH) 0.4 - 2.0 mmol/L   EKG, 12 LEAD, INITIAL    Collection Time: 02/22/18 11:02 AM   Result Value Ref Range    Ventricular Rate 105 BPM    Atrial Rate 105 BPM    P-R Interval 138 ms    QRS Duration 88 ms    Q-T Interval 352 ms    QTC Calculation (Bezet) 465 ms    Calculated P Axis 76 degrees    Calculated R Axis -60 degrees    Calculated T Axis 81 degrees    Diagnosis       Sinus tachycardia  Left axis deviation  Inferior infarct , age undetermined  Anteroseptal infarct , age undetermined  Abnormal ECG  No previous ECGs available     URINALYSIS W/ RFLX MICROSCOPIC    Collection Time: 02/22/18 11:12 AM   Result Value Ref Range    Color DARK YELLOW      Appearance CLEAR      Specific gravity 1.027 1.005 - 1.030      pH (UA) 5.0 5.0 - 8.0      Protein TRACE (A) NEG mg/dL    Glucose NEGATIVE  NEG mg/dL    Ketone TRACE (A) NEG mg/dL    Bilirubin SMALL (A) NEG      Blood NEGATIVE  NEG      Urobilinogen 1.0 0.2 - 1.0 EU/dL    Nitrites NEGATIVE  NEG      Leukocyte Esterase TRACE (A) NEG     URINE MICROSCOPIC ONLY    Collection Time: 02/22/18 11:12 AM   Result Value Ref Range    WBC 0 to 2 0 - 4 /hpf    RBC 0 to 1 0 - 5 /hpf    Epithelial cells FEW 0 - 5 /lpf    Bacteria FEW (A) NEG /hpf   INFLUENZA A & B AG (RAPID TEST)    Collection Time: 02/22/18 11:27 AM   Result Value Ref Range    Influenza A Antigen NEGATIVE  NEG      Influenza B Antigen NEGATIVE  NEG     EKG, 12 LEAD, SUBSEQUENT    Collection Time: 02/22/18 11:51 AM   Result Value Ref Range    Ventricular Rate 96 BPM    Atrial Rate 96 BPM    P-R Interval 152 ms    QRS Duration 80 ms    Q-T Interval 424 ms    QTC Calculation (Bezet) 535 ms    Calculated P Axis 75 degrees    Calculated R Axis -59 degrees    Calculated T Axis 63 degrees    Diagnosis       Sinus rhythm with premature atrial complexes  Left anterior fascicular block  Cannot rule out Inferior infarct (cited on or before 22-FEB-2018)  Anteroseptal infarct (cited on or before 22-FEB-2018)  Abnormal ECG  When compared with ECG of 22-FEB-2018 11:02,  premature atrial complexes are now present  QT has lengthened         Radiologic Studies -   XR CHEST SNGL V   Final Result   As read by RAD:    IMPRESSION:     Minimal blunting costophrenic angles probably due to small effusions.                Medical Decision Making   I am the first provider for this patient. I reviewed the vital signs, available nursing notes, past medical history, past surgical history, family history and social history. Vital Signs-Reviewed the patient's vital signs. Pulse Oximetry Analysis -  96% on room air (Interpretation) nonhypoxic    Cardiac Monitor:  Rate: 104    EKG: Interpreted by the EP. Time Interpreted: 1102   Rate: 105   Rhythm: Sinus Tachycardia    Interpretation: L axis deviation, otherwise nml intervals poor R wave with infarct in inferior and anterior wall    Comparison: no older EKGs available    Repeat at 11:57 continues to be HR 96, no sign of STEMI      Records Reviewed: Nursing Notes (Time of Review: 11:11 AM)    ED Course: Progress Notes, Reevaluation, and Consults:    11:05 Need sepsis focal re-eval and lactic    11:45 elevated troponin, abnml EKG but no STEMI; I will repeat. Pt with elevated WBC, CXR with left pleural effusion, no old for comparison. Sepsis Abx given, pt will be admitted r/o for MI, treated for presumed infection. Although pt has elevated BNP, lungs are clear, CXR without any significant fluid overload and pt is no respiratory distress. 11:50 Consult:  Discussed care with Dr. Merari Ponce (Hospitalist). Standard discussion; including history of patients chief complaint, available diagnostic results, and treatment course. Will admit the pt.         Provider Notes (Medical Decision Making):     Pt with generalized weakness, fever, elevated lactate, tachycardia, sepsis protocol initiated. Fluids and antibiotics given. Concern for pneumonia, influenza, UTI. I will check TSH, F T4, urine and blood culture pending  Multiple discussions with Dr. Moisés Washington. Aware of pulling the central line back 4 cm as pt in ICU when radiologist called. Core Measures:     Sepsis protocol started at 1039    11:52 AM - I suspect that this patient has an active infection. 11:52 AM - The patient met criteria for severe sepsis at this time. PROVIDER SEPSIS PHYSICAL EXAM EVAL  Vital signs reviewed (see nursing documentation for further details):  Vitals:    02/22/18 1250 02/22/18 1252 02/22/18 1255 02/22/18 1256   BP: (!) 49/28 (!) 70/53 110/58    Pulse: (!) 118 (!) 118  (!) 115   Resp: (!) 35 (!) 32  29   Temp:       SpO2: (!) 83% 100%  100%       Cardiac exam:Regular Rate    Pulmonary exam:Normal    Peripheral pulses:Normal    Capillary refill:Normal    Skin exam:pink    Exam performed Irene Lobo MD        Procedures -     Central Line  Date/Time: 2/22/2018 12:53 PM  Performed by: Graciela Hollins  Authorized by: Graciela Hollins     Consent:     Consent obtained:  Verbal (written will be obtained)    Consent given by:  Patient  Pre-procedure details:     Hand hygiene: Hand hygiene performed prior to insertion      Sterile barrier technique: All elements of maximal sterile technique followed      Skin preparation:  ChloraPrep    Skin preparation agent: Skin preparation agent completely dried prior to procedure    Anesthesia (see MAR for exact dosages):      Anesthesia method:  Local infiltration    Local anesthetic:  Lidocaine 1% w/o epi  Procedure details:     Location:  R internal jugular    Patient position:  Trendelenburg    Procedural supplies:  Triple lumen    Catheter size:  7 Fr    Landmarks identified: yes      Ultrasound guidance: yes      Sterile ultrasound techniques: Sterile gel and sterile probe covers were used      Number of attempts:  1    Successful placement: yes    Post-procedure details:     Post-procedure:  Line sutured (Biopatch applied)    Assessment:  Placement verified by x-ray and blood return through all ports    Patient tolerance of procedure: Tolerated well, no immediate complications          Critical Care Time:     CRITICAL CARE:  11:52  I have spent 30 minutes of critical care time involved in lab review, consultations with specialist, family decision-making, and documentation. During this entire length of time I was immediately available to the patient. Critical Care: The reason for providing this level of medical care for this critically ill patient was due a critical illness that impaired one or more vital organ systems such that there was a high probability of imminent or life threatening deterioration in the patients condition. This care involved high complexity decision making to assess, manipulate, and support vital system functions, to treat this degreee vital organ system failure and to prevent further life threatening deterioration of the patients condition. For Hospitalized Patients:    1. Hospitalization Decision Time:  The decision to hospitalize the patient was made by Dr. Josef Guajardo at 36 on 2/22/2018    2. Aspirin: Aspirin was not given because the patient did not present with a stroke at the time of their Emergency Department evaluation    Diagnosis     Clinical Impression:   1. Fever, unspecified fever cause    2. Generalized weakness    3. Elevated serum lactate dehydrogenase    4. Pleural effusion, left    5. Leukocytosis, unspecified type    6. Abnormal EKG    7. Elevated troponin        Disposition: Admit      Patient's Medications   Start Taking    No medications on file   Continue Taking    ASPIRIN DELAYED-RELEASE 81 MG TABLET    Take 81 mg by mouth daily. ATORVASTATIN (LIPITOR) 40 MG TABLET    Take 40 mg by mouth daily.     CALCIUM-VITAMIN D (OYSTER SHELL) 500 MG(1,250MG) -200 UNIT PER TABLET    Take 1 Tab by mouth daily. LEVOTHYROXINE (SYNTHROID) 75 MCG TABLET    Take 75 mcg by mouth Daily (before breakfast). MAGNESIUM OXIDE, BULK,    Take 400 mg by mouth daily. MEMANTINE (NAMENDA) 10 MG TABLET    Take 14 mg by mouth daily. MEMANTINE (NAMENDA) 10 MG TABLET    Take 21 mg by mouth daily. MEMANTINE (NAMENDA) 10 MG TABLET    Take 28 mg by mouth daily. MIRTAZAPINE (REMERON) 7.5 MG TABLET    Take 7.5 mg by mouth nightly. MULTIVIT-MINERALS/FERROUS FUM (MULTI VITAMIN PO)    Take 1 Tab by mouth daily. POLYETHYLENE GLYCOL (MIRALAX) 17 GRAM PACKET    Take 17 g by mouth daily. These Medications have changed    No medications on file   Stop Taking    No medications on file     _______________________________    Attestations:  [address] [place] [address] acting as a scribe for and in the presence of Mary Jane Toledo MD      February 22, 2018 at 1:20 PM       Provider Attestation:      I personally performed the services described in the documentation, reviewed the documentation, as recorded by the scribe in my presence, and it accurately and completely records my words and actions.  February 22, 2018 at 1:20 PM - Mary Jane Toledo MD    _______________________________

## 2018-02-23 PROBLEM — I82.412 ACUTE DEEP VEIN THROMBOSIS (DVT) OF FEMORAL VEIN OF LEFT LOWER EXTREMITY (HCC): Status: ACTIVE | Noted: 2018-01-01

## 2018-02-23 NOTE — CONSULTS
[facility] - Consult Note    Consultation request by Dr. Ab Ritter for advice/opinion related to evaluating abnormal echo    Date of  Admission: 2/22/2018 10:33 AM   Primary Care Physician:  Severiano Orf, MD  Patient seen and examined independently. Patient oriented to \"hospital', doesn't know why she is here, doesn't know year, has bonded with \"cupcake\" her stuffed animal ( cat ). Cardiac biomarkers do not support notion of an acute event. Pattern of cardiomyopathy either stress cardiomyopathy or ischemic cardiomyopathy. In my opinion, she would not be a candidate for invasive evaluation. Would treat medically. Agree with assessment and plan as noted below. Darwin Benavidez MD   Assessment:     -Septic shock, WBC 18.1, on Levophed, unclear source  -Cardiomyopathy, likely stress-induced, unlikely associated with ACS given flat indeterminate troponin 0.73>0.62>0.76  -Echo 02/22/18: EF 45-50%, hypokinesis of basal-mid anteroseptal, mid inferoseptal, apical inferior and mid anterolateral walls. Akinesis of the entire anterior, apical septal, apical lateral and apical walls. Mildly to moderately increased LV wall thickness. Grade 1 diastolic dysfunction. Reduced RV systolic function. Mild mitral regurgitation. Mild tricuspid regurgitation. Dilated IVC. -Moderate to severe aortic stenosis by echo 02/2018, valve mean gradient 36 mmHg. -KILO  -Dyslipidemia  -Hypothyroidism  -Hx UTI     Plan:     Pt is an 81 yo F with dementia who presented from NH due to decreased activity and loose stools. Treatment was initiated for septic shock, and cardiology was consulted due to wall motion abnormalities seen on echo, along with indeterminate troponin ~0.7. Echo and troponin findings are more likely related to stress cardiomyopathy, less likely due to acute ischemic event. Would start ACEi and BB once off pressors and able to hemodynamically tolerate. History of Present Illness: This is a 80 y.o. female admitted for Sepsis associated hypotension ([facility] [address] Patient complains of:  Fatigue/weakness    Pt is an 79 yo F who presented to the ED from nursing home with decreased activity x 6 days. Per EMS report, 911 was called because she developed \"loose stool\" as well. Pt with Hx dementia, thus history is limited. Pt noted to be hypotensive and febrile in the ED, thus treatment was initiated for septic shock. Her echo revealed WMA, thus cardiology was consulted. Pt is unable to say why she came to the hospital and states, \"I woke up and was here. \"  She denies any c/o pain or shortness of breath. Cardiac risk factors: post-menopausal      Review of Symptoms:     Review of systems not obtained due to patient factors. Pt with dementia. Past Medical History:   History reviewed. No pertinent past medical history.       Social History:     Social History     Social History    Marital status:      Spouse name: N/A    Number of children: N/A    Years of education: N/A     Social History Main Topics    Smoking status: None    Smokeless tobacco: None    Alcohol use None    Drug use: None    Sexual activity: Not Asked     Other Topics Concern    None     Social History Narrative    None        Family History:     Family History   Problem Relation Age of Onset    Family history unknown: Yes        Medications:   No Known Allergies     Current Facility-Administered Medications   Medication Dose Route Frequency    piperacillin-tazobactam (ZOSYN) 4.5 g in 0.9% sodium chloride (MBP/ADV) 100 mL MBP ###EXTENDED 4 HOUR INFUSION###  4.5 g IntraVENous Q8H    vancomycin (VANCOCIN) 1,000 mg in 0.9% sodium chloride (MBP/ADV) 250 mL  1,000 mg IntraVENous ONCE    [START ON 2/24/2018] vancomycin (VANCOCIN) 500 mg in 0.9% sodium chloride (MBP/ADV) 100 mL  500 mg IntraVENous Q24H    [START ON 2/26/2018] VANCOMYCIN INFORMATION NOTE   Other ONCE    VANCOMYCIN INFORMATION NOTE 1 Each  1 Each Other Rx Dosing/Monitoring    sodium chloride (NS) flush 5-10 mL  5-10 mL IntraVENous PRN    [START ON 2/24/2018] levoFLOXacin (LEVAQUIN) 500 mg in D5W IVPB  500 mg IntraVENous Q48H    NOREPINephrine (LEVOPHED) 8 mg in 5% dextrose 250mL infusion  2-16 mcg/min IntraVENous TITRATE    aspirin delayed-release tablet 81 mg  81 mg Oral DAILY    atorvastatin (LIPITOR) tablet 40 mg  40 mg Oral DAILY    sodium chloride (NS) flush 5-10 mL  5-10 mL IntraVENous Q8H    sodium chloride (NS) flush 5-10 mL  5-10 mL IntraVENous PRN    0.9% sodium chloride infusion  150 mL/hr IntraVENous CONTINUOUS    acetaminophen (TYLENOL) tablet 650 mg  650 mg Oral Q6H PRN    heparin (porcine) injection 5,000 Units  5,000 Units SubCUTAneous Q8H    [START ON 2/26/2018] levothyroxine (SYNTHROID) injection 37.5 mcg  37.5 mcg IntraVENous Q24H    pantoprazole (PROTONIX) 40 mg in sodium chloride 10 mL injection  40 mg IntraVENous DAILY         Physical Exam:     Visit Vitals    /58    Pulse 79    Temp 97.8 °F (36.6 °C)    Resp 23    Ht 5' 5\" (1.651 m)    Wt 111 lb 5.3 oz (50.5 kg)    SpO2 100%    Breastfeeding No    BMI 18.53 kg/m2     BP Readings from Last 3 Encounters:   02/23/18 101/58   02/21/18 116/63   02/20/18 103/60     Pulse Readings from Last 3 Encounters:   02/23/18 79   02/21/18 (!) 102   02/20/18 88     Wt Readings from Last 3 Encounters:   02/22/18 111 lb 5.3 oz (50.5 kg)       General:  alert, cooperative, no distress, appears stated age  Neck:  No JVD  Lungs:  clear to auscultation bilaterally  Heart:  Regular rate and rhythm, 6-9/9 systolic murmur  Abdomen:  abdomen is soft without significant tenderness, masses, organomegaly or guarding  Extremities:  no edema  Skin: Warm and dry.    Neuro: alert, oriented to place (\"hospital\"), able to say year of birth but not current year, no involuntary movements  Psych: non focal     Data Review:     Recent Labs      02/23/18   0300  02/22/18   1030   WBC  18.1*  17.4*   HGB  9.4* 11.0*   HCT  29.6*  34.2*   PLT  246  261     Recent Labs      02/23/18   0300  02/22/18   1638  02/22/18   1030   NA  146*   --   142   K  3.7   --   4.3   CL  115*   --   106   CO2  18*   --   26   GLU  114*   --   116*   BUN  26*   --   29*   CREA  1.06   --   1.32*   CA  7.5*   --   7.9*   MG   --   2.4   --    PHOS   --   3.6   --    ALB  1.5*   --   2.0*   SGOT  24   --   26   ALT  13   --   17       Results for orders placed or performed during the hospital encounter of 02/22/18   EKG, 12 LEAD, INITIAL   Result Value Ref Range    Ventricular Rate 105 BPM    Atrial Rate 105 BPM    P-R Interval 138 ms    QRS Duration 88 ms    Q-T Interval 352 ms    QTC Calculation (Bezet) 465 ms    Calculated P Axis 76 degrees    Calculated R Axis -60 degrees    Calculated T Axis 81 degrees    Diagnosis       Sinus tachycardia  Left axis deviation  Inferior infarct , age undetermined  Anteroseptal infarct , age undetermined  Abnormal ECG  No previous ECGs available  Confirmed by Leona Soto ([phone]) on 2/22/2018 8:40:21 PM         All Cardiac Markers in the last 24 hours:    Lab Results   Component Value Date/Time    CPK 89 02/22/2018 10:30 PM    CPK 57 02/22/2018 04:38 PM    CKMB 1.6 02/22/2018 10:30 PM    CKMB 1.4 02/22/2018 04:38 PM    CKND1 1.8 02/22/2018 10:30 PM    CKND1 2.5 02/22/2018 04:38 PM    TROIQ 0.76 (H) 02/22/2018 10:30 PM    TROIQ 0.62 (H) 02/22/2018 04:38 PM       Last Lipid:  No results found for: CHOL, CHOLX, CHLST, CHOLV, HDL, LDL, LDLC, DLDLP, TGLX, TRIGL, TRIGP, CHHD, CHHDX    Signed By: Rehana Vasques PA-C     February 23, 2018

## 2018-02-23 NOTE — PROGRESS NOTES
Bedside and Verbal shift change report given to Charles Wilson (oncoming nurse) by Tal Kimbrough RN (offgoing nurse). Report included the following information Kardex, Intake/Output, MAR and Recent Results.

## 2018-02-23 NOTE — PROGRESS NOTES
Pharmacy Dosing Services: Vancomycin    Indication: Sepsis of unknown etiology    Day of therapy: 1    Other Antimicrobials (Include dose, start day & day of therapy):  Zosyn 4.5g (stated )      Loading dose (date given): 1000mg  Current Maintenance dose: dose per level    Goal Vancomycin Level: 15-20  (Trough 15-20 for most infections, 20 for meningitis/osteomyelitis, pre-HD level ~25)    Vancomycin Level (if drawn):    - 6.2 (21hr post dose)    Significant Cultures:    blood- pending   urine - pending    Renal function stable? (unstable defined as SCr increase of 0.5 mg/dL or > 50% increase from baseline, whichever is greater) (Y/N): N     CAPD, Hemodialysis or Renal Replacement Therapy (Y/N): N     Recent Labs      18   0300  18   1030   CREA  1.06  1.32*   BUN  26*  29*   WBC  18.1*  17.4*     Temp (24hrs), Av.8 °F (37.1 °C), Min:97.6 °F (36.4 °C), Max:101.4 °F (38.6 °C)    Creatinine Clearance (Creatinine Clearance (ml/min)): 28 ml/min     Regimen assessment: KILO improvement; random level subtherapeutic  Re-load with 1000 mg and schedule a 24 hour dosing    Maintenance dose: 500mg IV q24h  Next scheduled level:  prior to 1300 dose       Pharmacy will follow daily and adjust medications as appropriate for renal function and/or serum levels.     Thank you,  Yariel Luna

## 2018-02-23 NOTE — PROGRESS NOTES
2000 Assumed care of the pt, assessments completed and charted. Resting quietly in the bed, Titrating Levophed for desired MAP. All other VSS, no distress noted or reported. 2230 TRANSFER - OUT REPORT:    Verbal report given to Kaden Prema Velazquez RN(name) on Emerald Marcos  being transferred to [facility](unit) for urgent transfer       Report consisted of patients Situation, Background, Assessment and   Recommendations(SBAR). Information from the following report(s) SBAR, Kardex, Intake/Output, MAR, Recent Results and Cardiac Rhythm NSR was reviewed with the receiving nurse. Lines:   Triple Lumen 02/22/18 Right Internal jugular (Active)   Central Line Being Utilized Yes 2/22/2018 11:01 PM   Criteria for Appropriate Use Hemodynamically unstable, requiring monitoring lines, vasopressors, or volume resuscitation 2/22/2018 11:01 PM   Site Assessment Clean, dry, & intact 2/22/2018 11:01 PM   Infiltration Assessment 0 2/22/2018 11:01 PM   Affected Extremity/Extremities Color distal to insertion site pink (or appropriate for race) 2/22/2018 11:01 PM   Date of Last Dressing Change 02/22/18 2/22/2018  4:00 PM   Dressing Status Clean, dry, & intact 2/22/2018 11:01 PM   Dressing Type Disk with Chlorhexadine gluconate (CHG) 2/22/2018 11:01 PM   Action Taken Open ports on tubing capped 2/22/2018 11:01 PM   Proximal Hub Color/Line Status White; Infusing 2/22/2018 11:01 PM   Positive Blood Return (Medial Site) Yes 2/22/2018 11:01 PM   Medial Hub Color/Line Status Blue; Infusing 2/22/2018 11:01 PM   Positive Blood Return (Lateral Site) Yes 2/22/2018 11:01 PM   Distal Hub Color/Line Status Trinna Gathers; Infusing 2/22/2018 11:01 PM   Positive Blood Return (Site #3) Yes 2/22/2018 11:01 PM   Alcohol Cap Used Yes 2/22/2018 11:01 PM       Peripheral IV 02/22/18 Left Antecubital (Active)   Site Assessment Clean, dry, & intact 2/22/2018 11:01 PM   Phlebitis Assessment 0 2/22/2018 11:01 PM   Infiltration Assessment 0 2/22/2018 11:01 PM   Dressing Status Clean, dry, & intact 2/22/2018 11:01 PM   Dressing Type Transparent;Tape 2/22/2018  8:00 PM   Hub Color/Line Status Pink;Flushed;Capped 2/22/2018  8:00 PM   Action Taken Open ports on tubing capped 2/22/2018  8:00 PM   Alcohol Cap Used Yes 2/22/2018  8:00 PM       Peripheral IV 02/22/18 Right Arm (Active)   Site Assessment Clean, dry, & intact 2/22/2018 11:01 PM   Phlebitis Assessment 0 2/22/2018 11:01 PM   Infiltration Assessment 0 2/22/2018 11:01 PM   Dressing Status Clean, dry, & intact 2/22/2018 11:01 PM   Dressing Type Transparent;Tape 2/22/2018  8:00 PM   Hub Color/Line Status Pink;Flushed;Capped 2/22/2018  8:00 PM   Action Taken Open ports on tubing capped 2/22/2018  8:00 PM   Alcohol Cap Used Yes 2/22/2018  8:00 PM        Opportunity for questions and clarification was provided.       Patient transported with:   Monitor  O2 @ 4 liters  Patient-specific medications from Pharmacy  Registered Nurse

## 2018-02-23 NOTE — CDMP QUERY
Patient is noted to have a BMI of  18.53 ,   Please clarify if this patient is:     =>Underweight  =>Cachexia  =>Failure to Thrive  =>Other explanation of clinical findings  =>Unable to determine (no explanation for clinical findings)    Presentation: BMI 18.53     Risk: age  80, resident of NH     Clinical Indicators:  bmi 18.53     Treatment:  ivf, diet consult      Thank you,  Mike Valenzuela RN/CCDS  [phone]

## 2018-02-23 NOTE — PROGRESS NOTES
7937  Assumed care of pt. Sleeping, VSS per monitor   1500  PVL LLE done, reveals DVT from ankle to groin. [phone]  Dr. Sylvia Wilson informed, to start heparin infusion without initial bolus. 1915  Bedside and Verbal shift change report given to Shawanda Agrawal   (oncoming nurse) by Rivas García RN (offgoing nurse). Report included the following information SBAR, Kardex, Intake/Output, MAR, Accordion, Recent Results and Cardiac Rhythm SR with rare pvc.

## 2018-02-23 NOTE — PROGRESS NOTES
Problem: Falls - Risk of  Goal: *Absence of Falls  Document Bahman Fall Risk and appropriate interventions in the flowsheet.    Outcome: Progressing Towards Goal  Fall Risk Interventions:  Mobility Interventions: Bed/chair exit alarm, PT Consult for mobility concerns    Mentation Interventions: Bed/chair exit alarm    Medication Interventions: Bed/chair exit alarm    Elimination Interventions: Call light in reach

## 2018-02-23 NOTE — PROGRESS NOTES
Patient has designated her duaghter to participate in his/her discharge plan and to receive any needed information.      Name:   Mirna Jin  daughter     /       Feb 23, 2018     Address:  Phone number:

## 2018-02-23 NOTE — CONSULTS
INFECTIOUS DISEASE CONSULT NOTE       Requested by: Dr Merari Ponce    Reason for consult: Fever of unclear source    Date of admission: 2/22/2018    Date of consult: February 23, 2018      ABX:     Current abx Prior abx    Vanco/Zosyn/levaquin 2/22-1      ASSESSMENT: -- RECOMMENDATIONS      Shock- ? Sepsis- presented with fever, AMS and hypotension  - elevated cr and WBC but normal LA, neg UA and CXr for infiltrates - Source unclear but RLQ tenderness ? UTI ? Appendix  - still on pressors and remains with elevated WBC  - will get Influenza swab  - continue current triple Abx  - Monitor cx  - d/w Dr Merari Ponce   Abd pain- RLQ  - ? UTI/Pyelo vs appendix - Will get US and low threahold for CT   KILO on amdission  - suspect sec to dehydration, hypotension - Improving  - avoid nephrotoxic meds and dose Abx and other meds for current CrCl   CHF- pt with elevated BNP and mildly elevated trops  - Cxr with congestion  - Echo with EF 87-93% with diastolic dysfunction - ? Recent cardiac event as echo with akinesis    Leg edema- left > right  - pt with no calf tenderness ? sec to gen edema sec to CHF - Will order stat PVL of LE   SVT     Malpositioned Rt IJ line - Mx per others   Dementia     Hypothyroid- TSH of 5.37 and fT4 of 0.9      We are available over weekend and plan to f/u Monday. Dr Elle Miller is on call and can be reached at [phone] if any problem or question for ID prior. MICROBIOLOGY:   2/22 Blcx x2 IP   ucx IP    LINES/DRAINS:     Rt IJ line 2/22    HPI:     Ms White is a 80 y.o. W female with h/o hypothyroidism who presented to the ED from nursing home 2/22 for weakness and fatigue. Pt is unable to provide any meaningful history. Per review of her chart and d/w other physicians and staff she was febrile on arrival to ED. Her temp was 101.5 and she was hypotensive. She was given IVF and required pressors and transferred to ICU.  Her WBC was elevated at 17k and had elevated Cr of 1.32. Her proBNP was sig elevated and CXR was s/o congestion. She was started on BSAbx and we were asked for further eval and management. Past medical history:   History reviewed. No pertinent past medical history. Social History:     Social History     Social History    Marital status:      Spouse name: N/A    Number of children: N/A    Years of education: N/A     Occupational History    Not on file. Social History Main Topics    Smoking status: Not on file    Smokeless tobacco: Not on file    Alcohol use Not on file    Drug use: Not on file    Sexual activity: Not on file     Other Topics Concern    Not on file     Social History Narrative    No narrative on file       Family History:     Family History   Problem Relation Age of Onset    Family history unknown: Yes       Allergies:   No Known Allergies      Home Medications:     Prescriptions Prior to Admission   Medication Sig    aspirin delayed-release 81 mg tablet Take 81 mg by mouth daily.  atorvastatin (LIPITOR) 40 mg tablet Take 40 mg by mouth daily.  levothyroxine (SYNTHROID) 75 mcg tablet Take 75 mcg by mouth Daily (before breakfast).  MAGNESIUM OXIDE, BULK, Take 400 mg by mouth daily.  polyethylene glycol (MIRALAX) 17 gram packet Take 17 g by mouth daily.  MULTIVIT-MINERALS/FERROUS FUM (MULTI VITAMIN PO) Take 1 Tab by mouth daily.  [] memantine (NAMENDA) 10 mg tablet Take 14 mg by mouth daily.  memantine (NAMENDA) 10 mg tablet Take 21 mg by mouth daily.  [START ON 3/9/2018] memantine (NAMENDA) 10 mg tablet Take 28 mg by mouth daily.  calcium-vitamin D (OYSTER SHELL) 500 mg(1,250mg) -200 unit per tablet Take 1 Tab by mouth daily.  mirtazapine (REMERON) 7.5 mg tablet Take 7.5 mg by mouth nightly.        Current Medications:     Current Facility-Administered Medications   Medication Dose Route Frequency    piperacillin-tazobactam (ZOSYN) 4.5 g in 0.9% sodium chloride (MBP/ADV) 100 mL MBP ###EXTENDED 4 HOUR INFUSION###  4.5 g IntraVENous Q8H    potassium chloride 10 mEq in 100 ml IVPB  10 mEq IntraVENous Q1H    vancomycin (VANCOCIN) 1,000 mg in 0.9% sodium chloride (MBP/ADV) 250 mL  1,000 mg IntraVENous ONCE    [START ON 2018] vancomycin (VANCOCIN) 500 mg in 0.9% sodium chloride (MBP/ADV) 100 mL  500 mg IntraVENous Q24H    [START ON 2018] VANCOMYCIN INFORMATION NOTE   Other ONCE    VANCOMYCIN INFORMATION NOTE 1 Each  1 Each Other Rx Dosing/Monitoring    sodium chloride (NS) flush 5-10 mL  5-10 mL IntraVENous PRN    [START ON 2018] levoFLOXacin (LEVAQUIN) 500 mg in D5W IVPB  500 mg IntraVENous Q48H    NOREPINephrine (LEVOPHED) 8 mg in 5% dextrose 250mL infusion  2-16 mcg/min IntraVENous TITRATE    aspirin delayed-release tablet 81 mg  81 mg Oral DAILY    atorvastatin (LIPITOR) tablet 40 mg  40 mg Oral DAILY    sodium chloride (NS) flush 5-10 mL  5-10 mL IntraVENous Q8H    sodium chloride (NS) flush 5-10 mL  5-10 mL IntraVENous PRN    0.9% sodium chloride infusion  150 mL/hr IntraVENous CONTINUOUS    acetaminophen (TYLENOL) tablet 650 mg  650 mg Oral Q6H PRN    heparin (porcine) injection 5,000 Units  5,000 Units SubCUTAneous Q8H    [START ON 2018] levothyroxine (SYNTHROID) injection 37.5 mcg  37.5 mcg IntraVENous Q24H    pantoprazole (PROTONIX) 40 mg in sodium chloride 10 mL injection  40 mg IntraVENous DAILY       Review of Systems:   12 points ROS attempted but unable as pt with dementia and confused. She denies any complain when asked for.      Physical Exam:  Vitals  Temp (24hrs), Av.6 °F (37 °C), Min:97.6 °F (36.4 °C), Max:101.4 °F (38.6 °C)    Visit Vitals    /50    Pulse 89    Temp 97.8 °F (36.6 °C)    Resp 21    Ht 5' 5\" (1.651 m)    Wt 50.5 kg (111 lb 5.3 oz)    SpO2 100%    Breastfeeding No    BMI 18.53 kg/m2       General: Thin fairly developed and malnourished, 80y.o. year-old, female, in no acute distress, holding to her soft toy cat  HEENT: Normocephalic, anicteric sclerae, Pupils equal, round reactive to light, no oropharyngeal lesions. Missing teeth. No sinus tenderness. Neck: Supple, no lymphadenopathy, masses or thyromegaly, Rt IJ Line+  Chest: Symmetrical expansion  Lungs: Basal crackles+ bilaterally, no dullness  Heart: Regular rhythm, systolic murmur+, no rubs or gallops, No JVD  Abdomen: Soft, tender in RLQ and hypogastric area, no rebound, non distended, no organomegaly, BS+  Musculoskeletal: b/l leg edema L>R, no calf tenderness. No clubbing or cyanosis  CNS: AAOx1. Follows simple commands  SKIN: No skin lesion or rash. Dry, warm, intact          Labs: Results:   Chemistry Recent Labs      02/23/18   0300  02/22/18   1030   GLU  114*  116*   NA  146*  142   K  3.7  4.3   CL  115*  106   CO2  18*  26   BUN  26*  29*   CREA  1.06  1.32*   CA  7.5*  7.9*   AGAP  13  10   BUCR  25*  22*   AP  119*  130*   TP  4.8*  6.4   ALB  1.5*  2.0*   GLOB  3.3  4.4*   AGRAT  0.5*  0.5*      CBC w/Diff Recent Labs      02/23/18   0300  02/22/18   1030   WBC  18.1*  17.4*   RBC  3.36*  3.91*   HGB  9.4*  11.0*   HCT  29.6*  34.2*   PLT  246  261      Microbiology Recent Labs      02/22/18   1809  02/22/18   1048  02/22/18   1030   CULT  NO GROWTH AFTER 12 HOURS  PENDING  PENDING          Imaging-      Results from Hospital Encounter encounter on 02/22/18   XR CHEST PORT   Narrative Chest, single view    Indication: Central line placement    Comparison: 2/22/2018    Findings:  Portable upright AP view of the chest was obtained. Right IJ approach  central venous catheter noted with tip projecting over the inferior cavoatrial  junction. The lung volumes are low, with mild interval increase opacity the  right lung base. No pneumothorax or large pleural effusion. Cardiac size and  mediastinal contours are stable. No acute osseous abnormality. Impression Impression:    1.  Low position of the right IJ approach central venous catheter, projecting  over the inferior cavoatrial junction. -- Discussed with Dr. Alfa Erazo, ED physician 330pm on 2/22/2018.    2. Interval increase in right basilar opacity, favored to represent atelectasis  given the overall degree of pulmonary hypoinflation and short interval change. No results found for this or any previous visit.    ---------------------------------------------------------------------------------------------------------------  I have independently examined the patient and reviewed all lab studies and imgaing as well as review of nursing notes and physican notes from the past 24 hours. The plan of care has been discussed with the patient/relative and all questions are answered.        Alexander Stearns MD  2/23/2018    [facility] AT THE [facility]  [phone]

## 2018-02-23 NOTE — PROGRESS NOTES
Physical Exam   Skin: Skin is warm and dry. Abrasion, ecchymosis and laceration noted.           Skin assessed with Rebecca Al

## 2018-02-23 NOTE — PROGRESS NOTES
Problem: Dysphagia (Adult)  Goal: *Acute Goals and Plan of Care (Insert Text)  Recommendations:  Diet: Puree/thin liquids  Meds: crushed  Aspiration Precautions  Oral Care TID    Goals:  Patient will:  1. Tolerate PO trials with 0 s/s overt distress in 4/5 trials  2. Utilize compensatory swallow strategies/maneuvers (decrease bite/sip, size/rate, alt. liq/sol) with min cues in 4/5 trials  3. Complete an objective swallow study (i.e., MBSS) to assess swallow integrity, r/o aspiration, and determine of safest LRD, min A       Outcome: Progressing Towards Goal    Speech LAnguage Pathology bedside swallow   evaluation & TREATMENT     Patient: Freada Alpers (67 y.o. female)  Date: 2/23/2018  Primary Diagnosis: Sepsis associated hypotension (HCC)        Precautions: Aspiration       ASSESSMENT :  Based on the objective data described below, the patient presents with moderate oropharyngeal dysphagia in the setting of general debility. Pt alert to self; not oriented, pleasantly confused. Oral-motor exam revealed structures grossly intact for mastication and deglutition. Pt self-fed thin liquids + straw with no s/s aspiration. Pt self-fed mech-soft, demo impulsivity (large bite size), labored bolus prep and transport, holding of bolus, unable to achieve 100% clearance despite additional wait time, max multimodal cues, and liquid wash; s/s aspiration observed (teary eyes, altered vocal quality). Resolved with puree. At this time, pt is safest for puree/thin liquid diet; meds crushed in puree; small bites and sips. SLP will follow for diet tolerance and advancement as indicated. D/w RN Stone.     Skilled therapy initiated with multimodal cueing and educated pt on aspiration precautions and importance of compensatory swallow techniques to decrease aspiration risk (decrease rate of intake & sip/bite size, upright @HOB for all po intake and ~30 minutes after po); verbalized comprehension.     Patient will benefit from skilled intervention to address the above impairments. Patients rehabilitation potential is considered to be Fair  Factors which may influence rehabilitation potential include:   []            None noted  [x]            Mental ability/status  [x]            Medical condition  [x]            Home/family situation and support systems  [x]            Safety awareness  []            Pain tolerance/management  []            Other:      PLAN :  Recommendations and Planned Interventions:  Puree/thin liquids; meds crushed in puree; small bites/sips. Frequency/Duration: Patient will be followed by speech-language pathology 1-2 times per day/4-7 days per week to address goals. Discharge Recommendations: Skilled Nursing Facility     SUBJECTIVE:   Patient stated you girls are something else taking such good care of me. OBJECTIVE:   History reviewed. No pertinent past medical history. History reviewed. No pertinent surgical history.   Prior Level of Function/Home Situation:   Home Situation  Home Environment: Skilled nursing facility  One/Two Story Residence: One story  Living Alone: No  Support Systems: Skilled nursing facility  Patient Expects to be Discharged to[de-identified] Skilled nursing facility  Current DME Used/Available at Home: None  Diet prior to admission: unknown  Current Diet:  Puree/thin liquids; meds crushed   Cognitive and Communication Status:  Neurologic State: Drowsy, Eyes open to stimulus  Orientation Level: Oriented to person, Disoriented to time, Disoriented to situation, Disoriented to place  Cognition: Follows commands, Memory loss    GCODESwallowing: [id number] Swallow Current Status CJ= 20-39%   Swallow Goal Status CI= 1-19%    The severity rating is based on the following outcomes:  PIERCE Noms Swallow Level 5    Clinical Judgement    PAIN:  Start of Eval/Tx: 0  End of Eval/Tx: 0     After treatment:   []            Patient left in no apparent distress sitting up in chair  [x]            Patient left in no apparent distress in bed  [x]            Call bell left within reach  [x]            Nursing notified  []            Family present  []            Caregiver present  []            Bed alarm activated    COMMUNICATION/EDUCATION:   [x]            Aspiration precautions; swallow safety; compensatory techniques. []            Patient/family have participated as able in goal setting and plan of care. [x]            Patient/family agree to work toward stated goals and plan of care. []            Patient understands intent and goals of therapy; neutral about participation. [x]            Patient unable to participate in goal setting/plan of care; educ ongoing with interdisciplinary staff  []         Posted safety precautions in patient's room.     Thank you for this referral.  Amy Dale, SLP Intern    Evaluation  Time: 12 minutes  Treatment Time: 9 minutes

## 2018-02-23 NOTE — PROGRESS NOTES
[facility]   Discharge Planning/ Assessment    Reasons for Intervention: Attempted toInterviewed patient, she agrees to share her discharge information with her daughter, see below. Unable to verify Demographic information. She lives in assisted living at [facility] according to the demographic sheet and sees Dr Moreno Gates for her primary care needs. I have attempted to reach the patient s daughter x2 without success. The patient will likely require rehab at discharge. Case management will follow and continue to try and reach [facility].       High Risk Criteria  [x] Yes  []No   Physician Referral  [] Yes  [x]No        Date    Nursing Referral  [] Yes  [x]No        Date    Patient/Family Request  [] Yes  [x]No        Date       Resources:    Medicare  [x] Yes  [x]No   Medicaid  [] Yes  [x]No   No Resources  [] Yes  [x]No   Private Insurance  [x] Yes  []No [employer]    Name/Phone Number    Other  [] Yes  [x]No        (i.e. Workman's Comp)         Prior Services:    Prior Services  [] Yes  [x]No   Home Health  [] Yes  [x]No   [address]  [] Yes  [x]No        [address] Program  [] Yes  [x]No       Meals on Wheels  [] Yes  [x]No   Office on Aging  [] Yes  [x]No   Transportation Services  [] Yes  [x]No   Nursing Home  [] Yes  [x]No        Nursing Home Name    [address]  [] Yes  [x]No        [address] Name    Other       Information Source:      Information obtained from  [x] Patient  [] Parent   [] [address] [place]  [] Child  [] Spouse   [] Significant Other/Partner   [] Friend      [] EMS    [] Nursing Home Chart          [x] Other:chart   Chart Review  [x] Yes  []No     Family/Support System:    Patient lives with  [] Alone    [] Spouse   [] Significant Other  [] Children  [] Caretaker   [] Parent  [] Sibling     [x] Other [address] [facility] [address]      Other Support System:    Is the patient responsible for care of others  [] Yes [x]No   Information of person caring for patient on  discharge    Managers financial affairs independently  [x] Yes  []No   If no, explain:      Status Prior to Admission:    Mental Status  [] Awake  [] Alert  [] Oriented  [] Quiet/Calm [x] Lethargic/Sedated   [] Disoriented  [] Restless/Anxious  [] Combative   Personal Care  [] Dependent  [] [address]  [x] Requires Assistance   Meal Preparation Ability  [] Independent   [] Standby Assistance   [] Minimal Assistance   [] Moderate Assistance  [x] Maximum Assistance     [] Total Assistance   Chores  [] Independent with Chores   [] N/A Nursing Home Resident   [x] Requires Assistance   Bowel/Bladder  [] Continent  [] Catheter  [] Incontinent  [] Ostomy Self-Care    [] Urine Diversion Self-Care  [] Maximum Assistance     [] Total Assistance   Number of Persons needed for assistance    DME at home  [] [address], [place], Domingo Furry  [] [address] [place] [address], Ne, Straight   [] Commode    [] Bathroom/Grab Bars  [] Hospital Bed  [] Nebulizer  [] Oxygen           [] Raised Toilet Seat  [] Shower Chair  [] Side Rails for Bed   [] Tub Transfer Bench   [] [place]  [] Alejandro Musa, Standard      [] Other:   Vendor      Treatment Presently Receiving:    Current Treatments  [] Chemotherapy  [] Dialysis  [] Insulin  [] IVAB [x] IVF   [] O2  [] PCA   [] PT   [] RT   [] Tube Feedings   [] Wound Care     Psychosocial Evaluation:    Verbalized Knowledge of Disease Process  [x] Patient  [x]Family   Coping with Disease Process  [] Patient  []Family   Requires Further Counseling Coping with Disease Process  [] Patient  []Family     Identified Projected Needs:    Home Health Aid  [] Yes  [x]No   Transportation  [] Yes  [x]No   Education  [] Yes  [x]No        Specific Education     Financial Counseling  [] Yes  [x]No   Inability to Care for Self/Will Require 24 hour care  [] Yes  [x]No   Pain Management  [] Yes  [x]No   Home Infusion Therapy  [] Yes  [x]No   Oxygen Therapy  [] Yes  [x]No   DME  [] Yes  [x]No [address] Placement  [] Yes  [x]No   Rehab  [] Yes  [x]No   Physical Therapy  [] Yes  [x]No   Needs Anticipated At This Time  [] Yes  [x]No     Intra-Hospital Referral:    [address] [facility]  [] Yes  [x]No     [] Yes  [x]No   Patient Representative  [] Yes  [x]No   Staff for Teaching Needs  [] Yes  [x]No   Specialty Teaching Needs     Diabetic Educator  [] Yes  [x]No   Referral for Diabetic Educator Needed  [] Yes  [x]No  If Yes, place order for Nutritionist or Diabetic Consult     Tentative Discharge Plan:    Home with No Services  [x] Yes  []No   Home with [address]  [] Yes  [x]No        If Yes, specify type    Home Care Program  [] Yes  [x]No        If Yes, specify type    Meals on Wheels  [] Yes  [x]No   Office of Aging  [] Yes  [x]No   NHP  [] Yes  [x]No   Return to the Nursing Home  [] Yes  [x]No   Rehab Therapy  [] Yes  [x]No   Acute Rehab  [] Yes  [x]No   Subacute Rehab  [] Yes  [x]No   Private Care  [] Yes  [x]No   Substance Abuse Referral  [] Yes  [x]No   Transportation  [] Yes  [x]No   Chore Service  [] Yes  [x]No   Inpatient Hospice  [] Yes  [x]No   OP RT  [] Yes  [x] No   OP Hemo  [] Yes  [x] No   OP PT  [] Yes  [x]No   Support Group  [] Yes  [x]No   Reach to Recovery  [] Yes  [x]No   OP Oncology Clinic  [] Yes  [x]No   Clinic Appointment  [] Yes  [x]No   DME  [] Yes  [x]No   Comments    Name of D/C Planner or  Given to Patient or Family John Byers, RN   Phone Number         Extension    Date Feb 23, 2018   Time    If you are discharged home, whom do you designate to participate in your discharge plan and receive any information needed?      Enter name of Dora Ramirez  daughter          Phone # of dhruv / [phone]        Address of galejb         Updated Feb 23, 2018        Patient refused to designate any           individual

## 2018-02-23 NOTE — PROGRESS NOTES
[facility]   Pulmonary, Critical Care, and Sleep Medicine     Critical Care Progress Note    Name: Talita Landrum   : 3/9/1928   MRN: [id number]   Date: 2018                                 [x]I have reviewed the flowsheet and previous days notes. Events, vitals, medications and notes from last 24 hours reviewed. Care plan discussed on multidisciplinary rounds. [] The patient is unable to give any meaningful history or review of systems because the patient is:  [] Intubated [] Sedated   [] Unresponsive []      []The patient is critically ill on      [] Mechanical ventilation [] Vasoactive agents   [] BiPAP [] Inotropes                 SUBJECTIVE  - This 80 y.o.  female nursing home resident is seen in consultation at the request of Dr. Ibeth Del Rosario for recommendations on further evaluation and management of sepsis. The patient has severe dementia. She believes that Idris West (not Luis Fernando) is POTUS. The last president she voted for was [place]. She was apparently admitted for further evaluation and management of sepsis. I was called to see this patient by the nurse, prior to receiving consultation request, because the patient was in a regular, narrow-complex, tachyarrhythmia (HR 170s) and was starting to become hypotensive. Administration of adenosine 6 mg (single dose) resulted in conversion of a supraventricular tachycardia to sinus tachcardia ().    She reportedly presented to the ER from her nursing home with complaints of generalized fatigue and weakness for an undisclosed amount of time. She has severe dementia. She was febrile (101.5) and hypotensive, which was reported to be refractory to IV fluid resuscitation and was placed on norepinephrine.     - Overnight events: remains on norepinephrine gtt. Continues to get NS @ 150 mL/hr. U/O starting to improve and less turbid today. Remains NPO pending swallow evaluation. [] Nutrition:   [] BM today.     ROS: Review of systems not obtained due to patient factors. Past Medical History:  History reviewed. No pertinent past medical history.      Allergy:  No Known Allergies       Current Facility-Administered Medications:     piperacillin-tazobactam (ZOSYN) 4.5 g in 0.9% sodium chloride (MBP/ADV) 100 mL MBP ###EXTENDED 4 HOUR INFUSION###, 4.5 g, IntraVENous, Q8H, Tacos Boogie MD, 4.5 g at 02/23/18 1200    potassium chloride 10 mEq in 100 ml IVPB, 10 mEq, IntraVENous, Q1H, Paulette Gutierrez MD, Last Rate: 100 mL/hr at 02/23/18 1130, 10 mEq at 02/23/18 1130    vancomycin (VANCOCIN) 1,000 mg in 0.9% sodium chloride (MBP/ADV) 250 mL, 1,000 mg, IntraVENous, ONCE, Paulette Gutierrez MD  [facility] vancomycin (VANCOCIN) 500 mg in 0.9% sodium chloride (MBP/ADV) 100 mL, 500 mg, IntraVENous, Q24H, Paulette Gutierrez MD    [START ON 2/26/2018] VANCOMYCIN INFORMATION NOTE, , Other, ONCE, Paulette Gutierrez MD    VANCOMYCIN INFORMATION NOTE 1 Each, 1 Each, Other, Rx Dosing/Monitoring, Paulette Gutierrez MD    sodium chloride (NS) flush 5-10 mL, 5-10 mL, IntraVENous, PRN, Juana Dykes MD  [facility]  [START ON 2/24/2018] levoFLOXacin (LEVAQUIN) 500 mg in D5W IVPB, 500 mg, IntraVENous, Q48H, Juana Dykes MD    NOREPINephrine (LEVOPHED) 8 mg in 5% dextrose 250mL infusion, 2-16 mcg/min, IntraVENous, TITRATE, Juana Dykes MD, Last Rate: 30 mL/hr at 02/23/18 0521, 16 mcg/min at 02/23/18 0521    aspirin delayed-release tablet 81 mg, 81 mg, Oral, DAILY, Brooke Hernandez NP, 81 mg at 02/23/18 0829    atorvastatin (LIPITOR) tablet 40 mg, 40 mg, Oral, DAILY, Brooke Hernandez NP, 40 mg at 02/23/18 0829    sodium chloride (NS) flush 5-10 mL, 5-10 mL, IntraVENous, Q8H, Brooke Hernandez NP, 10 mL at 02/23/18 0518    sodium chloride (NS) flush 5-10 mL, 5-10 mL, IntraVENous, PRN, Brooke Hernandez NP    0.9% sodium chloride infusion, 150 mL/hr, IntraVENous, CONTINUOUS, Holcomb MD Keagan, Last Rate: 150 mL/hr at 02/22/18 1902, 150 mL/hr at 02/22/18 1902   acetaminophen (TYLENOL) tablet 650 mg, 650 mg, Oral, Q6H PRN, Jolie Morgan NP, 650 mg at 18    heparin (porcine) injection 5,000 Units, 5,000 Units, SubCUTAneous, Q8H, Jolie Morgan NP, 5,000 Units at 18 0830    [START ON 2018] levothyroxine (SYNTHROID) injection 37.5 mcg, 37.5 mcg, IntraVENous, Q24H, Quinton Logan MD    pantoprazole (PROTONIX) 40 mg in sodium chloride 10 mL injection, 40 mg, IntraVENous, DAILY, Hussain Toribio MD, 40 mg at 18 0829      OBJECTIVE  Vital Signs:    Visit Vitals    /50    Pulse 89    Temp 97.8 °F (36.6 °C)    Resp 21    Ht 5' 5\" (1.651 m)    Wt 50.5 kg (111 lb 5.3 oz)    SpO2 100%    Breastfeeding No    BMI 18.53 kg/m2       O2 Device: Nasal cannula   O2 Flow Rate (L/min): 4 l/min   Temp (24hrs), Av.6 °F (37 °C), Min:97.6 °F (36.4 °C), Max:101.4 °F (38.6 °C)       PHYSICAL EXAM:   General: alert, awake and oriented to person, place. Cooperative. No acute distress. Head: atraumatic, normocephalic  Eye: PERRLA, EOM intact, no scleral icterus, no pallor, no cyanosis  Nose: Nares are patent. No polyps. No exudate. No sinus tenderness. Throat: No oral thrush. No exudate. Mucous membranes are moist. No tonsillar enlargement. Neck: supple, no thyromegaly, no JVD, no lymphadenopathy. Trachea midline  Lung: Symmetric in development and expansion. Good air entry. No crackles. No wheezes. Heart: Regular S1, S2 without murmur, rub or gallop. Abdomen: soft, nontender, nondistended. Normoactive bowel sounds. No rebound. No guarding. Bryant in situ with visibly turbid urine, yellow, opaque fluid. Extremities: asymmetric LE edema (trace RLE, 2+ LLE), no cyanosis, no clubbing, 2+ peripheral pulses in DP  Lymphatic: no cervical/axillary/inguinal lymphadenopathy  Neurologic: Cranial nerves II-XII are grossly symmetric and physiologic. Babinski negative. No sensory deficit. No motor deficit. DTR [email or web address], 2+@LUE, 2+@RLE, 2+@LLE. No cerebellar signs. Gait was not assessed. Skin: No rash or lesion. DATA:   SvO2 55%    Intake/Output:   Last shift:      02/23 0701 - 02/23 1900  In: 1000 [P.O.:50; I.V.:950]  Out: 215 [Urine:215]    Last 3 shifts: 02/21 1901 - 02/23 0700  In: 4572.1 [I.V.:4572.1]  Out: 495 [Urine:495]      Intake/Output Summary (Last 24 hours) at 02/23/18 1221  Last data filed at 02/23/18 1200   Gross per 24 hour   Intake          5572.14 ml   Output              710 ml   Net          4862.14 ml       Last 3 Recorded Weights in this Encounter    02/22/18 1038 02/22/18 1425 02/22/18 2245   Weight: 41.3 kg (91 lb) 46 kg (101 lb 6.6 oz) 50.5 kg (111 lb 5.3 oz)       Hemodynamics:   . @MAP  72   . @CVP       Lines: All central lines examined by me. No signs of erythema, induration, discharge. Central Venous Catheter:  Triple Lumen 02/22/18 Right Internal jugular (Active)   Central Line Being Utilized Yes 2/23/2018 12:00 PM   Criteria for Appropriate Use Irritant/vesicant medication 2/23/2018 12:00 PM   Site Assessment Clean, dry, & intact 2/23/2018 12:00 PM   Infiltration Assessment 0 2/23/2018 12:00 PM   Affected Extremity/Extremities Color distal to insertion site pink (or appropriate for race); Pulses palpable;Range of motion performed 2/23/2018 12:00 PM   Date of Last Dressing Change 02/22/18 2/23/2018  8:00 AM   Dressing Status Clean, dry, & intact 2/23/2018  8:00 AM   Dressing Type Disk with Chlorhexadine gluconate (CHG); Transparent 2/23/2018  8:00 AM   Action Taken Open ports on tubing capped 2/23/2018 12:00 AM   Proximal Hub Color/Line Status White; Infusing 2/23/2018 12:00 AM   Positive Blood Return (Medial Site) Yes 2/23/2018 12:00 AM   Medial Hub Color/Line Status Blue; Infusing 2/23/2018 12:00 AM   Positive Blood Return (Lateral Site) Yes 2/23/2018 12:00 AM   Distal Hub Color/Line Status Brown; Infusing 2/23/2018 12:00 AM   Positive Blood Return (Site #3) Yes 2/23/2018 12:00 AM   Alcohol Cap Used Yes 2/23/2018  8:00 AM     Peripheral Intravenous Line:  Peripheral IV 02/22/18 Left Antecubital (Active)   Site Assessment Clean, dry, & intact 2/23/2018 12:00 PM   Phlebitis Assessment 0 2/23/2018 12:00 PM   Infiltration Assessment 0 2/23/2018 12:00 PM   Dressing Status Clean, dry, & intact 2/23/2018 12:00 PM   Dressing Type Transparent 2/23/2018  8:00 AM   Hub Color/Line Status Capped 2/23/2018  8:00 AM   Action Taken Open ports on tubing capped 2/22/2018  8:00 PM   Alcohol Cap Used Yes 2/23/2018  8:00 AM       Peripheral IV 02/22/18 Right Arm (Active)   Site Assessment Clean, dry, & intact 2/23/2018 12:00 PM   Phlebitis Assessment 0 2/23/2018 12:00 PM   Infiltration Assessment 0 2/23/2018 12:00 PM   Dressing Status Clean, dry, & intact 2/23/2018 12:00 PM   Dressing Type Transparent 2/23/2018  8:00 AM   Hub Color/Line Status Capped 2/23/2018  8:00 AM   Action Taken Open ports on tubing capped 2/22/2018  8:00 PM   Alcohol Cap Used Yes 2/23/2018  8:00 AM       Telemetry: [x]Sinus []A-flutter []Paced    []A-fib []Multiple PVCs     Imaging:  [x] I have personally reviewed the patients radiographs  [] Radiographs reviewed with radiologist  [x] No CXR study available for review today  [] No change from prior, tubes and lines are in adequate position  [] Improved   [] Worsening    CXR: (2/22) R basilar atelectasis. R IJ CVC with tip in the IVC noted. 2D-ECHO (2/22): LVEF 45-50%; hypokinesis of the basal-mid anteroseptal, mid inferoseptal, apical inferior, and mid anterolateral wall(s); akinesis of the entire anterior, apical septal, apical lateral, and apical wall(s); wall thickness was mildly to moderately increased; grade 1 diastolic dysfunction; RV systolic function reduced; mild mitral regurgitation; moderate to severe aortic stenosis; mild tricuspid regurgitation; IVC dilated, respirophasic change in diameter was more than 50%.                  Labs:  Recent Results (from the past 24 hour(s))   POC LACTIC ACID    Collection Time: 02/22/18  1:53 PM Result Value Ref Range    Lactic Acid (POC) 2.5 (HH) 0.4 - 2.0 mmol/L   CARDIAC PANEL,(CK, CKMB & TROPONIN)    Collection Time: 02/22/18  4:38 PM   Result Value Ref Range    CK 57 26 - 192 U/L    CK - MB 1.4 <3.6 ng/ml    CK-MB Index 2.5 0.0 - 4.0 %    Troponin-I, Qt. 0.62 (H) 0.0 - 0.045 NG/ML   MAGNESIUM    Collection Time: 02/22/18  4:38 PM   Result Value Ref Range    Magnesium 2.4 1.6 - 2.6 mg/dL   PHOSPHORUS    Collection Time: 02/22/18  4:38 PM   Result Value Ref Range    Phosphorus 3.6 2.5 - 4.9 MG/DL   CALCIUM, IONIZED    Collection Time: 02/22/18  4:38 PM   Result Value Ref Range    Ionized Calcium 0.93 (L) 1.12 - 1.32 MMOL/L   EKG, 12 LEAD, SUBSEQUENT    Collection Time: 02/22/18  4:51 PM   Result Value Ref Range    Ventricular Rate 172 BPM    Atrial Rate 19 BPM    QRS Duration 86 ms    Q-T Interval 278 ms    QTC Calculation (Bezet) 470 ms    Calculated R Axis -56 degrees    Calculated T Axis 95 degrees    Diagnosis       Supraventricular tachycardia  Left axis deviation  Inferior infarct (cited on or before 22-FEB-2018)  Anteroseptal infarct (cited on or before 22-FEB-2018)  Abnormal ECG  When compared with ECG of 22-FEB-2018 11:51,  Significant changes have occurred  Confirmed by Ivie Severance ([id number] [phone]) on 2/22/2018 8:50:48 PM     CULTURE, URINE    Collection Time: 02/22/18  6:09 PM   Result Value Ref Range    Special Requests: NO SPECIAL REQUESTS      Culture result: NO GROWTH AFTER 12 HOURS     URINALYSIS W/ RFLX MICROSCOPIC    Collection Time: 02/22/18  6:09 PM   Result Value Ref Range    Color DARK YELLOW      Appearance TURBID      Specific gravity 1.030 1.005 - 1.030      pH (UA) 5.0 5.0 - 8.0      Protein 30 (A) NEG mg/dL    Glucose NEGATIVE  NEG mg/dL    Ketone NEGATIVE  NEG mg/dL    Bilirubin NEGATIVE  NEG      Blood NEGATIVE  NEG      Urobilinogen 0.2 0.2 - 1.0 EU/dL    Nitrites NEGATIVE  NEG      Leukocyte Esterase NEGATIVE  NEG     URINE MICROSCOPIC ONLY    Collection Time: 02/22/18  6:09 PM   Result Value Ref Range    WBC 2 to 4 0 - 4 /hpf    RBC 0 0 - 5 /hpf    Epithelial cells FEW 0 - 5 /lpf    Bacteria 3+ (A) NEG /hpf    Amorphous Crystals 3+ (A) NEG    Uric acid crystals FEW (A) NEG     POC VENOUS BLOOD GAS    Collection Time: 02/22/18  6:25 PM   Result Value Ref Range    Device: NASAL CANNULA      Flow rate (POC) 3 L/M    FIO2 (POC) 0.32 %    pH, venous (POC) 7.367 7.32 - 7.42      pCO2, venous (POC) 36.7 (L) 41 - 51 MMHG    pO2, venous (POC) 30 25 - 40 mmHg    HCO3, venous (POC) 21.0 (L) 23.0 - 28.0 MMOL/L    sO2, venous (POC) 55 (L) 65 - 88 %    Base deficit, venous (POC) 4 mmol/L    Allens test (POC) YES      Total resp. rate 22      Site OTHER      Patient temp. 99.1      Specimen type (POC) VENOUS BLOOD      Performed by Harjit Leslie    LACTIC ACID    Collection Time: 02/22/18  7:46 PM   Result Value Ref Range    Lactic acid 1.0 0.4 - 2.0 MMOL/L   CARDIAC PANEL,(CK, CKMB & TROPONIN)    Collection Time: 02/22/18 10:30 PM   Result Value Ref Range    CK 89 26 - 192 U/L    CK - MB 1.6 <3.6 ng/ml    CK-MB Index 1.8 0.0 - 4.0 %    Troponin-I, Qt. 0.76 (H) 0.0 - 3.860 NG/ML   METABOLIC PANEL, COMPREHENSIVE    Collection Time: 02/23/18  3:00 AM   Result Value Ref Range    Sodium 146 (H) 136 - 145 mmol/L    Potassium 3.7 3.5 - 5.5 mmol/L    Chloride 115 (H) 100 - 108 mmol/L    CO2 18 (L) 21 - 32 mmol/L    Anion gap 13 3.0 - 18 mmol/L    Glucose 114 (H) 74 - 99 mg/dL    BUN 26 (H) 7.0 - 18 MG/DL    Creatinine 1.06 0.6 - 1.3 MG/DL    BUN/Creatinine ratio 25 (H) 12 - 20      GFR est AA 59 (L) >60 ml/min/1.73m2    GFR est non-AA 49 (L) >60 ml/min/1.73m2    Calcium 7.5 (L) 8.5 - 10.1 MG/DL    Bilirubin, total 0.5 0.2 - 1.0 MG/DL    ALT (SGPT) 13 13 - 56 U/L    AST (SGOT) 24 15 - 37 U/L    Alk.  phosphatase 119 (H) 45 - 117 U/L    Protein, total 4.8 (L) 6.4 - 8.2 g/dL    Albumin 1.5 (L) 3.4 - 5.0 g/dL    Globulin 3.3 2.0 - 4.0 g/dL    A-G Ratio 0.5 (L) 0.8 - 1.7     CBC W/O DIFF    Collection Time: 02/23/18  3:00 AM   Result Value Ref Range    WBC 18.1 (H) 4.6 - 13.2 K/uL    RBC 3.36 (L) 4.20 - 5.30 M/uL    HGB 9.4 (L) 12.0 - 16.0 g/dL    HCT 29.6 (L) 35.0 - 45.0 %    MCV 88.1 74.0 - 97.0 FL    MCH 28.0 24.0 - 34.0 PG    MCHC 31.8 31.0 - 37.0 g/dL    RDW 16.2 (H) 11.6 - 14.5 %    PLATELET 604 568 - 059 K/uL    MPV 12.7 (H) 9.2 - 11.8 FL   CALCIUM, IONIZED    Collection Time: 02/23/18  6:40 AM   Result Value Ref Range    Ionized Calcium 1.13 1.12 - 1.32 MMOL/L   VANCOMYCIN, TROUGH    Collection Time: 02/23/18 10:10 AM   Result Value Ref Range    Vancomycin,trough 6.2 (L) 10.0 - 20.0 ug/mL    Reported dose date: UNKNOWN      Reported dose time: UNKNOWN      Reported dose: UNKNOWN UNITS           Recent Labs      02/22/18   1825   FIO2I  0.32       Recent Labs      02/23/18   0300  02/22/18   1030   WBC  18.1*  17.4*   HGB  9.4*  11.0*   HCT  29.6*  34.2*   PLT  246  261     Recent Labs      02/23/18   0300  02/22/18   1638  02/22/18   1030   NA  146*   --   142   K  3.7   --   4.3   CL  115*   --   106   CO2  18*   --   26   GLU  114*   --   116*   BUN  26*   --   29*   CREA  1.06   --   1.32*   CA  7.5*   --   7.9*   MG   --   2.4   --    PHOS   --   3.6   --    ALB  1.5*   --   2.0*   SGOT  24   --   26   ALT  13   --   17     No results for input(s): PH, PCO2, PO2, HCO3, FIO2 in the last 72 hours.     Lab Results   Component Value Date/Time    Color DARK YELLOW 02/22/2018 06:09 PM    Appearance TURBID 02/22/2018 06:09 PM    Specific gravity 1.030 02/22/2018 06:09 PM    pH (UA) 5.0 02/22/2018 06:09 PM    Protein 30 (A) 02/22/2018 06:09 PM    Glucose NEGATIVE  02/22/2018 06:09 PM    Ketone NEGATIVE  02/22/2018 06:09 PM    Bilirubin NEGATIVE  02/22/2018 06:09 PM    Urobilinogen 0.2 02/22/2018 06:09 PM    Nitrites NEGATIVE  02/22/2018 06:09 PM    Leukocyte Esterase NEGATIVE  02/22/2018 06:09 PM    Epithelial cells FEW 02/22/2018 06:09 PM    Bacteria 3+ (A) 02/22/2018 06:09 PM    WBC 2 to 4 02/22/2018 06:09 PM RBC 0 02/22/2018 06:09 PM       Cultures:   Blood Culture: N/A  Urine Culture: (2/22) NGTD  Sputum culture: N/A  Urine Legionella and Pneumococcal Ag: N/A  C. Diff: N/A        ASSESSMENT/PLAN:   Principal Problem:    Shock ([facility] [address] (2/22/2018)      Overview: Urine may be a source of infection for possible septic shock; however,       SvO2 should be checked in light of abnormal troponin and pro-BNP,       suspicious for cardiogenic shock    Active Problems:    KILO (acute kidney injury) ([address] (2/22/2018)      Acute cystitis without hematuria (2/22/2018)      Elevated troponin (2/22/2018)      Elevated brain natriuretic peptide (BNP) level (2/22/2018)      Acquired hypothyroidism (2/22/2018)      Dementia with behavioral disturbance (2/22/2018)      Supraventricular tachycardia ([address] (2/22/2018)      On empiric therapy for urinary tract infection with Zosyn/levofloxacin/vancomycin for now. NPO  Speech/swallow evaluation  U/S B LE to rule out DVT  Consult Cardiology re: abnormal cardiac enzymes, wall motion abnormality on echo. Case discussed with Ingrid Villasenor (NP for Dr. Yevtte Birch). Glucose control: glucose stabilization per ICU protocol  Malpositioned CVC noted. No need to attempt to reposition at this time. CXR in am.    NUTRITION: NPO pending speech/swallow evaluation. ICU electrolyte replacement protocol    PROPHYLAXIS:  DVT prophylaxis: heparin  GI prophylaxis: Protonix  HOB 30-degree elevation  Chlorhexidine mouth washes    CODE STATUS: FULL CODE    Further recommendations will be based on the patient's response to recommended treatment and results of the investigation ordered.      DISPOSITION:    The patient is: [x] acutely ill Risk of deterioration: [] moderate    [x] critically ill  [x] high     [x]See my orders for details    My assessment, plan of care, findings, medications, side effects etc were discussed with:  [x] Nurse [x] PT/OT    [x] Respiratory therapy []     [] Family [] Patient: answered all questions to satisfaction   [x] Pharmacist []      [x] Case & management strategies discussed today on multidisciplinary rounds    During this entire length of time I was immediately available to the patient. The reason for providing this level of medical care for this critically ill patient was due a critical illness that impaired one or more vital organ systems such that there was a high probability of imminent or life threatening deterioration in the patients condition. This care involved high complexity decision making to assess, manipulate, and support vital system functions, to treat this degreee vital organ system failure and to prevent further life threatening deterioration of the patients condition    []Total critical care time spent on reviewing the case/medical record/data/notes/EMR/patient examination/documentation/coordinating care with nurse/consultants, exclusive of procedures - minutes with complex decision making performed and > 50% time spent in face to face evaluation.         Cecilio Clark MD   2/23/2018

## 2018-02-24 NOTE — ROUTINE PROCESS
1935 Beside verbal shift change report received from DAYANNA Ritter(offgoing nurse) given to Kalyani Carmichael RN(oncoming nurse). Report included SBAR,MAR,KARDEX,TELE,I/O  0006 Called lab to get results to aptt. spk with donald. aptt results greater than 180.  Gtt turned off will resume heparin [email or web address]  0036 Bedside verbal shift change report given to [facility], DAYANNA(oncoming nurse) by Kalyani Carmichael RN(offgoing nurse)

## 2018-02-24 NOTE — PROGRESS NOTES
Physical Exam   Skin:             Primary Nurse Patience Arreola and Mayelin Stoddard RN performed a dual skin assessment on this patient Impairment noted- see wound doc flow sheet

## 2018-02-24 NOTE — PROGRESS NOTES
Pharmacy Dosing Services: Vancomycin    Indication: Sepsis of unknown etiology    Day of therapy: 2    Other Antimicrobials (Include dose, start day & day of therapy):  Zosyn 4.5g IV q8h EIPT (stated )      Loading dose (date given): 1000mg-    Current Maintenance dose: 500 mg IV q24h     Goal Vancomycin Level: 15-20  (Trough 15-20 for most infections, 20 for meningitis/osteomyelitis, pre-HD level ~25)    Vancomycin Level (if drawn):    - 6.2 (21hr post dose)- patient not properly loaded     Significant Cultures:    blood- NGTD   urine - NGTD    Renal function stable? (unstable defined as SCr increase of 0.5 mg/dL or > 50% increase from baseline, whichever is greater) (Y/N): Y    CAPD, Hemodialysis or Renal Replacement Therapy (Y/N): N     Recent Labs      18   0400  18   0300  18   1030   CREA  0.92  1.06  1.32*   BUN  21*  26*  29*   WBC  27.5*  18.1*  17.4*     Temp (24hrs), Av.3 °F (36.8 °C), Min:97.8 °F (36.6 °C), Max:99.1 °F (37.3 °C)    Creatinine Clearance (Creatinine Clearance (ml/min)): 28 ml/min     Regimen assessment: KILO resolved, changed dose to 750mg IV q24h (predicted trough ~15)  Maintenance dose: 750 mg IV q24h  Next scheduled level:  prior to 1200 dose       Pharmacy will follow daily and adjust medications as appropriate for renal function and/or serum levels.     Thank you,  Chepe Stewart, PHARMD

## 2018-02-24 NOTE — PROGRESS NOTES
Bedside shift change report given to Clay Coffey (oncoming nurse) by Isabella Wasserman RN   (offgoing nurse). Report included the following information SBAR, MAR and Recent Results.

## 2018-02-24 NOTE — PROGRESS NOTES
Problem: Falls - Risk of  Goal: *Absence of Falls  Document Bahmna Fall Risk and appropriate interventions in the flowsheet.    Outcome: Progressing Towards Goal  Fall Risk Interventions:  Mobility Interventions: Communicate number of staff needed for ambulation/transfer    Mentation Interventions: Door open when patient unattended    Medication Interventions: Evaluate medications/consider consulting pharmacy    Elimination Interventions: Patient to call for help with toileting needs

## 2018-02-24 NOTE — PROGRESS NOTES
Bedside shift change report given to Amparo (oncoming nurse) by Juventino Randall RN   (offgoing nurse). Report included the following information SBAR, MAR and Recent Results.

## 2018-02-24 NOTE — PROGRESS NOTES
[facility]   Pulmonary, Critical Care, and Sleep Medicine     Critical Care Progress Note    Name: Brittny Pathak   : 3/9/1928   MRN: [id number]   Date: 2018                                 [x]I have reviewed the flowsheet and previous days notes. Events, vitals, medications and notes from last 24 hours reviewed. Care plan discussed on multidisciplinary rounds. [] The patient is unable to give any meaningful history or review of systems because the patient is:  [] Intubated [] Sedated   [] Unresponsive []      []The patient is critically ill on      [] Mechanical ventilation [] Vasoactive agents   [] BiPAP [] Inotropes                 SUBJECTIVE  - This 80 y.o.  female nursing home resident is seen in consultation at the request of Dr. Chastity De Los Santos for recommendations on further evaluation and management of sepsis. The patient has severe dementia. She believes that Jc Smith (not Luis Fernando) is POT. The last president she voted for was [employer] ([employer] She was apparently admitted for further evaluation and management of sepsis. I was called to see this patient by the nurse, prior to receiving consultation request, because the patient was in a regular, narrow-complex, tachyarrhythmia (HR 170s) and was starting to become hypotensive. Administration of adenosine 6 mg (single dose) resulted in conversion of a supraventricular tachycardia to sinus tachcardia ().    She reportedly presented to the ER from her nursing home with complaints of generalized fatigue and weakness for an undisclosed amount of time. She has severe dementia. She was febrile (101.5) and hypotensive, which was reported to be refractory to IV fluid resuscitation and was placed on norepinephrine.     - Overnight events: remains on norepinephrine gtt @ 10 mg/hr. U/O ~ 10 mL/hr. Had nausea this am, so did not eat much. On NDD1 diet. Started on heparin gtt in the interim for acute DVT in the L femoral vein.      [x] Nutrition: NDD1  [] BM today. ROS: Review of systems not obtained due to patient factors. Past Medical History:  History reviewed. No pertinent past medical history.      Allergy:  No Known Allergies       Current Facility-Administered Medications:     vancomycin (VANCOCIN) 750 mg in 0.9% sodium chloride (MBP/ADV) 250 mL ADV, 750 mg, IntraVENous, Q24H, Chandler Renee MD, Last Rate: 125 mL/hr at 02/24/18 1224, 750 mg at 02/24/18 1224    [START ON 2/26/2018] VANCOMYCIN INFORMATION NOTE, , Other, ONCE, Chandler Renee MD    ondansetron [facility] injection 4 mg, 4 mg, IntraVENous, Q6H PRN, Chandler Renee MD, 4 mg at 02/24/18 0900    heparin 25,000 units in D5W 250 ml infusion, 18-36 Units/kg/hr, IntraVENous, TITRATE, Marito Montalvo MD, Last Rate: 8.7 mL/hr at 02/24/18 1118, 15 Units/kg/hr at 02/24/18 1118    piperacillin-tazobactam (ZOSYN) 4.5 g in 0.9% sodium chloride (MBP/ADV) 100 mL MBP ###EXTENDED 4 HOUR INFUSION###, 4.5 g, IntraVENous, Q8H, Quin Patel MD, 4.5 g at 02/24/18 1123    VANCOMYCIN INFORMATION NOTE 1 Each, 1 Each, Other, Rx Dosing/Monitoring, Chandler Renee MD    sodium chloride (NS) flush 5-10 mL, 5-10 mL, IntraVENous, PRN, Juanjo Rucker MD    levoFLOXacin (LEVAQUIN) 500 mg in D5W IVPB, 500 mg, IntraVENous, Q48H, Juanjo Rucker MD, Last Rate: 100 mL/hr at 02/24/18 1022, 500 mg at 02/24/18 1022    NOREPINephrine (LEVOPHED) 8 mg in 5% dextrose 250mL infusion, 2-16 mcg/min, IntraVENous, TITRATE, Juanjo Rucker MD, Last Rate: 18.8 mL/hr at 02/24/18 1336, 10 mcg/min at 02/24/18 1336    aspirin delayed-release tablet 81 mg, 81 mg, Oral, DAILY, Pinky Coello NP, 81 mg at 02/24/18 0464    atorvastatin (LIPITOR) tablet 40 mg, 40 mg, Oral, DAILY, Pinky Coello NP, 40 mg at 02/24/18 2681    sodium chloride (NS) flush 5-10 mL, 5-10 mL, IntraVENous, Q8H, Erum Pan, NP, 10 mL at 02/24/18 1336    sodium chloride (NS) flush 5-10 mL, 5-10 mL, IntraVENous, PRN, Pinky Coello NP    0.9% sodium chloride infusion, 75 mL/hr, IntraVENous, CONTINUOUS, Dano Márquez MD, Last Rate: 75 mL/hr at 18 0958, 75 mL/hr at 18 0958    acetaminophen (TYLENOL) tablet 650 mg, 650 mg, Oral, Q6H PRN, Carrillo Arana NP, 650 mg at 18    [START ON 2018] levothyroxine (SYNTHROID) injection 37.5 mcg, 37.5 mcg, IntraVENous, Q24H, Dano Márquez MD    pantoprazole (PROTONIX) 40 mg in sodium chloride 10 mL injection, 40 mg, IntraVENous, DAILY, Ej Rasmussen MD, 40 mg at 18 7051      OBJECTIVE  Vital Signs:    Visit Vitals    /61    Pulse 94    Temp 98.9 °F (37.2 °C)    Resp 28    Ht 5' 5\" (1.651 m)    Wt 58 kg (127 lb 13.9 oz)    SpO2 99%    Breastfeeding No    BMI 21.28 kg/m2       O2 Device: Nasal cannula   O2 Flow Rate (L/min): 4 l/min   Temp (24hrs), Av.5 °F (36.9 °C), Min:98 °F (36.7 °C), Max:99.1 °F (37.3 °C)       PHYSICAL EXAM:   General: alert, awake and oriented to person, place. Cooperative. No acute distress. Head: atraumatic, normocephalic  Eye: PERRLA, EOM intact, no scleral icterus, no pallor, no cyanosis  Nose: Nares are patent. No polyps. No exudate. No sinus tenderness. Throat: No oral thrush. No exudate. Mucous membranes are moist. No tonsillar enlargement. Neck: supple, no thyromegaly, no JVD, no lymphadenopathy. Trachea midline  Lung: Symmetric in development and expansion. Good air entry. No crackles. No wheezes. Heart: Regular S1, S2 without murmur, rub or gallop. Abdomen: soft, nontender, nondistended. Normoactive bowel sounds. No rebound. No guarding. Bryant in situ with visibly turbid urine, yellow, opaque fluid. Extremities: asymmetric LE edema (trace RLE, 2+ LLE), no cyanosis, no clubbing, 2+ peripheral pulses in DP  Lymphatic: no cervical/axillary/inguinal lymphadenopathy  Neurologic: Cranial nerves II-XII are grossly symmetric and physiologic. Babinski negative. No sensory deficit. No motor deficit. DTR [email or web address], 2+@[email or web address], 2+@HANNAHE, 2+@LLE.  No cerebellar signs. Gait was not assessed. Skin: No rash or lesion. DATA:   SvO2 55%    Intake/Output:   Last shift:      02/24 0701 - 02/24 1900  In: 7657 [I.V.:1442]  Out: 60 [Urine:60]    Last 3 shifts: 02/22 1901 - 02/24 0700  In: 6781.6 [P.O.:50; I.V.:6731.6]  Out: 990 [Urine:990]      Intake/Output Summary (Last 24 hours) at 02/24/18 1343  Last data filed at 02/24/18 1300   Gross per 24 hour   Intake          4854.71 ml   Output              385 ml   Net          4469.71 ml       Last 3 Recorded Weights in this Encounter    02/22/18 2245 02/24/18 0002 02/24/18 0400   Weight: 50.5 kg (111 lb 5.3 oz) 59.7 kg (131 lb 9.8 oz) 58 kg (127 lb 13.9 oz)       Hemodynamics:   . @MAP     . @CVP       Lines: All central lines examined by me. No signs of erythema, induration, discharge. Central Venous Catheter:  Triple Lumen 02/22/18 Right Internal jugular (Active)   Central Line Being Utilized Yes 2/24/2018 12:00 PM   Criteria for Appropriate Use Hemodynamically unstable, requiring monitoring lines, vasopressors, or volume resuscitation 2/24/2018 12:00 PM   Site Assessment Clean, dry, & intact 2/24/2018 12:00 PM   Infiltration Assessment 0 2/24/2018 12:00 PM   Affected Extremity/Extremities Color distal to insertion site pink (or appropriate for race) 2/24/2018 12:00 PM   Date of Last Dressing Change 02/22/18 2/24/2018 12:00 PM   Dressing Status Clean;Clean, dry, & intact 2/24/2018 12:00 PM   Dressing Type Disk with Chlorhexadine gluconate (CHG); Transparent 2/24/2018 12:00 PM   Action Taken Open ports on tubing capped 2/24/2018 12:00 PM   Proximal Hub Color/Line Status White; Infusing 2/24/2018 12:00 PM   Positive Blood Return (Medial Site) Yes 2/24/2018 12:00 PM   Medial Hub Color/Line Status Blue; Infusing 2/24/2018 12:00 PM   Positive Blood Return (Lateral Site) Yes 2/24/2018 12:00 PM   Distal Hub Color/Line Status Brown; Infusing 2/24/2018 12:00 PM   Positive Blood Return (Site #3) Yes 2/24/2018 12:00 PM   Alcohol Cap Used Yes 2/24/2018 12:00 PM      Peripheral Intravenous Line:  Peripheral IV 02/22/18 Left Antecubital (Active)   Site Assessment Clean, dry, & intact 2/24/2018 12:00 PM   Phlebitis Assessment 0 2/24/2018 12:00 PM   Infiltration Assessment 0 2/24/2018 12:00 PM   Dressing Status Clean, dry, & intact 2/24/2018 12:00 PM   Dressing Type Transparent;Tape 2/24/2018 12:00 PM   Hub Color/Line Status Infusing 2/24/2018 12:00 PM   Action Taken Open ports on tubing capped 2/24/2018 12:00 PM   Alcohol Cap Used Yes 2/24/2018 12:00 PM       Peripheral IV 02/22/18 Right Arm (Active)   Site Assessment Clean, dry, & intact 2/24/2018 12:00 PM   Phlebitis Assessment 0 2/24/2018 12:00 PM   Infiltration Assessment 0 2/24/2018 12:00 PM   Dressing Status Clean, dry, & intact 2/24/2018 12:00 PM   Dressing Type Transparent;Tape 2/24/2018 12:00 PM   Hub Color/Line Status Pink 2/24/2018 12:00 PM   Action Taken Open ports on tubing capped 2/24/2018 12:00 PM   Alcohol Cap Used Yes 2/24/2018 12:00 PM       Telemetry: [x]Sinus []A-flutter []Paced    []A-fib []Multiple PVCs     Imaging:  [x] I have personally reviewed the patients radiographs  [] Radiographs reviewed with radiologist  [x] No CXR study available for review today  [] No change from prior, tubes and lines are in adequate position  [] Improved   [] Worsening    CXR: (2/22) R basilar atelectasis. R IJ CVC with tip in the IVC noted. 2D-ECHO (2/22): LVEF 45-50%; hypokinesis of the basal-mid anteroseptal, mid inferoseptal, apical inferior, and mid anterolateral wall(s); akinesis of the entire anterior, apical septal, apical lateral, and apical wall(s); wall thickness was mildly to moderately increased; grade 1 diastolic dysfunction; RV systolic function reduced; mild mitral regurgitation; moderate to severe aortic stenosis; mild tricuspid regurgitation; IVC dilated, respirophasic change in diameter was more than 50%.                  Labs:  Recent Results (from the past 24 hour(s))   PTT    Collection Time: 02/23/18  4:13 PM   Result Value Ref Range    aPTT 34.6 23.0 - 36.4 SEC   POTASSIUM    Collection Time: 02/23/18  7:48 PM   Result Value Ref Range    Potassium 3.6 3.5 - 5.5 mmol/L   PTT    Collection Time: 02/23/18 10:30 PM   Result Value Ref Range    aPTT >180.0 (HH) 23.0 - 36.4 SEC   CBC W/O DIFF    Collection Time: 02/24/18  4:00 AM   Result Value Ref Range    WBC 27.5 (H) 4.6 - 13.2 K/uL    RBC 3.51 (L) 4.20 - 5.30 M/uL    HGB 9.7 (L) 12.0 - 16.0 g/dL    HCT 30.7 (L) 35.0 - 45.0 %    MCV 87.5 74.0 - 97.0 FL    MCH 27.6 24.0 - 34.0 PG    MCHC 31.6 31.0 - 37.0 g/dL    RDW 16.3 (H) 11.6 - 14.5 %    PLATELET 590 913 - 284 K/uL    MPV 13.6 (H) 9.2 - 99.1 FL   METABOLIC PANEL, COMPREHENSIVE    Collection Time: 02/24/18  4:00 AM   Result Value Ref Range    Sodium 146 (H) 136 - 145 mmol/L    Potassium 3.8 3.5 - 5.5 mmol/L    Chloride 119 (H) 100 - 108 mmol/L    CO2 17 (L) 21 - 32 mmol/L    Anion gap 10 3.0 - 18 mmol/L    Glucose 106 (H) 74 - 99 mg/dL    BUN 21 (H) 7.0 - 18 MG/DL    Creatinine 0.92 0.6 - 1.3 MG/DL    BUN/Creatinine ratio 23 (H) 12 - 20      GFR est AA >60 >60 ml/min/1.73m2    GFR est non-AA 57 (L) >60 ml/min/1.73m2    Calcium 7.2 (L) 8.5 - 10.1 MG/DL    Bilirubin, total 0.3 0.2 - 1.0 MG/DL    ALT (SGPT) 15 13 - 56 U/L    AST (SGOT) 25 15 - 37 U/L    Alk.  phosphatase 128 (H) 45 - 117 U/L    Protein, total 4.8 (L) 6.4 - 8.2 g/dL    Albumin 1.5 (L) 3.4 - 5.0 g/dL    Globulin 3.3 2.0 - 4.0 g/dL    A-G Ratio 0.5 (L) 0.8 - 1.7     MAGNESIUM    Collection Time: 02/24/18  4:00 AM   Result Value Ref Range    Magnesium 2.0 1.6 - 2.6 mg/dL   PHOSPHORUS    Collection Time: 02/24/18  4:00 AM   Result Value Ref Range    Phosphorus 2.6 2.5 - 4.9 MG/DL   PTT    Collection Time: 02/24/18  4:40 AM   Result Value Ref Range    aPTT 92.6 (H) 23.0 - 36.4 SEC   GLUCOSE, POC    Collection Time: 02/24/18  7:51 AM   Result Value Ref Range    Glucose (POC) 113 (H) 70 - 110 mg/dL           Recent Labs      02/22/18   1825   FIO2I  0.32       Recent Labs      02/24/18   0400  02/23/18   0300  02/22/18   1030   WBC  27.5*  18.1*  17.4*   HGB  9.7*  9.4*  11.0*   HCT  30.7*  29.6*  34.2*   PLT  233  246  261     Recent Labs      02/24/18   0400  02/23/18   1948  02/23/18   0300  02/22/18   1638  02/22/18   1030   NA  146*   --   146*   --   142   K  3.8  3.6  3.7   --   4.3   CL  119*   --   115*   --   106   CO2  17*   --   18*   --   26   GLU  106*   --   114*   --   116*   BUN  21*   --   26*   --   29*   CREA  0.92   --   1.06   --   1.32*   CA  7.2*   --   7.5*   --   7.9*   MG  2.0   --    --   2.4   --    PHOS  2.6   --    --   3.6   --    ALB  1.5*   --   1.5*   --   2.0*   SGOT  25   --   24   --   26   ALT  15   --   13   --   17     No results for input(s): PH, PCO2, PO2, HCO3, FIO2 in the last 72 hours. Lab Results   Component Value Date/Time    Color DARK YELLOW 02/22/2018 06:09 PM    Appearance TURBID 02/22/2018 06:09 PM    Specific gravity 1.030 02/22/2018 06:09 PM    pH (UA) 5.0 02/22/2018 06:09 PM    Protein 30 (A) 02/22/2018 06:09 PM    Glucose NEGATIVE  02/22/2018 06:09 PM    Ketone NEGATIVE  02/22/2018 06:09 PM    Bilirubin NEGATIVE  02/22/2018 06:09 PM    Urobilinogen 0.2 02/22/2018 06:09 PM    Nitrites NEGATIVE  02/22/2018 06:09 PM    Leukocyte Esterase NEGATIVE  02/22/2018 06:09 PM    Epithelial cells FEW 02/22/2018 06:09 PM    Bacteria 3+ (A) 02/22/2018 06:09 PM    WBC 2 to 4 02/22/2018 06:09 PM    RBC 0 02/22/2018 06:09 PM       Cultures:   Blood Culture: N/A  Urine Culture: (2/22) NGTD  Sputum culture: N/A  Urine Legionella and Pneumococcal Ag: N/A  C.  Diff: N/A        ASSESSMENT/PLAN:   Principal Problem:    Shock ([facility] [place] [facility] (2/22/2018)      Overview: Urine may be a source of infection for possible septic shock; however,       SvO2 should be checked in light of abnormal troponin and pro-BNP,       suspicious for cardiogenic shock    Active Problems:    KILO (acute kidney injury) ([facility] [place] [facility] (2/22/2018)      Acute cystitis without hematuria (2/22/2018)      Elevated troponin (2/22/2018)      Elevated brain natriuretic peptide (BNP) level (2/22/2018)      Acquired hypothyroidism (2/22/2018)      Dementia with behavioral disturbance (2/22/2018)      Supraventricular tachycardia ([address] (2/22/2018)      Acute deep vein thrombosis (DVT) of femoral vein of left lower extremity ([address] (2/23/2018)      On empiric therapy for urinary tract infection with Zosyn/levofloxacin/vancomycin for now. Furosemide 40 mg IV (single dose)  Stop maintenance IV fluids  Glucose control: glucose stabilization per ICU protocol  Malpositioned CVC noted. No need to attempt to reposition at this time. CXR in am.    NUTRITION: NDD1. ICU electrolyte replacement protocol    PROPHYLAXIS:  DVT prophylaxis: heparin gtt  GI prophylaxis: Protonix  HOB 30-degree elevation  Chlorhexidine mouth washes    CODE STATUS: FULL CODE    Further recommendations will be based on the patient's response to recommended treatment and results of the investigation ordered. DISPOSITION:    The patient is: [x] acutely ill Risk of deterioration: [] moderate    [x] critically ill  [x] high     [x]See my orders for details    My assessment, plan of care, findings, medications, side effects etc were discussed with:  [x] Nurse [x] PT/OT    [x] Respiratory therapy []     [] Family [] Patient: answered all questions to satisfaction   [x] Pharmacist []      [x] Case & management strategies discussed today on multidisciplinary rounds    During this entire length of time I was immediately available to the patient. The reason for providing this level of medical care for this critically ill patient was due a critical illness that impaired one or more vital organ systems such that there was a high probability of imminent or life threatening deterioration in the patients condition.  This care involved high complexity decision making to assess, manipulate, and support vital system functions, to treat this degreee vital organ system failure and to prevent further life threatening deterioration of the patients condition    []Total critical care time spent on reviewing the case/medical record/data/notes/EMR/patient examination/documentation/coordinating care with nurse/consultants, exclusive of procedures - minutes with complex decision making performed and > 50% time spent in face to face evaluation.         Latanya Reaves MD   2/24/2018

## 2018-02-24 NOTE — PROGRESS NOTES
0700: Bedside shift change report given to Lito Merida RN (oncoming nurse) by Kannan Luna RN (offgoing nurse). Report included the following information SBAR, MAR and Recent Results. 0830: Gave pt crushed meds mixed with applesauce-pt immediately started complaining of nausea and the urge to vomit. 0845: Called Dr. Yamilex Mitchell to get an order for Zofran. Orders received. 4mg Zofran given. 3378: Holding breakfast tray. 1000: Called dr. Yamilex Mitchell about patient's fluids-low urine output, and appears that she is third spacing. Orders received. 1100: Called Dr. Keira Rasmussen to see if he wants anticoagulation due to pt have a DVT in the L leg. He states \"yes, reorder the heparin drip. \" Orders were received. 1900: Bedside shift change report given to Lina Payan RN (oncoming nurse) by Lito Merida RN (offgoing nurse). Report included the following information SBAR, Kardex, MAR and Recent Results.

## 2018-02-24 NOTE — PROGRESS NOTES
Progress Note      Patient: Nasrin Kerr               Sex: female          DOA: 2/22/2018       YOB: 1928      Age:  80 y.o.        LOS:  LOS: 1 day               Subjective:   Pt has a large dvt inthe left femoral vein and she is on iv heparin .  Will need to worry if she has an underlying malignancy       Objective:      Visit Vitals    /67    Pulse 99    Temp 98.3 °F (36.8 °C)    Resp 26    Ht 5' 5\" (1.651 m)    Wt 50.5 kg (111 lb 5.3 oz)    SpO2 98%    Breastfeeding No    BMI 18.53 kg/m2       Physical Exam:  Pt is awake and is responsive   Heart reg rate and rhythm   Lungs good breath sounds heard abdomen soft and nontender   Extremities left leg is swollen due to the dvt   Neuro nonfocal        Lab/Data Reviewed:  BMP:   Lab Results   Component Value Date/Time     (H) 02/23/2018 03:00 AM    K 3.6 02/23/2018 07:48 PM     (H) 02/23/2018 03:00 AM    CO2 18 (L) 02/23/2018 03:00 AM    AGAP 13 02/23/2018 03:00 AM     (H) 02/23/2018 03:00 AM    BUN 26 (H) 02/23/2018 03:00 AM    CREA 1.06 02/23/2018 03:00 AM    GFRAA 59 (L) 02/23/2018 03:00 AM    GFRNA 49 (L) 02/23/2018 03:00 AM     CMP:   Lab Results   Component Value Date/Time     (H) 02/23/2018 03:00 AM    K 3.6 02/23/2018 07:48 PM     (H) 02/23/2018 03:00 AM    CO2 18 (L) 02/23/2018 03:00 AM    AGAP 13 02/23/2018 03:00 AM     (H) 02/23/2018 03:00 AM    BUN 26 (H) 02/23/2018 03:00 AM    CREA 1.06 02/23/2018 03:00 AM    GFRAA 59 (L) 02/23/2018 03:00 AM    GFRNA 49 (L) 02/23/2018 03:00 AM    CA 7.5 (L) 02/23/2018 03:00 AM    ALB 1.5 (L) 02/23/2018 03:00 AM    TP 4.8 (L) 02/23/2018 03:00 AM    GLOB 3.3 02/23/2018 03:00 AM    AGRAT 0.5 (L) 02/23/2018 03:00 AM    SGOT 24 02/23/2018 03:00 AM    ALT 13 02/23/2018 03:00 AM     CBC:   Lab Results   Component Value Date/Time    WBC 18.1 (H) 02/23/2018 03:00 AM    HGB 9.4 (L) 02/23/2018 03:00 AM    HCT 29.6 (L) 02/23/2018 03:00 AM     02/23/2018 03:00 AM           Assessment/Plan     Principal Problem:    Shock ([address] (2/22/2018)      Overview: Urine may be a source of infection for possible septic shock; however,       SvO2 should be checked in light of abnormal troponin and pro-BNP,       suspicious for cardiogenic shock    Active Problems:    Acquired hypothyroidism (2/22/2018)      KILO (acute kidney injury) ([address] (2/22/2018)      Elevated troponin (2/22/2018)      Acute cystitis without hematuria (2/22/2018)      Dementia with behavioral disturbance (2/22/2018)      Elevated brain natriuretic peptide (BNP) level (2/22/2018)      Supraventricular tachycardia ([address] (2/22/2018)      Acute deep vein thrombosis (DVT) of femoral vein of left lower extremity ([address] (2/23/2018)        Plan:continue iv heparin for now

## 2018-02-24 NOTE — PROGRESS NOTES
Will DC heparin as enzymes do not support acute injury event.  Will leave DVT prophylaxis to Hospitalist team. Patirce Ruelas MD

## 2018-02-25 PROBLEM — R63.6 UNDERWEIGHT: Status: ACTIVE | Noted: 2018-01-01

## 2018-02-25 NOTE — PROGRESS NOTES
Problem: Falls - Risk of  Goal: *Absence of Falls  Document Bahman Fall Risk and appropriate interventions in the flowsheet.    Outcome: Progressing Towards Goal  Fall Risk Interventions:  Mobility Interventions: Assess mobility with egress test    Mentation Interventions: Adequate sleep, hydration, pain control, Bed/chair exit alarm, Door open when patient unattended    Medication Interventions: Assess postural VS orthostatic hypotension, Bed/chair exit alarm, Evaluate medications/consider consulting pharmacy    Elimination Interventions: Bed/chair exit alarm, Call light in reach

## 2018-02-25 NOTE — PROGRESS NOTES
[facility]   Pulmonary, Critical Care, and Sleep Medicine     Critical Care Progress Note    Name: Mariana Reese   : 3/9/1928   MRN: [id number]   Date: 2018                                 [x]I have reviewed the flowsheet and previous days notes. Events, vitals, medications and notes from last 24 hours reviewed. Care plan discussed on multidisciplinary rounds. [] The patient is unable to give any meaningful history or review of systems because the patient is:  [] Intubated [] Sedated   [] Unresponsive []      []The patient is critically ill on      [] Mechanical ventilation [] Vasoactive agents   [] BiPAP [] Inotropes                 SUBJECTIVE  - This 80 y.o.  female nursing home resident is seen in consultation at the request of Dr. Rosa Maria Foster for recommendations on further evaluation and management of sepsis. The patient has severe dementia. She believes that Pool Barrett (not Trump) is POTUS. The last president she voted for was [employer]. She was apparently admitted for further evaluation and management of sepsis. I was called to see this patient by the nurse, prior to receiving consultation request, because the patient was in a regular, narrow-complex, tachyarrhythmia (HR 170s) and was starting to become hypotensive. Administration of adenosine 6 mg (single dose) resulted in conversion of a supraventricular tachycardia to sinus tachcardia ().    She reportedly presented to the ER from her nursing home with complaints of generalized fatigue and weakness for an undisclosed amount of time. She has severe dementia. She was febrile (101.5) and hypotensive, which was reported to be refractory to IV fluid resuscitation and was placed on norepinephrine.     - Overnight events: remains on norepinephrine gtt @ 10 mg/hr. U/O ~ 10 mL/hr. Had nausea this am, so did not eat much. On NDD1 diet. Started on heparin gtt in the interim for acute DVT in the L femoral vein.  Nurse reports that the patient's blood pressure improved with diuresis and has trended back down after loss of diuretic effect (suspicious for volume overload). [x] Nutrition: NDD1  [] BM today. ROS: Review of systems not obtained due to patient factors. Past Medical History:  History reviewed. No pertinent past medical history.      Allergy:  No Known Allergies       Current Facility-Administered Medications:     magnesium sulfate 1 g/100 ml IVPB (premix or compounded), 1 g, IntraVENous, ONCE, Zack Andrew MD, Last Rate: 100 mL/hr at 02/25/18 1402, 1 g at 02/25/18 1402    vancomycin (VANCOCIN) 750 mg in 0.9% sodium chloride (MBP/ADV) 250 mL ADV, 750 mg, IntraVENous, Q24H, Zack Andrew MD, Last Rate: 125 mL/hr at 02/25/18 1248, 750 mg at 02/25/18 1248    [START ON 2/26/2018] VANCOMYCIN INFORMATION NOTE, , Other, ONCE, Zack Andrew MD    ondansetron [facility] injection 4 mg, 4 mg, IntraVENous, Q6H PRN, Zack Andrew MD, 4 mg at 02/25/18 1022    heparin 25,000 units in D5W 250 ml infusion, 18-36 Units/kg/hr, IntraVENous, TITRATE, Nathanael Pack MD, Last Rate: 7.5 mL/hr at 02/25/18 0813, 13 Units/kg/hr at 02/25/18 0813    piperacillin-tazobactam (ZOSYN) 4.5 g in 0.9% sodium chloride (MBP/ADV) 100 mL MBP ###EXTENDED 4 HOUR INFUSION###, 4.5 g, IntraVENous, Q8H, Blessing Cardoza MD, 4.5 g at 02/25/18 1127    VANCOMYCIN INFORMATION NOTE 1 Each, 1 Each, Other, Rx Dosing/Monitoring, Zack Andrew MD    sodium chloride (NS) flush 5-10 mL, 5-10 mL, IntraVENous, PRN, Felipa Ramsey MD    levoFLOXacin (LEVAQUIN) 500 mg in D5W IVPB, 500 mg, IntraVENous, Q48H, Felipa Ramsey MD, Last Rate: 100 mL/hr at 02/24/18 1022, 500 mg at 02/24/18 1022    NOREPINephrine (LEVOPHED) 8 mg in 5% dextrose 250mL infusion, 2-16 mcg/min, IntraVENous, TITRATE, Felipa Ramsey MD, Last Rate: 18.8 mL/hr at 02/25/18 1127, 10 mcg/min at 02/25/18 1127    aspirin delayed-release tablet 81 mg, 81 mg, Oral, DAILY, Rody Willard NP, 81 mg at 18 0905    atorvastatin (LIPITOR) tablet 40 mg, 40 mg, Oral, DAILY, Katie Moser NP, 40 mg at 18 0905    sodium chloride (NS) flush 5-10 mL, 5-10 mL, IntraVENous, Q8H, Katie Moser NP, 10 mL at 18 1403    sodium chloride (NS) flush 5-10 mL, 5-10 mL, IntraVENous, PRN, Katie Moser NP    acetaminophen (TYLENOL) tablet 650 mg, 650 mg, Oral, Q6H PRN, Katie Moser NP, 650 mg at 187    [START ON 2018] levothyroxine (SYNTHROID) injection 37.5 mcg, 37.5 mcg, IntraVENous, Q24H, Cat Bhagat MD    pantoprazole (PROTONIX) 40 mg in sodium chloride 10 mL injection, 40 mg, IntraVENous, DAILY, Cecilio Clark MD, 40 mg at 18 0905      OBJECTIVE  Vital Signs:    Visit Vitals    /56    Pulse 87    Temp 98.1 °F (36.7 °C)    Resp 25    Ht 5' 5\" (1.651 m)    Wt 60.1 kg (132 lb 7.9 oz)    SpO2 100%    Breastfeeding No    BMI 22.05 kg/m2       O2 Device: Room air   O2 Flow Rate (L/min): 4 l/min   Temp (24hrs), Av.4 °F (36.9 °C), Min:97.7 °F (36.5 °C), Max:99.6 °F (37.6 °C)       PHYSICAL EXAM:   General: alert, awake and oriented to person, place. Cooperative. No acute distress. Head: atraumatic, normocephalic  Eye: PERRLA, EOM intact, no scleral icterus, no pallor, no cyanosis  Nose: Nares are patent. No polyps. No exudate. No sinus tenderness. Throat: No oral thrush. No exudate. Mucous membranes are moist. No tonsillar enlargement. Neck: supple, no thyromegaly, no JVD, no lymphadenopathy. Trachea midline  Lung: Symmetric in development and expansion. Good air entry. No crackles. No wheezes. Heart: Regular S1, S2 without murmur, rub or gallop. Abdomen: soft, nontender, nondistended. Normoactive bowel sounds. No rebound. No guarding. Bryant in situ with visibly turbid urine, yellow, opaque fluid.    Extremities: asymmetric LE edema (trace RLE, 2+ LLE), no cyanosis, no clubbing, 2+ peripheral pulses in DP  Lymphatic: no cervical/axillary/inguinal lymphadenopathy  Neurologic: Cranial nerves II-XII are grossly symmetric and physiologic. Babinski negative. No sensory deficit. No motor deficit. DTR [email or web address], 2+@LUE, 2+@RLE, 2+@LLE. No cerebellar signs. Gait was not assessed. Skin: No rash or lesion. DATA:   SvO2 55%    Intake/Output:   Last shift:      02/25 0701 - 02/25 1900  In: 298 [I.V.:298]  Out: 200 [Urine:200]    Last 3 shifts: 02/23 1901 - 02/25 0700  In: 4689.1 [I.V.:4689.1]  Out: 2560 [Urine:2560]      Intake/Output Summary (Last 24 hours) at 02/25/18 1403  Last data filed at 02/25/18 1300   Gross per 24 hour   Intake          1210.08 ml   Output             2465 ml   Net         -1254.92 ml       Last 3 Recorded Weights in this Encounter    02/24/18 0002 02/24/18 0400 02/25/18 0600   Weight: 59.7 kg (131 lb 9.8 oz) 58 kg (127 lb 13.9 oz) 60.1 kg (132 lb 7.9 oz)       Hemodynamics:   . @MAP     . @CVP       Lines: All central lines examined by me. No signs of erythema, induration, discharge. Central Venous Catheter:  Triple Lumen 02/22/18 Right Internal jugular (Active)   Central Line Being Utilized Yes 2/25/2018 12:00 PM   Criteria for Appropriate Use Hemodynamically unstable, requiring monitoring lines, vasopressors, or volume resuscitation 2/25/2018 12:00 PM   Site Assessment Clean, dry, & intact 2/25/2018 12:00 PM   Infiltration Assessment 0 2/25/2018 12:00 PM   Affected Extremity/Extremities Color distal to insertion site pink (or appropriate for race) 2/25/2018 12:00 PM   Date of Last Dressing Change 02/25/18 2/25/2018 12:00 PM   Dressing Status Clean, dry, & intact 2/25/2018 12:00 PM   Dressing Type Disk with Chlorhexadine gluconate (CHG); Transparent 2/25/2018 12:00 PM   Action Taken Open ports on tubing capped 2/25/2018 12:00 PM   Proximal Hub Color/Line Status Yellow; Infusing 2/25/2018 12:00 PM   Positive Blood Return (Medial Site) Yes 2/25/2018 12:00 PM   Medial Hub Color/Line Status Blue; Infusing 2/25/2018 12:00 PM   Positive Blood Return (Lateral Site) Yes 2/25/2018 12:00 PM   Distal Hub Color/Line Status Birder Jimmie; Infusing 2/25/2018 12:00 PM   Positive Blood Return (Site #3) Yes 2/25/2018 12:00 PM   Alcohol Cap Used Yes 2/25/2018 12:00 PM     Peripheral Intravenous Line:  Peripheral IV 02/22/18 Left Antecubital (Active)   Site Assessment Clean, dry, & intact 2/25/2018 12:00 PM   Phlebitis Assessment 0 2/25/2018 12:00 PM   Infiltration Assessment 0 2/25/2018 12:00 PM   Dressing Status Clean, dry, & intact 2/25/2018 12:00 PM   Dressing Type Transparent;Tape 2/25/2018 12:00 PM   Hub Color/Line Status Pink;Capped 2/25/2018 12:00 PM   Action Taken Open ports on tubing capped 2/25/2018 12:00 PM   Alcohol Cap Used Yes 2/25/2018 12:00 PM       Peripheral IV 02/22/18 Right Arm (Active)   Site Assessment Clean, dry, & intact 2/25/2018 12:00 PM   Phlebitis Assessment 0 2/25/2018 12:00 PM   Infiltration Assessment 0 2/25/2018 12:00 PM   Dressing Status Clean, dry, & intact 2/25/2018 12:00 PM   Dressing Type Transparent;Tape 2/25/2018 12:00 PM   Hub Color/Line Status Pink;Capped 2/25/2018 12:00 PM   Action Taken Open ports on tubing capped 2/25/2018 12:00 PM   Alcohol Cap Used Yes 2/25/2018 12:00 PM       Telemetry: [x]Sinus []A-flutter []Paced    []A-fib []Multiple PVCs     Imaging:  [x] I have personally reviewed the patients radiographs  [] Radiographs reviewed with radiologist  [x] No CXR study available for review today  [] No change from prior, tubes and lines are in adequate position  [] Improved   [] Worsening    CXR: (2/22) R basilar atelectasis. R IJ CVC with tip in the IVC noted. 2D-ECHO (2/22):  LVEF 45-50%; hypokinesis of the basal-mid anteroseptal, mid inferoseptal, apical inferior, and mid anterolateral wall(s); akinesis of the entire anterior, apical septal, apical lateral, and apical wall(s); wall thickness was mildly to moderately increased; grade 1 diastolic dysfunction; RV systolic function reduced; mild mitral regurgitation; moderate to severe aortic stenosis; mild tricuspid regurgitation; IVC dilated, respirophasic change in diameter was more than 50%. Labs:  Recent Results (from the past 24 hour(s))   POTASSIUM    Collection Time: 02/24/18  3:28 PM   Result Value Ref Range    Potassium 3.5 3.5 - 5.5 mmol/L   PTT    Collection Time: 02/25/18  5:20 AM   Result Value Ref Range    aPTT 136.9 (H) 23.0 - 36.4 SEC   CBC WITH AUTOMATED DIFF    Collection Time: 02/25/18  5:20 AM   Result Value Ref Range    WBC 21.4 (H) 4.6 - 13.2 K/uL    RBC 3.55 (L) 4.20 - 5.30 M/uL    HGB 9.8 (L) 12.0 - 16.0 g/dL    HCT 31.0 (L) 35.0 - 45.0 %    MCV 87.3 74.0 - 97.0 FL    MCH 27.6 24.0 - 34.0 PG    MCHC 31.6 31.0 - 37.0 g/dL    RDW 16.5 (H) 11.6 - 14.5 %    PLATELET 947 549 - 818 K/uL    MPV 14.0 (H) 9.2 - 11.8 FL    NEUTROPHILS 84 (H) 40 - 73 %    LYMPHOCYTES 8 (L) 21 - 52 %    MONOCYTES 4 3 - 10 %    EOSINOPHILS 4 0 - 5 %    BASOPHILS 0 0 - 2 %    ABS. NEUTROPHILS 18.1 (H) 1.8 - 8.0 K/UL    ABS. LYMPHOCYTES 1.7 0.9 - 3.6 K/UL    ABS. MONOCYTES 0.8 0.05 - 1.2 K/UL    ABS. EOSINOPHILS 0.8 (H) 0.0 - 0.4 K/UL    ABS. BASOPHILS 0.0 0.0 - 0.06 K/UL    DF AUTOMATED     METABOLIC PANEL, COMPREHENSIVE    Collection Time: 02/25/18  5:20 AM   Result Value Ref Range    Sodium 147 (H) 136 - 145 mmol/L    Potassium 3.9 3.5 - 5.5 mmol/L    Chloride 117 (H) 100 - 108 mmol/L    CO2 21 21 - 32 mmol/L    Anion gap 9 3.0 - 18 mmol/L    Glucose 78 74 - 99 mg/dL    BUN 16 7.0 - 18 MG/DL    Creatinine 0.91 0.6 - 1.3 MG/DL    BUN/Creatinine ratio 18 12 - 20      GFR est AA >60 >60 ml/min/1.73m2    GFR est non-AA 58 (L) >60 ml/min/1.73m2    Calcium 7.0 (L) 8.5 - 10.1 MG/DL    Bilirubin, total 0.3 0.2 - 1.0 MG/DL    ALT (SGPT) 16 13 - 56 U/L    AST (SGOT) 24 15 - 37 U/L    Alk.  phosphatase 129 (H) 45 - 117 U/L    Protein, total 4.6 (L) 6.4 - 8.2 g/dL    Albumin 1.4 (L) 3.4 - 5.0 g/dL    Globulin 3.2 2.0 - 4.0 g/dL    A-G Ratio 0.4 (L) 0.8 - 1.7     MAGNESIUM    Collection Time: 02/25/18  5:20 AM   Result Value Ref Range    Magnesium 1.7 1.6 - 2.6 mg/dL   PHOSPHORUS    Collection Time: 02/25/18  5:20 AM   Result Value Ref Range    Phosphorus 2.2 (L) 2.5 - 4.9 MG/DL           Recent Labs      02/22/18   1825   FIO2I  0.32       Recent Labs      02/25/18   0520  02/24/18   0400  02/23/18   0300   WBC  21.4*  27.5*  18.1*   HGB  9.8*  9.7*  9.4*   HCT  31.0*  30.7*  29.6*   PLT  230  233  246     Recent Labs      02/25/18   0520  02/24/18   1528  02/24/18   0400   02/23/18   0300  02/22/18   1638   NA  147*   --   146*   --   146*   --    K  3.9  3.5  3.8   < >  3.7   --    CL  117*   --   119*   --   115*   --    CO2  21   --   17*   --   18*   --    GLU  78   --   106*   --   114*   --    BUN  16   --   21*   --   26*   --    CREA  0.91   --   0.92   --   1.06   --    CA  7.0*   --   7.2*   --   7.5*   --    MG  1.7   --   2.0   --    --   2.4   PHOS  2.2*   --   2.6   --    --   3.6   ALB  1.4*   --   1.5*   --   1.5*   --    SGOT  24   --   25   --   24   --    ALT  16   --   15   --   13   --     < > = values in this interval not displayed. No results for input(s): PH, PCO2, PO2, HCO3, FIO2 in the last 72 hours.     Lab Results   Component Value Date/Time    Color DARK YELLOW 02/22/2018 06:09 PM    Appearance TURBID 02/22/2018 06:09 PM    Specific gravity 1.030 02/22/2018 06:09 PM    pH (UA) 5.0 02/22/2018 06:09 PM    Protein 30 (A) 02/22/2018 06:09 PM    Glucose NEGATIVE  02/22/2018 06:09 PM    Ketone NEGATIVE  02/22/2018 06:09 PM    Bilirubin NEGATIVE  02/22/2018 06:09 PM    Urobilinogen 0.2 02/22/2018 06:09 PM    Nitrites NEGATIVE  02/22/2018 06:09 PM    Leukocyte Esterase NEGATIVE  02/22/2018 06:09 PM    Epithelial cells FEW 02/22/2018 06:09 PM    Bacteria 3+ (A) 02/22/2018 06:09 PM    WBC 2 to 4 02/22/2018 06:09 PM    RBC 0 02/22/2018 06:09 PM       Cultures:   Blood Culture: N/A  Urine Culture: (2/22) NGTD  Sputum culture: N/A  Urine Legionella and Pneumococcal Ag: N/A  C. Diff: N/A        ASSESSMENT/PLAN:   Principal Problem:    Shock ([facility] [address] (2/22/2018)      Overview: Urine may be a source of infection for possible septic shock; however,       SvO2 should be checked in light of abnormal troponin and pro-BNP,       suspicious for cardiogenic shock    Active Problems:    KILO (acute kidney injury) ([address] (2/22/2018)      Acute cystitis without hematuria (2/22/2018)      Elevated troponin (2/22/2018)      Elevated brain natriuretic peptide (BNP) level (2/22/2018)      Acquired hypothyroidism (2/22/2018)      Dementia with behavioral disturbance (2/22/2018)      Supraventricular tachycardia ([address] (2/22/2018)      Acute deep vein thrombosis (DVT) of femoral vein of left lower extremity ([facility] [address] (2/23/2018)      Wean norepinephrine as tolerated. On empiric therapy for urinary tract infection with Zosyn/levofloxacin/vancomycin for now. Furosemide 40 mg IV (single dose) again today  Glucose control: glucose stabilization per ICU protocol  Malpositioned CVC noted. No need to attempt to reposition at this time. CXR in am.    NUTRITION: NDD1. ICU electrolyte replacement protocol    PROPHYLAXIS:  DVT prophylaxis: heparin gtt  GI prophylaxis: Protonix  HOB 30-degree elevation  Chlorhexidine mouth washes    CODE STATUS: FULL CODE    Further recommendations will be based on the patient's response to recommended treatment and results of the investigation ordered. DISPOSITION:    The patient is: [x] acutely ill Risk of deterioration: [] moderate    [] critically ill  [x] high     [x]See my orders for details    My assessment, plan of care, findings, medications, side effects etc were discussed with:  [x] Nurse [] PT/OT    [x] Respiratory therapy [] DrEscobar    [] Family [] Patient: answered all questions to satisfaction   [] Pharmacist []      [x] Case & management strategies discussed today on multidisciplinary rounds    During this entire length of time I was immediately available to the patient.  The reason for providing this level of medical care for this critically ill patient was due a critical illness that impaired one or more vital organ systems such that there was a high probability of imminent or life threatening deterioration in the patients condition. This care involved high complexity decision making to assess, manipulate, and support vital system functions, to treat this degreee vital organ system failure and to prevent further life threatening deterioration of the patients condition    []Total critical care time spent on reviewing the case/medical record/data/notes/EMR/patient examination/documentation/coordinating care with nurse/consultants, exclusive of procedures - minutes with complex decision making performed and > 50% time spent in face to face evaluation.         Chris Jordan MD   2/25/2018

## 2018-02-25 NOTE — PROGRESS NOTES
Progress Note      Patient: Mariana Reese               Sex: female          DOA: 2/22/2018       YOB: 1928      Age:  80 y.o.        LOS:  LOS: 2 days               Subjective:   Discussed with dr Edwina Torres . 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 Further workup is not appropriate as she would not be a candidate for intervention if indeed we did find a mass etc . Continue treating the dvt with heparin for now .       Objective:      Visit Vitals    /63    Pulse 93    Temp 99.6 °F (37.6 °C)    Resp (!) 42    Ht 5' 5\" (1.651 m)    Wt 58 kg (127 lb 13.9 oz)    SpO2 100%    Breastfeeding No    BMI 21.28 kg/m2       Physical Exam:  Pt is sleeping wit her toy cat   Heart reg rate and rhythm   Lungs fair breath sounds heard   Abdomen soft ad no pain on palpation   Neuro nonfocal         Lab/Data Reviewed:  BMP:   Lab Results   Component Value Date/Time     (H) 02/24/2018 04:00 AM    K 3.5 02/24/2018 03:28 PM     (H) 02/24/2018 04:00 AM    CO2 17 (L) 02/24/2018 04:00 AM    AGAP 10 02/24/2018 04:00 AM     (H) 02/24/2018 04:00 AM    BUN 21 (H) 02/24/2018 04:00 AM    CREA 0.92 02/24/2018 04:00 AM    GFRAA >60 02/24/2018 04:00 AM    GFRNA 57 (L) 02/24/2018 04:00 AM     CMP:   Lab Results   Component Value Date/Time     (H) 02/24/2018 04:00 AM    K 3.5 02/24/2018 03:28 PM     (H) 02/24/2018 04:00 AM    CO2 17 (L) 02/24/2018 04:00 AM    AGAP 10 02/24/2018 04:00 AM     (H) 02/24/2018 04:00 AM    BUN 21 (H) 02/24/2018 04:00 AM    CREA 0.92 02/24/2018 04:00 AM    GFRAA >60 02/24/2018 04:00 AM    GFRNA 57 (L) 02/24/2018 04:00 AM    CA 7.2 (L) 02/24/2018 04:00 AM    MG 2.0 02/24/2018 04:00 AM    PHOS 2.6 02/24/2018 04:00 AM    ALB 1.5 (L) 02/24/2018 04:00 AM    TP 4.8 (L) 02/24/2018 04:00 AM    GLOB 3.3 02/24/2018 04:00 AM    AGRAT 0.5 (L) 02/24/2018 04:00 AM    SGOT 25 02/24/2018 04:00 AM    ALT 15 02/24/2018 04:00 AM     CBC:   Lab Results Component Value Date/Time    WBC 27.5 (H) 02/24/2018 04:00 AM    HGB 9.7 (L) 02/24/2018 04:00 AM    HCT 30.7 (L) 02/24/2018 04:00 AM     02/24/2018 04:00 AM           Assessment/Plan     Principal Problem:    Shock ([address] (2/22/2018)      Overview: Urine may be a source of infection for possible septic shock; however,       SvO2 should be checked in light of abnormal troponin and pro-BNP,       suspicious for cardiogenic shock    Active Problems:    Acquired hypothyroidism (2/22/2018)      KILO (acute kidney injury) ([address] (2/22/2018)      Elevated troponin (2/22/2018)      Acute cystitis without hematuria (2/22/2018)      Dementia with behavioral disturbance (2/22/2018)      Elevated brain natriuretic peptide (BNP) level (2/22/2018)      Supraventricular tachycardia ([address] (2/22/2018)      Acute deep vein thrombosis (DVT) of femoral vein of left lower extremity ([address] (2/23/2018)        Plan:continue the iv heparin for now .

## 2018-02-25 NOTE — PROGRESS NOTES
Physical Exam   Skin:             Primary Nurse Cleotha Runner and Kaci Ponce RN performed a dual skin assessment on this patient Impairment noted- see wound doc flow sheet

## 2018-02-25 NOTE — PROGRESS NOTES
Problem: Falls - Risk of  Goal: *Absence of Falls  Document Bahman Fall Risk and appropriate interventions in the flowsheet.    Outcome: Progressing Towards Goal  Fall Risk Interventions:  Mobility Interventions: Bed/chair exit alarm, Assess mobility with egress test, Communicate number of staff needed for ambulation/transfer, OT consult for ADLs, Patient to call before getting OOB, PT Consult for mobility concerns, PT Consult for assist device competence, Strengthening exercises (ROM-active/passive)    Mentation Interventions: Bed/chair exit alarm, Adequate sleep, hydration, pain control, Door open when patient unattended, Evaluate medications/consider consulting pharmacy, Family/sitter at bedside, Increase mobility, More frequent rounding, Reorient patient, Room close to nurse's station, Toileting rounds, Update white board    Medication Interventions: Bed/chair exit alarm, Evaluate medications/consider consulting pharmacy, Patient to call before getting OOB, Teach patient to arise slowly    Elimination Interventions: Bed/chair exit alarm, Call light in reach, Patient to call for help with toileting needs, Toilet paper/wipes in reach, Toileting schedule/hourly rounds

## 2018-02-25 NOTE — PROGRESS NOTES
0700: Bedside shift change report given to Carmelita Castillo RN (oncoming nurse) by Halie Fernandez RN (offgoing nurse). Report included the following information SBAR, Kardex, MAR and Recent Results. 0900: K is 3.9- 10meq ordered    0950: Daughter in law called, was not able to give any information over phone due to no patient code. 1500: Gave a dose of lasix-to see if it help with weaning off of Levophed. 1800: Turned levophed off. BP holding for now. 1900: Bedside shift change report given to Adithya Frankel RN (oncoming nurse) by Carmelita Castillo RN (offgoing nurse). Report included the following information SBAR, Kardex, MAR and Med Rec Status.

## 2018-02-25 NOTE — PROGRESS NOTES
Physical Exam   Skin:             Primary Nurse Geoffrey Fan, DAYANNA and BHARAT Wong RN performed a dual skin assessment on this patient Impairment noted- see wound doc flow sheet

## 2018-02-25 NOTE — PROGRESS NOTES
attempted to conduct a follow-up consultation and spiritual assessment for Luzmaria Celestin, who is a 80 y. o.female. However, patient was asleep, with no family at bedside. Patients primary language is: Lakia Reynoso. According to the patients EMR, her [employer] affiliation is: [place]. The  provided the following interventions:  Continued our relationship of care and support, leaving patient a card with a personal note. Offered silent prayer and, in the note, assurance of continued prayers on patient's behalf. Reviewed chart. Plan:  Chaplains will continue our attempts to connect with patient and then provide pastoral care on an as-needed/requested basis.     [facility] Certified [address] [place] [address],[address] [place] [address]    [phone]

## 2018-02-25 NOTE — ROUTINE PROCESS
1940:  [address]  from BHARAT Wong RN. SBAR reviewed with two-nurse check-offs done of meds, dressings, wounds, LDA's & revelant assessment findings. Tonight:  Trying to rest, c/o feeling cold. Usually oriented just to self. Comfortable on RA. Pressor down, then back up. Diuresing. 0710:  Verbal Patient-side shift change report given to BHARAT Wong RN, (oncoming nurse) by Shorty Dixon RN  (offgoing nurse). Report included the following information SBAR, Kardex, ED Summary, Procedure Summary, Intake/Output, MAR, Recent Results and Med Rec Status. Included:  Intro, hx, two-RN eval of relevant assessment findings, LDAs, skin, diagnostics and infusions.

## 2018-02-26 NOTE — PROGRESS NOTES
Problem: Falls - Risk of  Goal: *Absence of Falls  Document Bahman Fall Risk and appropriate interventions in the flowsheet.    Outcome: Progressing Towards Goal  Fall Risk Interventions:  Mobility Interventions: Assess mobility with egress test, Bed/chair exit alarm, Communicate number of staff needed for ambulation/transfer    Mentation Interventions: Adequate sleep, hydration, pain control, Bed/chair exit alarm, More frequent rounding, Increase mobility    Medication Interventions: Assess postural VS orthostatic hypotension, Bed/chair exit alarm, Evaluate medications/consider consulting pharmacy    Elimination Interventions: Call light in reach, Bed/chair exit alarm, Patient to call for help with toileting needs

## 2018-02-26 NOTE — PROGRESS NOTES
PCCM Progress Note                                              I have reviewed the flowsheet and previous days notes. Events, vitals, medications and notes from last 24 hours reviewed. Care plan discussed with staff and on multidisciplinary rounds. Subjective:  2/26/2018       Impression and Plan  Shock - likely etiology is cardiac. EV 1000 placed. SVV is 9 indicating more fluid is not needed at this time. CI is 1.6. Titrate Levo with MAP goal >65 mmHg. Pt is on levophed which is being tapered down. Sepsis - WBC count is trending down. Last LA was 1 on 2/22/18. Blood and urine cx show no growth  Poss RLL pneumonia - Pt is on Zosyn, Levaquin, vanco. Defer abx to ID  Systolic CHF with EF 96-96% with B/l Pleural effusions Rt>Lt - Pt needs diuresis once she is off pressors. F/u with Cardiology  Left leg DVT - on heparin drip per protocol  Mildly elevated TI - Cardiology consulted and they think its related to tress cardiomyopathy. Pt is on ASA, Lipitor, Defer further mgt to cardiology  DVT and GI proph - Pt is on Heparin and protonix    OTHER:  Glycemic Control. Glucose stabilizer per ICU protocol when on insulin drip. Maintain blood glucose 140-180. Replace electrolytes per ICU electrolyte replacement protocol  HOB >=30 degree elevation all the time. Aggressive pulmonary toileting. Incentive spirometry when appropriate. Aspiration precautions. Vasopressor when appropriate    Central Line bundle followed, remove when not needed. Large bore IV line or CVP when appropriate. Felix bundle followed, remove felix catheter when not critically ill. PT/OT eval and treat. OOB/IS when appropriate. Quality Care: Stress ulcer prophylaxis, DVT prophylaxis, HOB elevated, Infection control all reviewed and addressed. Events and notes from last 24 hours reviewed. Care plan discussed with nursing. D/w patient above medical problems and answered all questions to their satisfaction.      CC TIME: >45 min     Medication Reviewed:    No Known Allergies   History reviewed. No pertinent past medical history. History reviewed. No pertinent surgical history. Social History   Substance Use Topics    Smoking status: Not on file    Smokeless tobacco: Not on file    Alcohol use Not on file      Family History   Problem Relation Age of Onset    Family history unknown: Yes      Prior to Admission medications    Medication Sig Start Date End Date Taking? Authorizing Provider   aspirin delayed-release 81 mg tablet Take 81 mg by mouth daily. Historical Provider   atorvastatin (LIPITOR) 40 mg tablet Take 40 mg by mouth daily. Historical Provider   levothyroxine (SYNTHROID) 75 mcg tablet Take 75 mcg by mouth Daily (before breakfast). Historical Provider   MAGNESIUM OXIDE, BULK, Take 400 mg by mouth daily. Historical Provider   polyethylene glycol (MIRALAX) 17 gram packet Take 17 g by mouth daily. Historical Provider   MULTIVIT-MINERALS/FERROUS FUM (MULTI VITAMIN PO) Take 1 Tab by mouth daily. Historical Provider   memantine (NAMENDA) 10 mg tablet Take 21 mg by mouth daily. 2/23/18 3/8/18  Historical Provider   memantine (NAMENDA) 10 mg tablet Take 28 mg by mouth daily. 3/9/18   Historical Provider   calcium-vitamin D (OYSTER SHELL) 500 mg(1,250mg) -200 unit per tablet Take 1 Tab by mouth daily. Historical Provider   mirtazapine (REMERON) 7.5 mg tablet Take 7.5 mg by mouth nightly.     Historical Provider     Current Facility-Administered Medications   Medication Dose Route Frequency    albumin human 5% (BUMINATE) solution 12.5 g  12.5 g IntraVENous ONCE    vancomycin (VANCOCIN) 1,000 mg in 0.9% sodium chloride (MBP/ADV) 250 mL  1,000 mg IntraVENous Q12H    [START ON 2/27/2018] VANCOMYCIN INFORMATION NOTE   Other ONCE    heparin 25,000 units in D5W 250 ml infusion  18-36 Units/kg/hr IntraVENous TITRATE    piperacillin-tazobactam (ZOSYN) 4.5 g in 0.9% sodium chloride (MBP/ADV) 100 mL MBP ###EXTENDED 4 HOUR INFUSION###  4.5 g IntraVENous Q8H    VANCOMYCIN INFORMATION NOTE 1 Each  1 Each Other Rx Dosing/Monitoring    levoFLOXacin (LEVAQUIN) 500 mg in D5W IVPB  500 mg IntraVENous Q48H    NOREPINephrine (LEVOPHED) 8 mg in 5% dextrose 250mL infusion  2-16 mcg/min IntraVENous TITRATE    aspirin delayed-release tablet 81 mg  81 mg Oral DAILY    atorvastatin (LIPITOR) tablet 40 mg  40 mg Oral DAILY    sodium chloride (NS) flush 5-10 mL  5-10 mL IntraVENous Q8H    levothyroxine (SYNTHROID) injection 37.5 mcg  37.5 mcg IntraVENous Q24H    pantoprazole (PROTONIX) 40 mg in sodium chloride 10 mL injection  40 mg IntraVENous DAILY       Lines: All central lines examined by me. No signs of erythema, induration, discharge. Central Venous Catheter:  Triple Lumen 02/22/18 Right Internal jugular (Active)   Central Line Being Utilized Yes 2/26/2018 12:00 PM   Criteria for Appropriate Use Hemodynamically unstable, requiring monitoring lines, vasopressors, or volume resuscitation 2/26/2018 12:00 PM   Site Assessment Clean, dry, & intact 2/26/2018 12:00 PM   Infiltration Assessment 0 2/26/2018 12:00 PM   Affected Extremity/Extremities Color distal to insertion site pink (or appropriate for race); Pulses palpable;Range of motion performed 2/26/2018 12:00 PM   Date of Last Dressing Change 02/25/18 2/26/2018 12:00 PM   Dressing Status Clean, dry, & intact 2/26/2018 12:00 PM   Dressing Type Disk with Chlorhexadine gluconate (CHG) 2/26/2018 12:00 PM   Action Taken Open ports on tubing capped 2/26/2018 12:00 PM   Proximal Hub Color/Line Status White; Infusing 2/26/2018 12:00 PM   Positive Blood Return (Medial Site) Yes 2/26/2018 12:00 PM   Medial Hub Color/Line Status Blue; Infusing 2/26/2018 12:00 PM   Positive Blood Return (Lateral Site) Yes 2/26/2018 12:00 PM   Distal Hub Color/Line Status Brown; Infusing 2/26/2018 12:00 PM   Positive Blood Return (Site #3) Yes 2/26/2018 12:00 PM   Alcohol Cap Used Yes 2/26/2018 12:00 PM Objective:  Vital Signs:    Visit Vitals    /57    Pulse 83    Temp 97.7 °F (36.5 °C)    Resp 21    Ht 5' 5\" (1.651 m)    Wt 54.7 kg (120 lb 9.5 oz)    SpO2 100%    Breastfeeding No    BMI 20.07 kg/m2      O2 Device: Room air  O2 Flow Rate (L/min): 4 l/min  Temp (24hrs), Av °F (36.7 °C), Min:97.4 °F (36.3 °C), Max:98.7 °F (37.1 °C)      Intake/Output:   Last shift:       07 - 1900  In: 315.2 [I.V.:315.2]  Out: 40 [Urine:40]    Last 3 shifts: 1901 -  0700  In: 1598.8 [I.V.:1598.8]  Out: 3075 [Urine:3075]      Intake/Output Summary (Last 24 hours) at 18 1440  Last data filed at 18 1400   Gross per 24 hour   Intake          1104.31 ml   Output             1715 ml   Net          -610.69 ml       Last 3 Recorded Weights in this Encounter    18 0400 18 0600 18 0800   Weight: 58 kg (127 lb 13.9 oz) 60.1 kg (132 lb 7.9 oz) 54.7 kg (120 lb 9.5 oz)     Physical Exam:     General/Neurology: Alert, Awake,   Head:   Normocephalic, without obvious abnormality  Eye:   PERRL, EOM intact, no scleral icterus, no pallor  Oral:   Mucus membranes moist  Neck:   Supple, No lymphadenopathy  Lung:   B/l decreased air entry, b/l basal rales present  Heart:   Regular rate & rhythm. S1 S2 present. Abdomen/: Soft. NT. ND. +BS.     Extremities:  2-3+ pedal edema  Skin:   No rashes or lesions    Data:      Recent Results (from the past 24 hour(s))   PTT    Collection Time: 18  3:48 PM   Result Value Ref Range    aPTT 117.6 (H) 23.0 - 36.4 SEC   PTT    Collection Time: 18 10:30 PM   Result Value Ref Range    aPTT 124.2 (H) 23.0 - 36.4 SEC   POTASSIUM    Collection Time: 18 10:30 PM   Result Value Ref Range    Potassium 3.7 3.5 - 5.5 mmol/L   MAGNESIUM    Collection Time: 18 10:30 PM   Result Value Ref Range    Magnesium 2.0 1.6 - 2.6 mg/dL   Peyman Chatman    Collection Time: 18 10:30 PM   Result Value Ref Range    Vancomycin,trough 17.6 10.0 - 20.0 ug/mL    Reported dose date: 20180225      Reported dose time: 1200      Reported dose: 750 MG UNITS   PTT    Collection Time: 02/26/18  5:15 AM   Result Value Ref Range    aPTT 112.0 (H) 23.0 - 36.4 SEC   CBC WITH AUTOMATED DIFF    Collection Time: 02/26/18  5:15 AM   Result Value Ref Range    WBC 15.3 (H) 4.6 - 13.2 K/uL    RBC 3.32 (L) 4.20 - 5.30 M/uL    HGB 9.3 (L) 12.0 - 16.0 g/dL    HCT 28.5 (L) 35.0 - 45.0 %    MCV 85.8 74.0 - 97.0 FL    MCH 28.0 24.0 - 34.0 PG    MCHC 32.6 31.0 - 37.0 g/dL    RDW 16.6 (H) 11.6 - 14.5 %    PLATELET 663 182 - 653 K/uL    MPV 11.5 9.2 - 11.8 FL    NEUTROPHILS 72 42 - 75 %    LYMPHOCYTES 16 (L) 20 - 51 %    MONOCYTES 6 2 - 9 %    EOSINOPHILS 5 0 - 5 %    BASOPHILS 0 0 - 3 %    MYELOCYTES 1 (H) 0 %    ABS. NEUTROPHILS 11.0 (H) 1.8 - 8.0 K/UL    ABS. LYMPHOCYTES 2.4 0.8 - 3.5 K/UL    ABS. MONOCYTES 0.9 0 - 1.0 K/UL    ABS. EOSINOPHILS 0.8 (H) 0.0 - 0.4 K/UL    ABS. BASOPHILS 0.0 0.0 - 0.1 K/UL    RBC COMMENTS ANISOCYTOSIS  1+        DF MANUAL     METABOLIC PANEL, COMPREHENSIVE    Collection Time: 02/26/18  5:15 AM   Result Value Ref Range    Sodium 147 (H) 136 - 145 mmol/L    Potassium 3.7 3.5 - 5.5 mmol/L    Chloride 117 (H) 100 - 108 mmol/L    CO2 22 21 - 32 mmol/L    Anion gap 8 3.0 - 18 mmol/L    Glucose 107 (H) 74 - 99 mg/dL    BUN 16 7.0 - 18 MG/DL    Creatinine 0.77 0.6 - 1.3 MG/DL    BUN/Creatinine ratio 21 (H) 12 - 20      GFR est AA >60 >60 ml/min/1.73m2    GFR est non-AA >60 >60 ml/min/1.73m2    Calcium 7.2 (L) 8.5 - 10.1 MG/DL    Bilirubin, total 0.3 0.2 - 1.0 MG/DL    ALT (SGPT) 17 13 - 56 U/L    AST (SGOT) 25 15 - 37 U/L    Alk.  phosphatase 165 (H) 45 - 117 U/L    Protein, total 4.8 (L) 6.4 - 8.2 g/dL    Albumin 1.4 (L) 3.4 - 5.0 g/dL    Globulin 3.4 2.0 - 4.0 g/dL    A-G Ratio 0.4 (L) 0.8 - 1.7     PHOSPHORUS    Collection Time: 02/26/18  5:15 AM   Result Value Ref Range    Phosphorus 2.4 (L) 2.5 - 4.9 MG/DL   VANCOMYCIN, TROUGH    Collection Time: 02/26/18 10:18 AM   Result Value Ref Range    Vancomycin,trough 19.8 10.0 - 20.0 ug/mL    Reported dose date: UNKNOWN      Reported dose time: UNKNOWN      Reported dose: UNKNOWN UNITS   PTT    Collection Time: 02/26/18 11:15 AM   Result Value Ref Range    aPTT 58.2 (H) 23.0 - 36.4 SEC         Chemistry Recent Labs      02/26/18   0515  02/25/18   2230  02/25/18   1337  02/25/18   0520   02/24/18   0400   GLU  107*   --    --   78   --   106*   NA  147*   --    --   147*   --   146*   K  3.7  3.7  3.7  3.9   < >  3.8   CL  117*   --    --   117*   --   119*   CO2  22   --    --   21   --   17*   BUN  16   --    --   16   --   21*   CREA  0.77   --    --   0.91   --   0.92   CA  7.2*   --    --   7.0*   --   7.2*   MG   --   2.0   --   1.7   --   2.0   PHOS  2.4*   --    --   2.2*   --   2.6   AGAP  8   --    --   9   --   10   BUCR  21*   --    --   18   --   23*   AP  165*   --    --   129*   --   128*   TP  4.8*   --    --   4.6*   --   4.8*   ALB  1.4*   --    --   1.4*   --   1.5*   GLOB  3.4   --    --   3.2   --   3.3   AGRAT  0.4*   --    --   0.4*   --   0.5*    < > = values in this interval not displayed. CBC w/Diff Recent Labs      02/26/18 0515 02/25/18   0520 02/24/18   0400   WBC  15.3*  21.4*  27.5*   RBC  3.32*  3.55*  3.51*   HGB  9.3*  9.8*  9.7*   HCT  28.5*  31.0*  30.7*   PLT  212  230  233   GRANS  72  84*   --    LYMPH  16*  8*   --    EOS  5  4   --      XR (Most Recent). CXR reviewed by me and compared with previous CXR   Results from Hospital Encounter encounter on 02/22/18   XR CHEST PORT   Narrative Chest, single view    Indication: Dyspnea    Comparison: February 24, 2018    Findings:  Portable upright AP view of the chest was obtained. Right IJ  approach central venous catheter remains unchanged with the tip resting over the  inferior vena cava near the cavoatrial junction.  The cardiomediastinal  silhouette is unchanged and remains obscured by bibasilar airspace opacities,  layering effusions and atelectasis, worse on the right. There is slight improved  aeration of the lung apices. Impression Impression:  Slight improved aeration of the lung apices suggesting decreased interstitial  edema. Otherwise, little interval change in the large right and at least small  left pleural effusions with associated atelectasis and possible superimposed  pneumonia. High complexity decision making was performed during the evaluation of this patient at high risk for decompensation with multiple organ involvement     Above mentioned total time spent on reviewing the case/medical record/data/notes/EMR/patient examination/documentation/coordinating care with nurse/consultants, exclusive of procedures with complex decision making performed and > 50% time spent in face to face evaluation.     Elex Rinne, MD  2/26/2018

## 2018-02-26 NOTE — PROGRESS NOTES
Called pt's daughter's 2 #'s. No answer. VM box full. Unable to leave message. Spoke with Milton Cotto, pt's bedside nurse at [facility]. Milton Cotto states pt's daughter has been in the hospital for a long time. She states pt's niece, Carlos A Villa is the person who admitted pt into [facility] 2 weeks ago. Milton Cotto states pt is on their dementia unit and requires total care. Pt is non ambulatory although she was working with PT at [facility] through [address]. Per Milton Cotto, staff will need to come to hospital to evaluate pt before pt can return to [facility]. Currently, pt remains on pressor support. Discharge plan: Return to [facility] vs SNF. Will need to speak to pt's niece.

## 2018-02-26 NOTE — PROGRESS NOTES
Medical Progress Note      NAME: Mariana Reese   :  3/9/1928  MRM:  [id number]    Date/Time: 2018  1:46 PM         Subjective:     Dementia. Drowsy notes stuffed cat    Past Medical History reviewed and unchanged from Admission History and Physical    Review of Systems   Unable to perform ROS: Dementia            Objective:     Still requiring pressors. PCC onboard    Vitals:      Last 24hrs VS reviewed since prior progress note. Most recent are:    Visit Vitals    /48    Pulse 78    Temp 97.6 °F (36.4 °C)    Resp 17    Ht 5' 5\" (1.651 m)    Wt 54.7 kg (120 lb 9.5 oz)    SpO2 100%    Breastfeeding No    BMI 20.07 kg/m2     SpO2 Readings from Last 6 Encounters:   18 100%   18 94%   18 95%   18 (!) 72%   02/15/18 98%    O2 Flow Rate (L/min): 4 l/min     Intake/Output Summary (Last 24 hours) at 18 1346  Last data filed at 18 0755   Gross per 24 hour   Intake           815.46 ml   Output             1875 ml   Net         -1059.54 ml          Exam:      Physical Exam   Constitutional: No distress. HENT:   Head: Normocephalic. Neck: Normal range of motion. Cardiovascular: Normal rate. Pulmonary/Chest: Effort normal and breath sounds normal.   Abdominal: Soft. Bowel sounds are normal.   Musculoskeletal: She exhibits edema (LLE). Neurological:   drowsy   Skin: She is not diaphoretic. There is erythema (to bottow).          Medications:  Current Facility-Administered Medications   Medication Dose Route Frequency    albumin human 5% (BUMINATE) solution 12.5 g  12.5 g IntraVENous ONCE    vancomycin (VANCOCIN) 1,000 mg in 0.9% sodium chloride (MBP/ADV) 250 mL  1,000 mg IntraVENous Q12H    [START ON 2018] VANCOMYCIN INFORMATION NOTE   Other ONCE    ondansetron (ZOFRAN) injection 4 mg  4 mg IntraVENous Q6H PRN    heparin 25,000 units in D5W 250 ml infusion  18-36 Units/kg/hr IntraVENous TITRATE    piperacillin-tazobactam (ZOSYN) 4.5 g in 0.9% sodium chloride (MBP/ADV) 100 mL MBP ###EXTENDED 4 HOUR INFUSION###  4.5 g IntraVENous Q8H    VANCOMYCIN INFORMATION NOTE 1 Each  1 Each Other Rx Dosing/Monitoring    sodium chloride (NS) flush 5-10 mL  5-10 mL IntraVENous PRN    levoFLOXacin (LEVAQUIN) 500 mg in D5W IVPB  500 mg IntraVENous Q48H    NOREPINephrine (LEVOPHED) 8 mg in 5% dextrose 250mL infusion  2-16 mcg/min IntraVENous TITRATE    aspirin delayed-release tablet 81 mg  81 mg Oral DAILY    atorvastatin (LIPITOR) tablet 40 mg  40 mg Oral DAILY    sodium chloride (NS) flush 5-10 mL  5-10 mL IntraVENous Q8H    sodium chloride (NS) flush 5-10 mL  5-10 mL IntraVENous PRN    acetaminophen (TYLENOL) tablet 650 mg  650 mg Oral Q6H PRN    levothyroxine (SYNTHROID) injection 37.5 mcg  37.5 mcg IntraVENous Q24H    pantoprazole (PROTONIX) 40 mg in sodium chloride 10 mL injection  40 mg IntraVENous DAILY       ______________________________________________________________________      Lab Review:     Recent Labs      02/26/18   0515  02/25/18   0520  02/24/18   0400   WBC  15.3*  21.4*  27.5*   HGB  9.3*  9.8*  9.7*   HCT  28.5*  31.0*  30.7*   PLT  212  230  233     Recent Labs      02/26/18   0515  02/25/18   2230  02/25/18   1337  02/25/18   0520   02/24/18   0400   NA  147*   --    --   147*   --   146*   K  3.7  3.7  3.7  3.9   < >  3.8   CL  117*   --    --   117*   --   119*   CO2  22   --    --   21   --   17*   GLU  107*   --    --   78   --   106*   BUN  16   --    --   16   --   21*   CREA  0.77   --    --   0.91   --   0.92   CA  7.2*   --    --   7.0*   --   7.2*   MG   --   2.0   --   1.7   --   2.0   PHOS  2.4*   --    --   2.2*   --   2.6   ALB  1.4*   --    --   1.4*   --   1.5*   SGOT  25   --    --   24   --   25   ALT  17   --    --   16   --   15    < > = values in this interval not displayed. No components found for: GLPOC  No results for input(s): PH, PCO2, PO2, HCO3, FIO2 in the last 72 hours.   No results for input(s): INR in the last 72 hours. No lab exists for component: INREXT, INREXT             Assessment:     Principal Problem:    Shock ([address] (2/22/2018)      Overview: Urine may be a source of infection for possible septic shock; however,       SvO2 should be checked in light of abnormal troponin and pro-BNP,       suspicious for cardiogenic shock    Active Problems:    Acquired hypothyroidism (2/22/2018)      KILO (acute kidney injury) ([address] (2/22/2018)      Elevated troponin (2/22/2018)      Acute cystitis without hematuria (2/22/2018)      Dementia with behavioral disturbance (2/22/2018)      Elevated brain natriuretic peptide (BNP) level (2/22/2018)      Supraventricular tachycardia ([address] (2/22/2018)      Acute deep vein thrombosis (DVT) of femoral vein of left lower extremity ([address] (2/23/2018)      Underweight (2/25/2018)           Plan:     Sepsis shock   -still on pressors  -unclear source  -blood cultures NGTD  -broad spectum abx for now  -lactic elevated likely 2/2 hypotension  -procal elevated  -Check cortisol level  -UA negative here however apparently per notes PCCM concern for UTI as source?  -Chest x-ray essential unremarkable for pneumonia  -non hypoxic  -do not think meningitis however unclear how baseline    CMO  -noted Cards believes likely Takastubos  -noting troponings had essentially flatteded  -ECHO noted 2/22/18 noting EF 45-50% with hypokenies of basal mid anteroseptal, mid inferoseptal, apical interior and mid anterolateral walls. Akinesis of the entire anterior, apical septa, apical lateral and apical walls, mildly to moderate increased LV wall Thickness. Grade 1 diastolic dysfunction. Reduced RV systolic function.  Mild mitral regurgitation mild tricuspid regurg with reduced RV systolic Function, and mild mitral regur and dilated IVC  -also notted Moderate to severe AS with valve mean gradient 36mmgh    DVT LLE  -heparin gtt     KILO  -improved  -marginally elevaetd  -monitor    Hypernatermia hypernatreamia  -noted net positive 7L  -monitor was given diuretics in ICU yesterday     Elevated troponin  -no cp  -EKG shows no ST elevation  -likely demand ischemia noted Cards seen noting likely Takasubtbos     Hypothyroidism  -TSH  -synthyroid  -switch to IV     DVT prophylaxis  -heparin gtt       ___________________________________________________    Attending Physician: Will Tinajero, NP

## 2018-02-26 NOTE — PROGRESS NOTES
Patient is unable to communicate at this time as she is resting peacefully at present due to her current condition.  offered prayer. Chaplains will continue to follow and will provide pastoral care on an as needed/requested basis.     Christina Patel   Spiritual Care   [phone]

## 2018-02-26 NOTE — PROGRESS NOTES
0800 Patient is alert but confused  She is oriented to self and place  She folllows commands  0800/ Performed felix care  0800/1200/1600 Performed incontinent care    1838 Held Heparin as per protocol  1900 . Bedside and Verbal shift change report given to Slime ALAN (oncoming nurse) by Sweta Reyna (offgoing nurse). Report included the following information SBAR, Kardex and MAR.

## 2018-02-26 NOTE — PROGRESS NOTES
Infectious Disease Follow-up     Admit Date: 2/22/2018    Current abx Prior abx    Vanco/Zosyn/levaquin 2/22-4        ASSESSMENT: -- RECOMMENDATIONS       Shock- ? Sepsis  - presented with fever, AMS and hypotension  - elevated cr and WBC but normal LA, neg UA and CXr for infiltrates  - influenza screen negative  - coming off pressors, WBC better - continue current Abx until off pressors then streamline or de-escalate  - Monitor cx     Abd pain- RLQ  - ? UTI/Pyelo vs appendix  - abd US: no evid cholecystitis, mild thickening cecal wall, echogenic liver  - pain improved -> monitor   KILO on admission  - suspect sec to dehydration, hypotension  - resolved - avoid nephrotoxic meds and dose Abx and other meds for current CrCl   CHF- pt with elevated BNP and mildly elevated trops  - Cxr with congestion  - Echo with EF 12-71% with diastolic dysfunction - per others   Leg edema- left > right  - pt with no calf tenderness ? sec to gen edema sec to CHF - Will order stat PVL of LE   SVT      Malpositioned Rt IJ line - Mx per others   Dementia      Hypothyroid- TSH of 5.37 and fT4 of 0.9          MICROBIOLOGY:   2/22               Blcx x2 IP                        ucx IP     LINES/DRAINS:      Rt IJ line 2/22    Active Hospital Problems    Diagnosis Date Noted    Underweight 02/25/2018    Acute deep vein thrombosis (DVT) of femoral vein of left lower extremity ([address] 02/23/2018    Shock ([address] 02/22/2018     Urine may be a source of infection for possible septic shock; however, SvO2 should be checked in light of abnormal troponin and pro-BNP, suspicious for cardiogenic shock      Acquired hypothyroidism 02/22/2018    KILO (acute kidney injury) ([address] 02/22/2018    Elevated troponin 02/22/2018    Acute cystitis without hematuria 02/22/2018    Dementia with behavioral disturbance 02/22/2018    Elevated brain natriuretic peptide (BNP) level 02/22/2018    Supraventricular tachycardia ([address] 02/22/2018         Subjective: Interval notes reviewed. Lying in bed on right side. Denies any pain or shortness of breath. Says she's at [facility] but does not know what year it is.   Afebrile    Current Facility-Administered Medications   Medication Dose Route Frequency    albumin human 5% (BUMINATE) solution 12.5 g  12.5 g IntraVENous ONCE    vancomycin (VANCOCIN) 1,000 mg in 0.9% sodium chloride (MBP/ADV) 250 mL  1,000 mg IntraVENous Q12H    [START ON 2018] VANCOMYCIN INFORMATION NOTE   Other ONCE    ondansetron (ZOFRAN) injection 4 mg  4 mg IntraVENous Q6H PRN    heparin 25,000 units in D5W 250 ml infusion  18-36 Units/kg/hr IntraVENous TITRATE    piperacillin-tazobactam (ZOSYN) 4.5 g in 0.9% sodium chloride (MBP/ADV) 100 mL MBP ###EXTENDED 4 HOUR INFUSION###  4.5 g IntraVENous Q8H    VANCOMYCIN INFORMATION NOTE 1 Each  1 Each Other Rx Dosing/Monitoring    sodium chloride (NS) flush 5-10 mL  5-10 mL IntraVENous PRN    levoFLOXacin (LEVAQUIN) 500 mg in D5W IVPB  500 mg IntraVENous Q48H    NOREPINephrine (LEVOPHED) 8 mg in 5% dextrose 250mL infusion  2-16 mcg/min IntraVENous TITRATE    aspirin delayed-release tablet 81 mg  81 mg Oral DAILY    atorvastatin (LIPITOR) tablet 40 mg  40 mg Oral DAILY    sodium chloride (NS) flush 5-10 mL  5-10 mL IntraVENous Q8H    sodium chloride (NS) flush 5-10 mL  5-10 mL IntraVENous PRN    acetaminophen (TYLENOL) tablet 650 mg  650 mg Oral Q6H PRN    levothyroxine (SYNTHROID) injection 37.5 mcg  37.5 mcg IntraVENous Q24H    pantoprazole (PROTONIX) 40 mg in sodium chloride 10 mL injection  40 mg IntraVENous DAILY         Objective:     Visit Vitals    /48    Pulse 78    Temp 97.6 °F (36.4 °C)    Resp 17    Ht 5' 5\" (1.651 m)    Wt 54.7 kg (120 lb 9.5 oz)    SpO2 100%    Breastfeeding No    BMI 20.07 kg/m2       Temp (24hrs), Av °F (36.7 °C), Min:97.4 °F (36.3 °C), Max:98.7 °F (37.1 °C)      GEN: frail 80year-old  lady inno acute distress  HEENT: no scleral icterus, no oral lesions  CHEST: no crackles or wheezes,   CVS:regular, no murmurs  ABD: soft, nontender  EXT: no edema or cyanosis    Labs: Results:   Chemistry Recent Labs      02/26/18   0515  02/25/18   2230  02/25/18   1337  02/25/18   0520   02/24/18   0400   GLU  107*   --    --   78   --   106*   NA  147*   --    --   147*   --   146*   K  3.7  3.7  3.7  3.9   < >  3.8   CL  117*   --    --   117*   --   119*   CO2  22   --    --   21   --   17*   BUN  16   --    --   16   --   21*   CREA  0.77   --    --   0.91   --   0.92   CA  7.2*   --    --   7.0*   --   7.2*   AGAP  8   --    --   9   --   10   BUCR  21*   --    --   18   --   23*   AP  165*   --    --   129*   --   128*   TP  4.8*   --    --   4.6*   --   4.8*   ALB  1.4*   --    --   1.4*   --   1.5*   GLOB  3.4   --    --   3.2   --   3.3   AGRAT  0.4*   --    --   0.4*   --   0.5*    < > = values in this interval not displayed. CBC w/Diff Recent Labs      02/26/18   0515  02/25/18   0520  02/24/18   0400   WBC  15.3*  21.4*  27.5*   RBC  3.32*  3.55*  3.51*   HGB  9.3*  9.8*  9.7*   HCT  28.5*  31.0*  30.7*   PLT  212  230  233   GRANS  72  84*   --    LYMPH  16*  8*   --    EOS  5  4   --             Microbiology Results  No results for input(s): SDES, CULT in the last 72 hours.     James Brown MD  February 26, 2018  [facility] AT THE [facility]  [phone]

## 2018-02-26 NOTE — PROGRESS NOTES
Progress Note      Patient: aMgnolia Jose               Sex: female          DOA: 2/22/2018       YOB: 1928      Age:  80 y.o.        LOS:  LOS: 3 days               Subjective:   Pt is easily awakened . she denies any pain  or  sob. she has a large left  dvt . She is still on pressors in the form of norepinephrine the patient has dementia and has a stuffed  cat to which she relates . Objective:      Visit Vitals    BP 91/49    Pulse 99    Temp 98 °F (36.7 °C)    Resp 27    Ht 5' 5\" (1.651 m)    Wt 60.1 kg (132 lb 7.9 oz)    SpO2 100%    Breastfeeding No    BMI 22.05 kg/m2       Physical Exam:  Pt is awake and is alert   Heart reg rate and rhythm   Lungs good breath sounds heard   Abdomen soft and no pain on palpation   Extremities  leftleg is swollen   Neuro dementia .         Lab/Data Reviewed:  BMP:   Lab Results   Component Value Date/Time     (H) 02/25/2018 05:20 AM    K 3.7 02/25/2018 01:37 PM     (H) 02/25/2018 05:20 AM    CO2 21 02/25/2018 05:20 AM    AGAP 9 02/25/2018 05:20 AM    GLU 78 02/25/2018 05:20 AM    BUN 16 02/25/2018 05:20 AM    CREA 0.91 02/25/2018 05:20 AM    GFRAA >60 02/25/2018 05:20 AM    GFRNA 58 (L) 02/25/2018 05:20 AM     CMP:   Lab Results   Component Value Date/Time     (H) 02/25/2018 05:20 AM    K 3.7 02/25/2018 01:37 PM     (H) 02/25/2018 05:20 AM    CO2 21 02/25/2018 05:20 AM    AGAP 9 02/25/2018 05:20 AM    GLU 78 02/25/2018 05:20 AM    BUN 16 02/25/2018 05:20 AM    CREA 0.91 02/25/2018 05:20 AM    GFRAA >60 02/25/2018 05:20 AM    GFRNA 58 (L) 02/25/2018 05:20 AM    CA 7.0 (L) 02/25/2018 05:20 AM    MG 1.7 02/25/2018 05:20 AM    PHOS 2.2 (L) 02/25/2018 05:20 AM    ALB 1.4 (L) 02/25/2018 05:20 AM    TP 4.6 (L) 02/25/2018 05:20 AM    GLOB 3.2 02/25/2018 05:20 AM    AGRAT 0.4 (L) 02/25/2018 05:20 AM    SGOT 24 02/25/2018 05:20 AM    ALT 16 02/25/2018 05:20 AM     CBC:   Lab Results   Component Value Date/Time    WBC 21.4 (H) 02/25/2018 05:20 AM    HGB 9.8 (L) 02/25/2018 05:20 AM    HCT 31.0 (L) 02/25/2018 05:20 AM     02/25/2018 05:20 AM           Assessment/Plan     Principal Problem:    Shock ([address] (2/22/2018)      Overview: Urine may be a source of infection for possible septic shock; however,       SvO2 should be checked in light of abnormal troponin and pro-BNP,       suspicious for cardiogenic shock    Active Problems:    Acquired hypothyroidism (2/22/2018)      KILO (acute kidney injury) ([address] (2/22/2018)      Elevated troponin (2/22/2018)      Acute cystitis without hematuria (2/22/2018)      Dementia with behavioral disturbance (2/22/2018)      Elevated brain natriuretic peptide (BNP) level (2/22/2018)      Supraventricular tachycardia ([address] (2/22/2018)      Acute deep vein thrombosis (DVT) of femoral vein of left lower extremity ([address] (2/23/2018)      Underweight (2/25/2018)        Plan:continue iv heparin. Platelets are 592 . Continue pressors .  Will follow

## 2018-02-26 NOTE — DIABETES MGMT
NUTRITIONAL ASSESSMENT AND GLYCEMIC CONTROL PLAN OF CARE     Luzmaria Celestin           80 y.o.           2/22/2018                 1. Fever, unspecified fever cause    2. Generalized weakness    3. Elevated serum lactate dehydrogenase    4. Pleural effusion, left    5. Leukocytosis, unspecified type    6. Abnormal EKG    7. Elevated troponin    8. Hypotension, unspecified hypotension type       [x]  No Cultural, Taoist or ethnic dietary need identified. []  Cultural, Taoist and ethnic food preferences identified and addressed    [x]  Participated in discharge planning/Interdisciplinary rounds   Food allergies: [x]  No        []  Yes-  ASSESSMENT:   Pt is underweight related to inadequate caloric intake as evidenced by 96% ideal weight and low BMI= 20.1kg/m2 for her age. Pt does not have unintentional weight loss documented. Pt w/hypoalbuminemia as evidenced by albumin=1.4 mg/dl. INTERVENTIONS/PLAN:   Pt agreed to Chocolate Ensure Enlive supplements tid. Added to diet order. SUBJECTIVE/OBJECTIVE:   Information obtained from:Pt and neice  Diet: Pureed  No data found.     Medications: [x]                Reviewed     Most Recent POC Glucose:   Recent Labs      02/26/18   0515  02/25/18   0520  02/24/18   0400   GLU  107*  78  106*       Labs:   No results found for: HBA1C, HGBE8, RTE1PIWV  Lab Results   Component Value Date/Time    Sodium 147 (H) 02/26/2018 05:15 AM    Potassium 3.7 02/26/2018 05:15 AM    Chloride 117 (H) 02/26/2018 05:15 AM    CO2 22 02/26/2018 05:15 AM    Anion gap 8 02/26/2018 05:15 AM    Glucose 107 (H) 02/26/2018 05:15 AM    BUN 16 02/26/2018 05:15 AM    Creatinine 0.77 02/26/2018 05:15 AM    Calcium 7.2 (L) 02/26/2018 05:15 AM    Magnesium 2.0 02/25/2018 10:30 PM    Phosphorus 2.4 (L) 02/26/2018 05:15 AM    Albumin 1.4 (L) 02/26/2018 05:15 AM     Anthropometrics: IBW : 56.7 kg (125 lb), % IBW (Calculated): 96.47 %, BMI (calculated): 20.1  Wt Readings from Last 1 Encounters: 02/26/18 54.7 kg (120 lb 9.5 oz)      Ht Readings from Last 1 Encounters:   02/26/18 5' 5\" (1.651 m)     Estimated Nutrition Needs: 1211 Kcals/day, Protein (g): 66 g Fluid (ml): 1800 ml  Based on:   [x]          Actual BW    []          IBW   []            Adjusted BW    Nutrition Diagnoses:      Underweight as stated above. Nutrition Interventions: Ensure Enlive supplements tid  Goal:     Weight gain 1 lb. per week or wt. Maintenance by 3/8/18. .   Intake will meet >75% of energy and protein requirements by  3/3/18.     Nutrition Monitoring and Evaluation      []     Monitor po intake on meal rounds  [x]     Continue inpatient monitoring and intervention  []     Other:  Nutrition Risk:  []   High     [x]  Moderate    []  Minimal/Uncompromised    Debbie Pretty, RD

## 2018-02-26 NOTE — PROGRESS NOTES
193  Bedside report yobany from offoging  [address]. Patient lying in bed, voices no complaints. f  ollows commands, but is confused. Oriented to her name and  only    2200  Labs drawn via her right     00  Levophed had to be increased due to low bp readings      0100  Decreased heparin drip  Per protocol    0400  Patient sleeping. Wanted to change the sheets underneath her but she want to sleep some more.       0715  PTT drawn from brown port of right IJ    0730  Bedside SBAR report given to incoming RN Uri Savage, [facility]   is her to draw vanc trough level

## 2018-02-26 NOTE — PROGRESS NOTES
Pharmacy Dosing Services: Vancomycin    Indication: Sepsis of unknown etiology    Day of therapy: 3    Other Antimicrobials (Include dose, start day & day of therapy):  Zosyn 4.5g IV q8h EIPT (stated )      Loading dose (date given): 1000mg-    Current Maintenance dose: 750 mg IV q24h     Goal Vancomycin Level: 15-20  (Trough 15-20 for most infections, 20 for meningitis/osteomyelitis, pre-HD level ~25)    Vancomycin Level (if drawn):    - 6.2 (21hr post dose)- not properly loaded    - 17.6 (~10 hr post dose) trough level drawn early, q24h dosing extrapolation subtherapeutic    Significant Cultures:    blood- NGTD   urine - NGTD    Renal function stable? (unstable defined as SCr increase of 0.5 mg/dL or > 50% increase from baseline, whichever is greater) (Y/N): Y    CAPD, Hemodialysis or Renal Replacement Therapy (Y/N): N     Recent Labs      18   0515  18   0520  18   0400   CREA  0.77  0.91  0.92   BUN  16  16  21*   WBC  15.3*  21.4*  27.5*     Temp (24hrs), Av °F (36.7 °C), Min:97.4 °F (36.3 °C), Max:98.7 °F (37.1 °C)    Creatinine Clearance (Creatinine Clearance (ml/min)): 42.8 ml/min     Regimen assessment: Extrapolated level subtherapeutic; Hung the last dose of 750mg  -increase total maintenance dose    Maintenance dose: 1000 mg IV 12h  Next scheduled level:  prior to 1600 dose       Pharmacy will follow daily and adjust medications as appropriate for renal function and/or serum levels.     Thank you,  YOUNG Terrell

## 2018-02-26 NOTE — PROGRESS NOTES
Problem: Dysphagia (Adult)  Goal: *Acute Goals and Plan of Care (Insert Text)  Recommendations:  Diet: Puree/thin liquids  Meds: crushed  Aspiration Precautions  Oral Care TID    Goals:  Patient will:  1. Tolerate PO trials with 0 s/s overt distress in 4/5 trials  2. Utilize compensatory swallow strategies/maneuvers (decrease bite/sip, size/rate, alt. liq/sol) with min cues in 4/5 trials  3. Complete an objective swallow study (i.e., MBSS) to assess swallow integrity, r/o aspiration, and determine of safest LRD, min A       Outcome: Not Progressing Towards Goal  Speech language pathology dysphagia treatment    Patient: Marissa Bangura (3 y.o. female)  Date: 2/26/2018  Diagnosis: Sepsis associated hypotension (HCC) Shock ([facility] [address]       Precautions: aspiration       ASSESSMENT:  SLP followed up this afternoon; pt requiring strong encouragement for po intake and to stay awake for po. SLP presented thin liquids + straw with no s/s aspiration. Exhibited labored bolus prep and transport. With increased time and liquid wash 100% oral clearance appreciated. At this time, pt remains is safest for puree/thin liquid diet; meds crushed in puree; small bites and sips. SLP will follow for diet tolerance and advancement as indicated. Progression toward goals:  []         Improving appropriately and progressing toward goals  [x]         Improving slowly and progressing toward goals  []         Not making progress toward goals and plan of care will be adjusted     PLAN:  Recommendations and Planned Interventions:  Patient continues to benefit from skilled intervention to address the above impairments. Continue treatment per established plan of care. Discharge Recommendations:  Skilled Nursing Facility     SUBJECTIVE:   Patient stated Yon[facility] Bloom.     OBJECTIVE:   Cognitive and Communication Status:  Neurologic State: Drowsy, Eyes open to voice  Orientation Level: Oriented to person, Oriented to place, Disoriented to time, Disoriented to situation  Cognition: Follows commands           Dysphagia Treatment:  Oral Assessment:     P.O. Trials:   Patient Position: HOB 30   Vocal quality prior to P.O.: Low volume   Consistency Presented: Thin liquid, Pudding   How Presented: SLP-fed/presented       Bolus Acceptance: Impaired   Bolus Formation/Control: Impaired   Type of Impairment: Delayed, Mastication   Propulsion: Delayed (# of seconds)   Oral Residue: Less than 10% of bolus, Lingual   Initiation of Swallow: Delayed (# of seconds)   Laryngeal Elevation: Functional   Aspiration Signs/Symptoms: None   Pharyngeal Phase Characteristics: Poor endurance   Effective Modifications: Small sips and bites   Cues for Modifications: Maximal         Oral Phase Severity: Mild-moderate   Pharyngeal Phase Severity : Mild        PAIN:  Start of Tx: 0  End of Tx: 0     After treatment:   []              Patient left in no apparent distress sitting up in chair  [x]              Patient left in no apparent distress in bed  [x]              Call bell left within reach  []              Nursing notified  []              Family present  []              Caregiver present  []              Bed alarm activated      COMMUNICATION/EDUCATION:   [x] Aspiration precautions; swallow safety; compensatory techniques  [x]        Patient unable to participate in education; education ongoing with staff  []  Posted safety precautions in patient's room.   [] Oral-motor/laryngeal strengthening exercises      FREYA Negrete  Time Calculation: 10 mins

## 2018-02-27 NOTE — CDMP QUERY
Please provide the acuity of this pt systolic Chf as     =>Acute Systolic Chf treated with lasix 40 mg iv       Or   =>Chronic Systolic Chf       The medical record reflects the following:    Risk: 80 yof admitted with sepsis, with elevated trop,shock     Clinical Indicators: bnp >35,000, Cxr shows pleura effusion, Ef 45-50%,     Treatment:   Lasix x 2 40 mg iv     Please clarify and document your clinical opinion in the progress notes and discharge summary including the definitive and/or presumptive diagnosis, (suspected or probable), related to the above clinical findings. Please include clinical findings supporting your diagnosis. If you DECLINE this query or would like to communicate with [employer], please utilize the \"[employer] message box\" at the TOP of the Progress Note on the right.       Thank you,  Lexus Forbes RN/CCDs  [phone]

## 2018-02-27 NOTE — PROGRESS NOTES
Infectious Disease Follow-up     Admit Date: 2/22/2018    Current abx Prior abx    Vanco/Zosyn/levaquin 2/22-5        ASSESSMENT: -- RECOMMENDATIONS       Shock- ? Sepsis  - presented with fever, AMS and hypotension  - elevated cr and WBC but normal LA, neg UA and CXr for infiltrates  - influenza screen negative  - off pressors, WBC better  - more cardiogenic than septic? -> dc Vanco and Zosyn  -> change levofloxacin to po x 2 more days  -> Monitor cx     Abd pain- RLQ  - ? UTI/Pyelo vs appendix  - abd US: no evid cholecystitis, mild thickening cecal wall, echogenic liver  - pain improved -> monitor   KILO on admission  - suspect sec to dehydration, hypotension  - resolved - avoid nephrotoxic meds and dose Abx and other meds for current CrCl   CHF- pt with elevated BNP and mildly elevated trops  - Cxr with congestion  - Echo with EF 62-28% with diastolic dysfunction - per others   Leg edema- left > right  - +DVT    SVT      Malpositioned Rt IJ line - Mx per others   Dementia      Hypothyroid- TSH of 5.37 and fT4 of 0.9          MICROBIOLOGY:   2/22               Blcx x2 IP                        ucx IP     LINES/DRAINS:      Rt IJ line 2/22    Active Hospital Problems    Diagnosis Date Noted    Underweight 02/25/2018    Acute deep vein thrombosis (DVT) of femoral vein of left lower extremity (HCC) 02/23/2018    Shock ([address] 02/22/2018     Urine may be a source of infection for possible septic shock; however, SvO2 should be checked in light of abnormal troponin and pro-BNP, suspicious for cardiogenic shock      Acquired hypothyroidism 02/22/2018    KILO (acute kidney injury) ([address] 02/22/2018    Elevated troponin 02/22/2018    Acute cystitis without hematuria 02/22/2018    Dementia with behavioral disturbance 02/22/2018    Elevated brain natriuretic peptide (BNP) level 02/22/2018    Supraventricular tachycardia ([address] 02/22/2018         Subjective: Interval notes reviewed.   Sitting up in bed hugging stuffed cat. Says her throat hurts. Denies shortness of breath. Afebrile.     Current Facility-Administered Medications   Medication Dose Route Frequency    [START ON 2018] levothyroxine (SYNTHROID) tablet 75 mcg  75 mcg Oral 6am    vancomycin (VANCOCIN) 1,000 mg in 0.9% sodium chloride (MBP/ADV) 250 mL  1,000 mg IntraVENous Q12H    VANCOMYCIN INFORMATION NOTE   Other ONCE    ondansetron (ZOFRAN) injection 4 mg  4 mg IntraVENous Q6H PRN    heparin 25,000 units in D5W 250 ml infusion  18-36 Units/kg/hr IntraVENous TITRATE    piperacillin-tazobactam (ZOSYN) 4.5 g in 0.9% sodium chloride (MBP/ADV) 100 mL MBP ###EXTENDED 4 HOUR INFUSION###  4.5 g IntraVENous Q8H    VANCOMYCIN INFORMATION NOTE 1 Each  1 Each Other Rx Dosing/Monitoring    sodium chloride (NS) flush 5-10 mL  5-10 mL IntraVENous PRN    levoFLOXacin (LEVAQUIN) 500 mg in D5W IVPB  500 mg IntraVENous Q48H    aspirin delayed-release tablet 81 mg  81 mg Oral DAILY    atorvastatin (LIPITOR) tablet 40 mg  40 mg Oral DAILY    sodium chloride (NS) flush 5-10 mL  5-10 mL IntraVENous Q8H    sodium chloride (NS) flush 5-10 mL  5-10 mL IntraVENous PRN    acetaminophen (TYLENOL) tablet 650 mg  650 mg Oral Q6H PRN    pantoprazole (PROTONIX) 40 mg in sodium chloride 10 mL injection  40 mg IntraVENous DAILY         Objective:     Visit Vitals    /60    Pulse 91    Temp 97.6 °F (36.4 °C)    Resp 29    Ht 5' 5\" (1.651 m)    Wt 54.7 kg (120 lb 9.5 oz)    SpO2 100%    Breastfeeding No    BMI 20.07 kg/m2       Temp (24hrs), Av.2 °F (36.8 °C), Min:97.6 °F (36.4 °C), Max:98.8 °F (37.1 °C)      GEN: frail 80year-old  lady inno acute distress  HEENT: no scleral icterus, no oral lesions  CHEST: no crackles or wheezes,   CVS:regular, no murmurs  ABD: soft, nontender  EXT:left leg edema/ecchymoses    Labs: Results:   Chemistry Recent Labs      18   0250  18   1700  18   0515   18   0520   GLU  95   --   107*   -- 78   NA  147*   --   147*   --   147*   K  3.6  3.7  3.7   < >  3.9   CL  118*   --   117*   --   117*   CO2  21   --   22   --   21   BUN  11   --   16   --   16   CREA  0.53*   --   0.77   --   0.91   CA  7.1*   --   7.2*   --   7.0*   AGAP  8   --   8   --   9   BUCR  21*   --   21*   --   18   AP  155*   --   165*   --   129*   TP  4.8*   --   4.8*   --   4.6*   ALB  1.8*   --   1.4*   --   1.4*   GLOB  3.0   --   3.4   --   3.2   AGRAT  0.6*   --   0.4*   --   0.4*    < > = values in this interval not displayed. CBC w/Diff Recent Labs      02/27/18   0250  02/26/18   0515  02/25/18   0520   WBC  15.5*  15.3*  21.4*   RBC  2.96*  3.32*  3.55*   HGB  8.1*  9.3*  9.8*   HCT  25.5*  28.5*  31.0*   PLT  257  212  230   GRANS  81*  72  84*   LYMPH  13*  16*  8*   EOS  2  5  4            Microbiology Results  No results for input(s): SDES, CULT in the last 72 hours.     David Turk MD  February 27, 2018  [facility] AT THE [facility]  [phone]

## 2018-02-27 NOTE — PROGRESS NOTES
Medical Progress Note      NAME: Luzmaria Celestin   :  3/9/1928  MRM:  [id number]    Date/Time: 2018  1:46 PM         Subjective:     Dementia. Drowsy notes stuffed cat    Past Medical History reviewed and unchanged from Admission History and Physical    Review of Systems   Unable to perform ROS: Dementia            Objective:     Off pressors. Cultures NGTD. PCCM and ID onboard     Vitals:      Last 24hrs VS reviewed since prior progress note. Most recent are:    Visit Vitals    /60    Pulse 91    Temp 97.6 °F (36.4 °C)    Resp 29    Ht 5' 5\" (1.651 m)    Wt 54.7 kg (120 lb 9.5 oz)    SpO2 100%    Breastfeeding No    BMI 20.07 kg/m2     SpO2 Readings from Last 6 Encounters:   18 100%   18 94%   18 95%   18 (!) 72%   02/15/18 98%    O2 Flow Rate (L/min): 4 l/min       Intake/Output Summary (Last 24 hours) at 18 1213  Last data filed at 18 0800   Gross per 24 hour   Intake          1481.06 ml   Output              125 ml   Net          1356.06 ml          Exam:      Physical Exam   Constitutional: No distress. HENT:   Head: Normocephalic. Neck: Normal range of motion. Cardiovascular: Normal rate. Pulmonary/Chest: Effort normal and breath sounds normal.   Abdominal: Soft. Bowel sounds are normal.   Musculoskeletal: She exhibits edema (LLE). Neurological:   drowsy   Skin: She is not diaphoretic. There is erythema (to bottow).          Medications:  Current Facility-Administered Medications   Medication Dose Route Frequency    [START ON 2018] levothyroxine (SYNTHROID) tablet 75 mcg  75 mcg Oral 6am    vancomycin (VANCOCIN) 1,000 mg in 0.9% sodium chloride (MBP/ADV) 250 mL  1,000 mg IntraVENous Q12H    VANCOMYCIN INFORMATION NOTE   Other ONCE    ondansetron (ZOFRAN) injection 4 mg  4 mg IntraVENous Q6H PRN    heparin 25,000 units in D5W 250 ml infusion  18-36 Units/kg/hr IntraVENous TITRATE    piperacillin-tazobactam (ZOSYN) 4.5 g in 0.9% sodium chloride (MBP/ADV) 100 mL MBP ###EXTENDED 4 HOUR INFUSION###  4.5 g IntraVENous Q8H    VANCOMYCIN INFORMATION NOTE 1 Each  1 Each Other Rx Dosing/Monitoring    sodium chloride (NS) flush 5-10 mL  5-10 mL IntraVENous PRN    levoFLOXacin (LEVAQUIN) 500 mg in D5W IVPB  500 mg IntraVENous Q48H    aspirin delayed-release tablet 81 mg  81 mg Oral DAILY    atorvastatin (LIPITOR) tablet 40 mg  40 mg Oral DAILY    sodium chloride (NS) flush 5-10 mL  5-10 mL IntraVENous Q8H    sodium chloride (NS) flush 5-10 mL  5-10 mL IntraVENous PRN    acetaminophen (TYLENOL) tablet 650 mg  650 mg Oral Q6H PRN    pantoprazole (PROTONIX) 40 mg in sodium chloride 10 mL injection  40 mg IntraVENous DAILY       ______________________________________________________________________      Lab Review:     Recent Labs      02/27/18   0250  02/26/18   0515  02/25/18   0520   WBC  15.5*  15.3*  21.4*   HGB  8.1*  9.3*  9.8*   HCT  25.5*  28.5*  31.0*   PLT  257  212  230     Recent Labs      02/27/18   0250  02/26/18   1700  02/26/18   0515  02/25/18   2230   02/25/18   0520   NA  147*   --   147*   --    --   147*   K  3.6  3.7  3.7  3.7   < >  3.9   CL  118*   --   117*   --    --   117*   CO2  21   --   22   --    --   21   GLU  95   --   107*   --    --   78   BUN  11   --   16   --    --   16   CREA  0.53*   --   0.77   --    --   0.91   CA  7.1*   --   7.2*   --    --   7.0*   MG  1.7   --    --   2.0   --   1.7   PHOS  2.3*   --   2.4*   --    --   2.2*   ALB  1.8*   --   1.4*   --    --   1.4*   SGOT  32   --   25   --    --   24   ALT  17   --   17   --    --   16    < > = values in this interval not displayed. No components found for: GLPOC  No results for input(s): PH, PCO2, PO2, HCO3, FIO2 in the last 72 hours. No results for input(s): INR in the last 72 hours.     No lab exists for component: INREXT, INREXT, INREXT             Assessment:     Principal Problem:    Shock ([address] (2/22/2018)      Overview: Urine may be a source of infection for possible septic shock; however,       SvO2 should be checked in light of abnormal troponin and pro-BNP,       suspicious for cardiogenic shock    Active Problems:    Acquired hypothyroidism (2/22/2018)      KILO (acute kidney injury) ([address] (2/22/2018)      Elevated troponin (2/22/2018)      Acute cystitis without hematuria (2/22/2018)      Dementia with behavioral disturbance (2/22/2018)      Elevated brain natriuretic peptide (BNP) level (2/22/2018)      Supraventricular tachycardia ([address] (2/22/2018)      Acute deep vein thrombosis (DVT) of femoral vein of left lower extremity ([address] (2/23/2018)      Underweight (2/25/2018)           Plan:     Sepsis shock   -now off pressors  -noted also probable cardiac component  -unclear source however thoughts possible RLL pneumonia per Pulmonary  -blood cultures NGTD  -leukocytosis present however improving  -broad spectum abx for now as per ID  -lactic elevated likely 2/2 hypotension  -procal elevated  -cortisol level ok  -non hypoxic  -do not think meningitis however unclear how baseline    CMO  -noted Cards believes likely Takastubos  -noting troponings had essentially flatteded  -ECHO noted 2/22/18 noting EF 45-50% with hypokenies of basal mid anteroseptal, mid inferoseptal, apical interior and mid anterolateral walls. Akinesis of the entire anterior, apical septa, apical lateral and apical walls, mildly to moderate increased LV wall Thickness. Grade 1 diastolic dysfunction. Reduced RV systolic function.  Mild mitral regurgitation mild tricuspid regurg with reduced RV systolic Function, and mild mitral regur and dilated IVC  -also notted Moderate to severe AS with valve mean gradient 36mmgh  -BNP is noted to be greater than 35k defer diuresis to Pulmonary once BP more stable off pressors    DVT LLE  -heparin gtt     KILO  -improved  -monitor    Hypernatermia hypernatreamia  -noted net positive 8L  -monitor for now  -defer diuresis to Pulmonary once BP is more stable     Elevated troponin  -no cp  -EKG shows no ST elevation  -likely demand ischemia noted Cards seen noting likely Takasubtbos     Hypothyroidism  -TSH  -synthyroid  -switch to IV     DVT prophylaxis  -heparin gtt       ___________________________________________________    Attending Physician: Levon Wan NP

## 2018-02-27 NOTE — PROGRESS NOTES
Problem: Dysphagia (Adult)  Goal: *Acute Goals and Plan of Care (Insert Text)  Recommendations:  Diet: Puree/thin liquids  Meds: crushed  Aspiration Precautions  Oral Care TID    Goals:  Patient will:  1. Tolerate PO trials with 0 s/s overt distress in 4/5 trials - goal met  2. Utilize compensatory swallow strategies/maneuvers (decrease bite/sip, size/rate, alt. liq/sol) with min cues in 4/5 trials - max A  3. Complete an objective swallow study (i.e., MBSS) to assess swallow integrity, r/o aspiration, and determine of safest LRD, min A -not indicated       Outcome: Resolved/Met Date Met: 02/27/18  Speech language pathology dysphagia treatment & discharge    Patient: Stephanie Ponce (14 y.o. female)  Date: 2/27/2018  Diagnosis: Sepsis associated hypotension (HCC) Shock ([facility] [address]       Precautions: aspiration       ASSESSMENT:  Follow up this afternoon with pt continuing drowsy requiring max cues to remain alert for po intake. Pt with minimal intake of puree/thin liquid via tsp from SLP. No aspiration s/s noted. May need to consider PEG if unable to meet nutritional needs via po diet alone; however, preferred recommendation is QOL/comfort approach to nutrition due to advanced age and multiple comorbidities. Maximum therapeutic gains met; safest, least restrictive diet achieved in current in-patient/acute setting. Accordingly, SLP to d/c intervention at this time. Pt remains safest for puree/thin liquid diet only when alert and at midline. Progression toward goals:  []         Improving appropriately and progressing toward goals  []         Improving slowly and progressing toward goals  []         Not making progress toward goals and plan of care will be adjusted     PLAN:  Recommendations and Planned Interventions:  Maximum therapeutic gains met; safest, least restrictive diet achieved. D/C ST intervention at this time.    Discharge Recommendations:  Skilled Nursing Facility     SUBJECTIVE:   Patient stated i'm okay. OBJECTIVE:   Cognitive and Communication Status:  Neurologic State: Drowsy, Eyes open spontaneously  Orientation Level: Disoriented to place, Disoriented to situation, Disoriented to time, Oriented to person  Cognition: Follows commands, Decreased attention/concentration           Dysphagia Treatment:  Oral Assessment:     P.O. Trials:   Patient Position: HOB 30   Vocal quality prior to P.O.: Low volume   Consistency Presented: Thin liquid, Pudding   How Presented: SLP-fed/presented       Bolus Acceptance: Impaired   Bolus Formation/Control: Impaired   Type of Impairment: Delayed, Mastication   Propulsion: Delayed (# of seconds)   Oral Residue: Less than 10% of bolus, Lingual   Initiation of Swallow: Delayed (# of seconds)   Laryngeal Elevation: Functional   Aspiration Signs/Symptoms: None   Pharyngeal Phase Characteristics: Poor endurance   Effective Modifications: Small sips and bites   Cues for Modifications: Maximal         Oral Phase Severity: Mild-moderate   Pharyngeal Phase Severity : Mild     PAIN:  Start of Tx: 0  End of Tx: 0     After treatment:   []              Patient left in no apparent distress sitting up in chair  [x]              Patient left in no apparent distress in bed  [x]              Call bell left within reach  []              Nursing notified  []              Family present  []              Caregiver present  []              Bed alarm activated      COMMUNICATION/EDUCATION:   [x] Aspiration precautions; swallow safety; compensatory techniques  [x]        Patient unable to participate in education; education ongoing with staff  []  Posted safety precautions in patient's room.   [] Oral-motor/laryngeal strengthening exercises      Steffany Lopez SLP  Time Calculation: 15 mins

## 2018-02-27 NOTE — PROGRESS NOTES
Problem: Mobility Impaired (Adult and Pediatric)  Goal: *Acute Goals and Plan of Care (Insert Text)  Physical Therapy Goals  Initiated 2/27/2018 and to be accomplished within 3 day trial.   If appropriate continue per plan of care to accomplish goals within 7 days. 1.  Patient will maintain eyes open for greater than 90% of session to allow for active participation in mobility training. 2.  Patient will follow 90% of commands to maximize safety and active participation in mobility training. 3.  Patient will move from supine to sit and sit to supine  in bed with minimal assistance/contact guard assist.     4.  Patient will maintain seated at edge of bed for 8 min with supervision/set-up to prepare for out of bed activity. 5.  Patient will perform sit to stand with supervision/set-up. 6.  Patient will transfer from bed to chair and chair to bed with supervision/set-up using the least restrictive device. Outcome: Progressing Towards Goal  physical Therapy EVALUATION    Patient: Nuris Villagran (87 y.o. female)  Date: 2/27/2018  Primary Diagnosis: Sepsis associated hypotension (HCC)  Precautions:  Fall, Skin    ASSESSMENT :   atient requires between maximal assistance and minimal assistance/contact guard assist for bed mobility, transfers and ambulation. Oriented to self and place. Oriented to year. Max A for supine to sit. Seated EOB with min A/supervision for balance. BLE knee extension x5. Refused standing trial. Perseverates on returning to bed d/t cold. Max A for sit to supine. Seated in bed with HOB elevated. DAYANNA Reilly aware. Unable to determine prior level of mobility. Per chart, patient non-amb and total assistance at skilled nursing; however patient reports amb with walker for short distances. Will follow for 3 day trial to better determine active participation in skilled treatment and prior level of function. If appropriate, continue per plan of care 3-5x/week to address goals.    Patient presents with deficits in:  Bed Mobility, Transfers, Gait, Strength and Balance    Patient will benefit from skilled intervention to address the above impairments. Patients rehabilitation potential is considered to be Guarded  Factors which may influence rehabilitation potential include:   []         None noted  [x]         Mental ability/status  [x]         Medical condition  []         Home/family situation and support systems  []         Safety awareness  []         Pain tolerance/management  []         Other:      PLAN :  Recommendations and Planned Interventions:  [x]           Bed Mobility Training             [x]    Neuromuscular Re-Education  [x]           Transfer Training                   []    Orthotic/Prosthetic Training  [x]           Gait Training                          []    Modalities  [x]           Therapeutic Exercises          []    Edema Management/Control  [x]           Therapeutic Activities            [x]    Patient and Family Training/Education  []           Other (comment):    Frequency/Duration: Patient will be follow for 3 day trial to better determine active participation in skilled treatment and prior level of function. If appropriate, continue per plan of care 3-5x/week to address goals. Discharge Recommendations: Skilled Nursing Facility  Further Equipment Recommendations for Discharge: TBD with OOB     SUBJECTIVE:   Patient stated pily Pepe.     OBJECTIVE DATA SUMMARY:   History reviewed. No pertinent past medical history. History reviewed. No pertinent surgical history. Barriers to Learning/Limitations: yes;  cognitive  Compensate with: visual, verbal, tactile, kinesthetic cues/model    G CODES:Mobility  Current  CL= 60-79%   Goal  CJ= 20-39%.   The severity rating is based on the [employer] Sitting Balance Scale 2/5      Eval Complexity: History: MEDIUM  Complexity : 1-2 comorbidities / personal factors will impact the outcome/ POC Exam:MEDIUM Complexity : 3 Standardized tests and measures addressing body structure, function, activity limitation and / or participation in recreation  Presentation: MEDIUM Complexity : Evolving with changing characteristics  Clinical Decision Making:Medium Complexity [facility] Sitting Balance Scale 2/5 Overall Complexity:MEDIUM    [address] Sitting Balance Scale 2/5  0: Pt performs 25% or less of sitting activity (Max assist) CN, 100% impaired. 1: Pt supports self with upper extremities but requires therapist assistance. Pt performs 25-50% of effort (Mod assist) CM, 80% to <100% impaired. 1+: Pt supports self with upper extremities but requires therapist assistance. Pt performs >50% effort. (Min assist). CL, 60% to <80% impaired. 2: Pt supports self independently with both upper extremities. CL, 60% to <80% impaired. 2+: Pt support self independently with 1 upper extremity. CK, 40% to <60% impaired. 3: Pt sits without upper extremity support for up to 30 seconds. CK, 40% to <60% impaired. 3+: Pt sits without upper extremity support for 30 seconds or greater. CJ, 20% to <40% impaired. 4: Pt moves and returns trunkal midpoint 1-2 inches in one plane. CJ, 20% to <40% impaired. 4+: Pt moves and returns trunkal midpoint 1-2 inches in multiple planes. CI, 1% to <20% impaired. 5: Pt moves and returns trunkal midpoint in all planes greater than 2 inches. CH, 0% impaired. Prior Level of Function/Home Situation: Per chart, non-amb and total assistance; however patient reports amb short distance with walker.   Home Situation  Home Environment: Skilled nursing facility  One/Two Story Residence: One story  Living Alone: No  Support Systems: Skilled nursing facility  Patient Expects to be Discharged to[de-identified] Skilled nursing facility  Current DME Used/Available at Home: None  Critical Behavior:  Neurologic State: Alert  Orientation Level: Oriented to place;Oriented to person  Cognition: Follows commands;Decreased attention/concentration  Safety/Judgement: Fall prevention  Psychosocial  Patient Behaviors: Cooperative  Strength:    Strength: Generally decreased, functional  Manual Muscle Testing (LE)         R     L    Hip Flexion:   3+/5  3+/5    Knee EXT:   3+/5  3+/5  Knee FLEX:   3+/5  3+/5      Ankle DF:   3+/5  3+/5  _________________________________________________     Range Of Motion:  AROM: Generally decreased, functional  Functional Mobility:  Bed Mobility:  Supine to Sit: Maximum assistance  Sit to Supine: Maximum assistance  Transfers:  Sit to Stand:  (refused)  Balance:   Sitting: Impaired  Sitting - Static: Fair (occasional)  Sitting - Dynamic: Fair (occasional)  Standing:  (not tested)  Ambulation/Gait Training:  Gait Description (WDL):  (not tested)  Therapeutic Exercises:   Seated EOB 3 min  BLE knee extension x5   Pain:  Pre treatment pain level: 0  Post treatment pain level: 0  Pain Scale 1: Adult Nonverbal Pain Scale  Pain Intensity 1: 0  Activity Tolerance:   Poor  Please refer to the flowsheet for vital signs taken during this treatment. After treatment:   []         Patient left in no apparent distress sitting up in chair  [x]         Patient left in no apparent distress in bed  [x]         Call bell left within reach  [x]         Nursing notified  []         Caregiver present  []         Bed alarm activated    COMMUNICATION/EDUCATION:   [x]         Fall prevention education was provided and the patient/caregiver indicated understanding. [x]         Patient/family have participated as able in goal setting and plan of care. [x]         Patient/family agree to work toward stated goals and plan of care. []         Patient understands intent and goals of therapy, but is neutral about his/her participation. []         Patient is unable to participate in goal setting and plan of care. Patient educated on the role of physical therapy during the acute stay  and the importance of mobility. YENI.       Thank you for this referral.  Ingrid Musa, PT   Time Calculation: 13 mins

## 2018-02-27 NOTE — PROGRESS NOTES
Pharmacy Dosing Services: Vancomycin    Indication: Sepsis of unknown etiology    Day of therapy: 4    Other Antimicrobials (Include dose, start day & day of therapy):  Zosyn 4.5g IV q8h EIPT (started )  Levofloxacin 500mg IV Q48H (started )      Loading dose (date given): 1000mg-    Current Maintenance dose: 1000 mg IV q12h     Goal Vancomycin Level: 15-20  (Trough 15-20 for most infections, 20 for meningitis/osteomyelitis, pre-HD level ~25)    Vancomycin Level (if drawn):    - 6.2 (21hr post dose)- not properly loaded    - 17.6 (~10 hr post dose) trough level drawn early, q24h dosing extrapolation subtherapeutic   - 22.2 (~11 hours post second dose)     Significant Cultures:    blood- NGTD   urine - NGTD    Renal function stable? (unstable defined as SCr increase of 0.5 mg/dL or > 50% increase from baseline, whichever is greater) (Y/N): Y    CAPD, Hemodialysis or Renal Replacement Therapy (Y/N): N     Recent Labs      18   0250  18   0515  18   0520   CREA  0.53*  0.77  0.91   BUN  11  16  16   WBC  15.5*  15.3*  21.4*     Temp (24hrs), Av °F (36.7 °C), Min:97.6 °F (36.4 °C), Max:98.7 °F (37.1 °C)    Creatinine Clearance (Creatinine Clearance (ml/min)): 62.1 ml/min     Regimen assessment: Supratherapeutic, will reduce dose  Maintenance dose: 750 mg IV q12h  Next scheduled level: 3/1 prior to 0700 dose       Pharmacy will follow daily and adjust medications as appropriate for renal function and/or serum levels.     Thank you,  Catalina Perla, PHARMD

## 2018-02-27 NOTE — PROGRESS NOTES
[facility]: [phone])  [facility]: [phone]   [facility]: [phone]    Patient Name: Cali Álvarez  YOB: 1928    Date of Initial Consult: 2/27/18  Reason for Consult: care decisions  Requesting Provider: Dr. Caron Gonsalves  Primary Care Physician: Ros Mortensen MD      SUMMARY:   Cali Álvarez is a 80y.o. year old with a past history of dementia and hypothyroidism, who was admitted on 2/22/2018 from [address] [place] [address] with a diagnosis of sepsis. Current medical issues leading to Palliative Medicine involvement include: 81 y/o [facility] resident with dementia, admitted to ICU with sepsis, on pressors. Palliative care is consulted for goals of care discussion. PALLIATIVE DIAGNOSES:   1. ACP/goals of care discussion  2. Dementia  3. shock  4. DVT  5. Elevated BNP  6. debility       PLAN:   1. ACP/goals of care discussion: met with patient at bedside along with Lexus Romero NP. She is able to say her name and that she is in the hospital when prompted, does not know year or POTUS. She has a stuffed cat on the bed that she says is called \"Cupcake\". She would repeat some of the questions that we asked her to [address] [place] [address]. The patient was not able to engage in a goals of care discussion with us today. I called [address] [place] [address] and asked for a copy of an AMD or MPOA, however did not receive a fax from them. The patient's daughter and niece are listed as contacts. I was unable to leave a message on either of the phone numbers listed for the daughter, but did leave a message for the niece. I was later able to speak with the patient's niece, Radha Meeks. She told me that the patient is  with 3 adult children. Unfortunately, the patient's daughter Darlin Gray is at a rehab center following a brain surgery and likely will not be able to participate in goals of care discussion at this time.   The patient also has 2 sons, Angelica Boykin, who lives in [place], and Naye, who lives in [place]. The patient is , so in the absence of an AMD medical decision making would fall to her 3 children. As Darlin Gray is not able to participate at this time we will reach out to the patient's 2 remaining children. Jerson Maciel does not have a phone, but she gave me 2 numbers for Naye, work number [phone] and home number [phone]. We contacted Naye and arranged a family meeting for 7 AM tomorrow via phone. At this time continue current/agressive care and full code. 2. Dementia: patient is alert and oriented x 1-2 today, unsure of baseline  3. Shock: sepsis versus cardiogenic shock, now off pressors, WBC trending down, cultures NGTD  4. DVT: LLE, present on admission, heparin drip   5. Elevated BNP: EF 45-50%, per ICU will start diuresis  6. Debility: Per  notes patient has been living in the [facility] for about 2 weeks and is dependent for ADLs, seen by SLP and safe for puree diet and thin liquids  7. Initial consult note routed to primary continuity provider  8. Communicated plan of care with: Palliative IDT    GOALS OF CARE:  Patient/Health Care Proxy Stated Goals:  (To be determined)      TREATMENT PREFERENCES:   Code Status: Full Code    Advance Care Planning:  Advance Care Planning 2/27/2018   Patient's Healthcare Decision Maker is: Legal Next of Marvel 69   Primary Decision Maker Name Concha Ask   Primary Decision Maker Phone Number [phone] or [phone]   Primary Decision Maker Relationship to Patient Adult child   Confirm Advance Directive -   Patient Would Like to Complete Advance Directive Unable               Other:  As far as possible, the palliative care team has discussed with patient / health care proxy about goals of care / treatment preferences for patient.      HISTORY:     History obtained from: chart    CHIEF COMPLAINT:fatigue/weakness/loose stools    HPI/SUBJECTIVE:    The patient is:   [] Verbal and participatory  [x] Non-participatory due to:   Confusion/dementia     Clinical Pain Assessment (nonverbal scale for nonverbal patients): Clinical Pain Assessment  Severity: 0     Activity (Movement): Lying quietly, normal position    Duration: for how long has pt been experiencing pain (e.g., 2 days, 1 month, years)  Frequency: how often pain is an issue (e.g., several times per day, once every few days, constant)     FUNCTIONAL ASSESSMENT:     Palliative Performance Scale (PPS):  PPS: 30            PSYCHOSOCIAL/SPIRITUAL SCREENING:      Any spiritual / Yazidi concerns:  [] Yes /  [] No    Caregiver Burnout:  [] Yes /  [] No /  [x] No Caregiver Present      Anticipatory grief assessment:   [] Normal  / [] Maladaptive        REVIEW OF SYSTEMS:     Positive and pertinent negative findings in ROS are noted above in HPI. The following systems were [] reviewed / [x] unable to be reviewed as noted in HPI  Other findings are noted below. Systems: constitutional, ears/nose/mouth/throat, respiratory, gastrointestinal, genitourinary, musculoskeletal, integumentary, neurologic, psychiatric, endocrine. Positive findings noted below. Modified ESAS Completed by: provider           Pain: 0           Dyspnea: 0           Stool Occurrence(s): 1        PHYSICAL EXAM:     Wt Readings from Last 3 Encounters:   02/26/18 54.7 kg (120 lb 9.5 oz)     Blood pressure 103/51, pulse 82, temperature 97.6 °F (36.4 °C), resp. rate 24, height 5' 5\" (1.651 m), weight 54.7 kg (120 lb 9.5 oz), SpO2 100 %, not currently breastfeeding.   Pain:  Pain Scale 1: Adult Nonverbal Pain Scale  Pain Intensity 1: 0     Pain Location 1: Head (states thi si not a new pain)  Pain Orientation 1: Right  Pain Description 1:  (unabel to describe)  Pain Intervention(s) 1: Medication (see MAR)  Last bowel movement: 2-26    Constitutional:thin white woman in bed with HOB elevated  Eyes: pupils equal, anicteric  ENMT: no nasal discharge, moist mucous membranes  Respiratory: breathing not labored, symmetric  Musculoskeletal: no deformity  Skin: warm, dry  Neurologic: answers some simple questions  Psychiatric: full affect, no hallucinations         HISTORY:     Principal Problem:    Shock ([address] (2/22/2018)      Overview: Urine may be a source of infection for possible septic shock; however,       SvO2 should be checked in light of abnormal troponin and pro-BNP,       suspicious for cardiogenic shock    Active Problems:    Acquired hypothyroidism (2/22/2018)      KILO (acute kidney injury) ([address] (2/22/2018)      Elevated troponin (2/22/2018)      Acute cystitis without hematuria (2/22/2018)      Dementia with behavioral disturbance (2/22/2018)      Elevated brain natriuretic peptide (BNP) level (2/22/2018)      Supraventricular tachycardia ([address] (2/22/2018)      Acute deep vein thrombosis (DVT) of femoral vein of left lower extremity ([address] (2/23/2018)      Underweight (2/25/2018)      History reviewed. No pertinent past medical history. History reviewed. No pertinent surgical history. Family History   Problem Relation Age of Onset    Family history unknown: Yes     History reviewed, no pertinent family history.   Social History   Substance Use Topics    Smoking status: Not on file    Smokeless tobacco: Not on file    Alcohol use Not on file     No Known Allergies   Current Facility-Administered Medications   Medication Dose Route Frequency    [START ON 2/28/2018] levothyroxine (SYNTHROID) tablet 75 mcg  75 mcg Oral 6am    vancomycin (VANCOCIN) 1,000 mg in 0.9% sodium chloride (MBP/ADV) 250 mL  1,000 mg IntraVENous Q12H    VANCOMYCIN INFORMATION NOTE   Other ONCE    ondansetron (ZOFRAN) injection 4 mg  4 mg IntraVENous Q6H PRN    heparin 25,000 units in D5W 250 ml infusion  18-36 Units/kg/hr IntraVENous TITRATE    piperacillin-tazobactam (ZOSYN) 4.5 g in 0.9% sodium chloride (MBP/ADV) 100 mL MBP ###EXTENDED 4 HOUR INFUSION###  4.5 g IntraVENous Q8H    VANCOMYCIN INFORMATION NOTE 1 Each  1 Each Other Rx Dosing/Monitoring    sodium chloride (NS) flush 5-10 mL  5-10 mL IntraVENous PRN    levoFLOXacin (LEVAQUIN) 500 mg in D5W IVPB  500 mg IntraVENous Q48H    aspirin delayed-release tablet 81 mg  81 mg Oral DAILY    atorvastatin (LIPITOR) tablet 40 mg  40 mg Oral DAILY    sodium chloride (NS) flush 5-10 mL  5-10 mL IntraVENous Q8H    sodium chloride (NS) flush 5-10 mL  5-10 mL IntraVENous PRN    acetaminophen (TYLENOL) tablet 650 mg  650 mg Oral Q6H PRN    pantoprazole (PROTONIX) 40 mg in sodium chloride 10 mL injection  40 mg IntraVENous DAILY        LAB AND IMAGING FINDINGS:     Lab Results   Component Value Date/Time    WBC 15.5 (H) 02/27/2018 02:50 AM    HGB 8.1 (L) 02/27/2018 02:50 AM    PLATELET 873 89/55/2721 02:50 AM     Lab Results   Component Value Date/Time    Sodium 147 (H) 02/27/2018 02:50 AM    Potassium 3.6 02/27/2018 02:50 AM    Chloride 118 (H) 02/27/2018 02:50 AM    CO2 21 02/27/2018 02:50 AM    BUN 11 02/27/2018 02:50 AM    Creatinine 0.53 (L) 02/27/2018 02:50 AM    Calcium 7.1 (L) 02/27/2018 02:50 AM    Magnesium 1.7 02/27/2018 02:50 AM    Phosphorus 2.3 (L) 02/27/2018 02:50 AM      Lab Results   Component Value Date/Time    AST (SGOT) 32 02/27/2018 02:50 AM    Alk.  phosphatase 155 (H) 02/27/2018 02:50 AM    Protein, total 4.8 (L) 02/27/2018 02:50 AM    Albumin 1.8 (L) 02/27/2018 02:50 AM    Globulin 3.0 02/27/2018 02:50 AM     Lab Results   Component Value Date/Time    aPTT 110.8 (H) 02/27/2018 12:40 PM      No results found for: IRON, FE, TIBC, IBCT, PSAT, FERR   No results found for: PH, PCO2, PO2  No components found for: [place]   Lab Results   Component Value Date/Time    CK 89 02/22/2018 10:30 PM    CK - MB 1.6 02/22/2018 10:30 PM              Total time: 50  Counseling / coordination time, spent as noted above: 45  > 50% counseling / coordination: yes with patient, provider, on phone with niece and son    Prolonged service was provided for  []30 min   []75 min in face to face time in the presence of the patient, spent as noted above. Time Start:   Time End:   Note: this can only be billed with 10810 (initial) or 74015 (follow up). If multiple start / stop times, list each separately.

## 2018-02-27 NOTE — PROGRESS NOTES
Spoke with RN at [facility] to complete patient's TB screening and influenza record. She states that the patient would not have been able to be admitted to [facility] without a negative TB screening and that she will ask the patient's primary nurse to contact the ICU w/ any records. 1220: Naseem Stoner from [facility], called to inform that patient did have a negative TB screening, and relayed the pertinent information for screening, but had no record of influenza screening or administration. Charted as such.

## 2018-02-27 NOTE — CDMP QUERY
Pt admitted with Sepsis/Shock, In Er documentation noted of Bruising Left Lower , Pvl on 2-23-18  revealed and Acute Dvt, for coding purposes, please document if you feel that this Dvt was     =>Acute Dvt of left lower Ext  present on admission   =>Other Explanation of clinical findings  =>Unable to Determine (no explanation of clinical findings)    The medical record reflects the following:    Risk: NH pt,  pt has dementia, low bp, jitendra    Clinical Indicators: ER documents  bruising noted Left lower leg, Pvl's + for Left DVT     Treatment:  Heparin     Please clarify and document your clinical opinion in the progress notes and discharge summary including the definitive and/or presumptive diagnosis, (suspected or probable), related to the above clinical findings. Please include clinical findings supporting your diagnosis. If you DECLINE this query or would like to communicate with [facility], please utilize the \"[employer] message box\" at the TOP of the Progress Note on the right.       Thank you,  Luisa Beck RN/CCDS  [phone]

## 2018-02-27 NOTE — PROGRESS NOTES
1930  Bedside SBAR report taken from [facility] 30. Patient lying in bed with no complaints. Just got washed up by offgoing RN for loose stools. Bryant was taken out, will be placing a pure wick. 2100  Patient bathed after having a loose stool and being incontinent of urine. Placed an FMS for his loose stool. Changed linens and gown. 0000  No changes. Patient has been sleeping on and off. No urine or stool since she was incontinent of both at beginning of shift    0630  Woke patent to check her skin and FMS. Underpad is dry. Placed her on her right side     0730  SBAR report given to incoming RN [employer].

## 2018-02-27 NOTE — PROGRESS NOTES
Per MD note, plan to transfer pt to tele floor. Spoke with Kuldip Telles, day nurse for pt at [facility] and informed pt will likely be ready for discharge in the next 24-48 hrs. Kuldip Telles stated someone will come to [place] [address] tomorrow to assess pt for appropriateness to return to [facility]. Per PT note, pt not able to follow commands. Will not be a good candidate for SNF. Discharge plan: Return to [facility] with home health.     Per Palliative note, plan for mtg with pt's NOK- her son, tomorrow 7am.

## 2018-02-27 NOTE — PROGRESS NOTES
[facility] Progress Note                                              I have reviewed the flowsheet and previous days notes. Events, vitals, medications and notes from last 24 hours reviewed. Care plan discussed with staff and on multidisciplinary rounds. Subjective:  2/27/2018   Pt denies dyspnea or chest pain. Impression and Plan  Shock - Improved. Likely etiology is cardiac. EV 1000 placed yesterday. SVV was 9, CI was 1.6. CVP is 10 today. Pt is off levo today. Will d/c both AH4280 and CVP. D/w 2026 [facility]. They dont have any further recs. Sepsis and Poss RLL pneumonia- WBC count is trending down though still elevated. Blood and urine cx show no growth. Pt is on Zosyn, Levaquin, vanco. Defer abx to ID  Systolic CHF with EF 14-68% with B/l Pleural effusions Rt>Lt - BNP is markedly elevated. Will start diuresis as pt is off pressors. Left leg DVT - on heparin drip per protocol. Change to a NOAC  Mildly elevated TI - Cardiology consulted and they think its related to stress cardiomyopathy. Pt is on ASA, Lipitor. No further recs per cardiology  Anemia - Hb is drifting down. No overt bleeding. Check FOBT, monitor CBC  DVT and GI proph - Pt is on Heparin and protonix    OTHER:  Glycemic Control. Glucose stabilizer per ICU protocol when on insulin drip. Maintain blood glucose 140-180. Replace electrolytes per ICU electrolyte replacement protocol  HOB >=30 degree elevation all the time. Aggressive pulmonary toileting. Incentive spirometry when appropriate. Aspiration precautions. PT/OT eval and treat. OOB/IS when appropriate. Quality Care: Stress ulcer prophylaxis, DVT prophylaxis, HOB elevated, Infection control all reviewed and addressed. Events and notes from last 24 hours reviewed. Care plan discussed with nursing. CC TIME: >40 min     Medication Reviewed:    No Known Allergies   History reviewed. No pertinent past medical history. History reviewed. No pertinent surgical history. Social History   Substance Use Topics    Smoking status: Not on file    Smokeless tobacco: Not on file    Alcohol use Not on file      Family History   Problem Relation Age of Onset    Family history unknown: Yes      Prior to Admission medications    Medication Sig Start Date End Date Taking? Authorizing Provider   aspirin delayed-release 81 mg tablet Take 81 mg by mouth daily. Historical Provider   atorvastatin (LIPITOR) 40 mg tablet Take 40 mg by mouth daily. Historical Provider   levothyroxine (SYNTHROID) 75 mcg tablet Take 75 mcg by mouth Daily (before breakfast). Historical Provider   MAGNESIUM OXIDE, BULK, Take 400 mg by mouth daily. Historical Provider   polyethylene glycol (MIRALAX) 17 gram packet Take 17 g by mouth daily. Historical Provider   MULTIVIT-MINERALS/FERROUS FUM (MULTI VITAMIN PO) Take 1 Tab by mouth daily. Historical Provider   memantine (NAMENDA) 10 mg tablet Take 21 mg by mouth daily. 2/23/18 3/8/18  Historical Provider   memantine (NAMENDA) 10 mg tablet Take 28 mg by mouth daily. 3/9/18   Historical Provider   calcium-vitamin D (OYSTER SHELL) 500 mg(1,250mg) -200 unit per tablet Take 1 Tab by mouth daily. Historical Provider   mirtazapine (REMERON) 7.5 mg tablet Take 7.5 mg by mouth nightly.     Historical Provider     Current Facility-Administered Medications   Medication Dose Route Frequency    [START ON 2/28/2018] levothyroxine (SYNTHROID) tablet 75 mcg  75 mcg Oral 6am    vancomycin (VANCOCIN) 1,000 mg in 0.9% sodium chloride (MBP/ADV) 250 mL  1,000 mg IntraVENous Q12H    VANCOMYCIN INFORMATION NOTE   Other ONCE    heparin 25,000 units in D5W 250 ml infusion  18-36 Units/kg/hr IntraVENous TITRATE    piperacillin-tazobactam (ZOSYN) 4.5 g in 0.9% sodium chloride (MBP/ADV) 100 mL MBP ###EXTENDED 4 HOUR INFUSION###  4.5 g IntraVENous Q8H    VANCOMYCIN INFORMATION NOTE 1 Each  1 Each Other Rx Dosing/Monitoring    levoFLOXacin (LEVAQUIN) 500 mg in D5W IVPB  500 mg IntraVENous Q48H    NOREPINephrine (LEVOPHED) 8 mg in 5% dextrose 250mL infusion  2-16 mcg/min IntraVENous TITRATE    aspirin delayed-release tablet 81 mg  81 mg Oral DAILY    atorvastatin (LIPITOR) tablet 40 mg  40 mg Oral DAILY    sodium chloride (NS) flush 5-10 mL  5-10 mL IntraVENous Q8H    pantoprazole (PROTONIX) 40 mg in sodium chloride 10 mL injection  40 mg IntraVENous DAILY       Lines: All central lines examined by me. No signs of erythema, induration, discharge. Central Venous Catheter:  Triple Lumen 02/22/18 Right Internal jugular (Active)   Central Line Being Utilized Yes 2/26/2018 12:00 PM   Criteria for Appropriate Use Hemodynamically unstable, requiring monitoring lines, vasopressors, or volume resuscitation 2/26/2018 12:00 PM   Site Assessment Clean, dry, & intact 2/26/2018 12:00 PM   Infiltration Assessment 0 2/26/2018 12:00 PM   Affected Extremity/Extremities Color distal to insertion site pink (or appropriate for race); Pulses palpable;Range of motion performed 2/26/2018 12:00 PM   Date of Last Dressing Change 02/25/18 2/26/2018 12:00 PM   Dressing Status Clean, dry, & intact 2/26/2018 12:00 PM   Dressing Type Disk with Chlorhexadine gluconate (CHG) 2/26/2018 12:00 PM   Action Taken Open ports on tubing capped 2/26/2018 12:00 PM   Proximal Hub Color/Line Status White; Infusing 2/26/2018 12:00 PM   Positive Blood Return (Medial Site) Yes 2/26/2018 12:00 PM   Medial Hub Color/Line Status Blue; Infusing 2/26/2018 12:00 PM   Positive Blood Return (Lateral Site) Yes 2/26/2018 12:00 PM   Distal Hub Color/Line Status Brown; Infusing 2/26/2018 12:00 PM   Positive Blood Return (Site #3) Yes 2/26/2018 12:00 PM   Alcohol Cap Used Yes 2/26/2018 12:00 PM     Objective:  Vital Signs:    Visit Vitals    /60    Pulse 91    Temp 97.6 °F (36.4 °C)    Resp 29    Ht 5' 5\" (1.651 m)    Wt 54.7 kg (120 lb 9.5 oz)    SpO2 100%    Breastfeeding No    BMI 20.07 kg/m2      O2 Device: Room air  O2 Flow Rate (L/min): 4 l/min  Temp (24hrs), Av.2 °F (36.8 °C), Min:97.6 °F (36.4 °C), Max:98.8 °F (37.1 °C)      Intake/Output:   Last shift:      701 - 1900  In: 227.1 [I.V.:227.1]  Out: 0     Last 3 shifts: 1901 -  0700  In: 2196.5 [I.V.:2196.5]  Out: 955 [Urine:955]      Intake/Output Summary (Last 24 hours) at 18 1106  Last data filed at 18 0800   Gross per 24 hour   Intake          1592.66 ml   Output              125 ml   Net          1467.66 ml       Last 3 Recorded Weights in this Encounter    18 0400 18 0600 18 0800   Weight: 58 kg (127 lb 13.9 oz) 60.1 kg (132 lb 7.9 oz) 54.7 kg (120 lb 9.5 oz)     Physical Exam:     General/Neurology: Alert, Awake,   Head:   Normocephalic, without obvious abnormality  Eye:   PERRL, EOM intact, no scleral icterus, no pallor  Oral:   Mucus membranes moist  Neck:   Supple, No lymphadenopathy  Lung:   B/l decreased air entry, b/l basal rales present  Heart:   Regular rate & rhythm. S1 S2 present. Abdomen/: Soft. NT. ND. +BS.     Extremities:  2-3+ pedal edema  Skin:   No rashes or lesions    Data:      Recent Results (from the past 24 hour(s))   PTT    Collection Time: 18 11:15 AM   Result Value Ref Range    aPTT 58.2 (H) 23.0 - 36.4 SEC   PTT    Collection Time: 18  5:00 PM   Result Value Ref Range    aPTT 150.3 (HH) 23.0 - 36.4 SEC   POTASSIUM    Collection Time: 18  5:00 PM   Result Value Ref Range    Potassium 3.7 3.5 - 5.5 mmol/L   CORTISOL, AM    Collection Time: 18  2:50 AM   Result Value Ref Range    Cortisol, a.m. 14.9 4.30 - 22.40 ug/dL   CBC WITH AUTOMATED DIFF    Collection Time: 18  2:50 AM   Result Value Ref Range    WBC 15.5 (H) 4.6 - 13.2 K/uL    RBC 2.96 (L) 4.20 - 5.30 M/uL    HGB 8.1 (L) 12.0 - 16.0 g/dL    HCT 25.5 (L) 35.0 - 45.0 %    MCV 86.1 74.0 - 97.0 FL    MCH 27.4 24.0 - 34.0 PG    MCHC 31.8 31.0 - 37.0 g/dL    RDW 16.4 (H) 11.6 - 14.5 %    PLATELET 669 150 - 420 K/uL    MPV 11.8 9.2 - 11.8 FL    NEUTROPHILS 81 (H) 42 - 75 %    LYMPHOCYTES 13 (L) 20 - 51 %    MONOCYTES 4 2 - 9 %    EOSINOPHILS 2 0 - 5 %    BASOPHILS 0 0 - 3 %    ABS. NEUTROPHILS 12.6 (H) 1.8 - 8.0 K/UL    ABS. LYMPHOCYTES 2.0 0.8 - 3.5 K/UL    ABS. MONOCYTES 0.6 0 - 1.0 K/UL    ABS. EOSINOPHILS 0.3 0.0 - 0.4 K/UL    ABS. BASOPHILS 0.0 0.0 - 0.1 K/UL    RBC COMMENTS ANISOCYTOSIS  1+        DF MANUAL     MAGNESIUM    Collection Time: 02/27/18  2:50 AM   Result Value Ref Range    Magnesium 1.7 1.6 - 2.6 mg/dL   PHOSPHORUS    Collection Time: 02/27/18  2:50 AM   Result Value Ref Range    Phosphorus 2.3 (L) 2.5 - 4.9 MG/DL   NT-PRO BNP    Collection Time: 02/27/18  2:50 AM   Result Value Ref Range    NT pro-BNP >74429 (H) 0 - 2173 PG/ML   METABOLIC PANEL, COMPREHENSIVE    Collection Time: 02/27/18  2:50 AM   Result Value Ref Range    Sodium 147 (H) 136 - 145 mmol/L    Potassium 3.6 3.5 - 5.5 mmol/L    Chloride 118 (H) 100 - 108 mmol/L    CO2 21 21 - 32 mmol/L    Anion gap 8 3.0 - 18 mmol/L    Glucose 95 74 - 99 mg/dL    BUN 11 7.0 - 18 MG/DL    Creatinine 0.53 (L) 0.6 - 1.3 MG/DL    BUN/Creatinine ratio 21 (H) 12 - 20      GFR est AA >60 >60 ml/min/1.73m2    GFR est non-AA >60 >60 ml/min/1.73m2    Calcium 7.1 (L) 8.5 - 10.1 MG/DL    Bilirubin, total 0.4 0.2 - 1.0 MG/DL    ALT (SGPT) 17 13 - 56 U/L    AST (SGOT) 32 15 - 37 U/L    Alk.  phosphatase 155 (H) 45 - 117 U/L    Protein, total 4.8 (L) 6.4 - 8.2 g/dL    Albumin 1.8 (L) 3.4 - 5.0 g/dL    Globulin 3.0 2.0 - 4.0 g/dL    A-G Ratio 0.6 (L) 0.8 - 1.7     PTT    Collection Time: 02/27/18  2:50 AM   Result Value Ref Range    aPTT 55.3 (H) 23.0 - 36.4 SEC         Chemistry   Recent Labs      02/27/18   0250  02/26/18   1700  02/26/18   0515  02/25/18   2230   02/25/18   0520   GLU  95   --   107*   --    --   78   NA  147*   --   147*   --    --   147*   K  3.6  3.7  3.7  3.7   < >  3.9   CL  118*   --   117*   --    --   117*   CO2  21   --   22   --    --   21 BUN  11   --   16   --    --   16   CREA  0.53*   --   0.77   --    --   0.91   CA  7.1*   --   7.2*   --    --   7.0*   MG  1.7   --    --   2.0   --   1.7   PHOS  2.3*   --   2.4*   --    --   2.2*   AGAP  8   --   8   --    --   9   BUCR  21*   --   21*   --    --   18   AP  155*   --   165*   --    --   129*   TP  4.8*   --   4.8*   --    --   4.6*   ALB  1.8*   --   1.4*   --    --   1.4*   GLOB  3.0   --   3.4   --    --   3.2   AGRAT  0.6*   --   0.4*   --    --   0.4*    < > = values in this interval not displayed. CBC w/Diff   Recent Labs      02/27/18   0250  02/26/18   0515  02/25/18   0520   WBC  15.5*  15.3*  21.4*   RBC  2.96*  3.32*  3.55*   HGB  8.1*  9.3*  9.8*   HCT  25.5*  28.5*  31.0*   PLT  257  212  230   GRANS  81*  72  84*   LYMPH  13*  16*  8*   EOS  2  5  4     XR (Most Recent). CXR reviewed by me and compared with previous CXR     Results from Hospital Encounter encounter on 02/22/18   XR CHEST PORT   Narrative Chest, single view    Indication: Dyspnea    Comparison: February 24, 2018    Findings:  Portable upright AP view of the chest was obtained. Right IJ  approach central venous catheter remains unchanged with the tip resting over the  inferior vena cava near the cavoatrial junction. The cardiomediastinal  silhouette is unchanged and remains obscured by bibasilar airspace opacities,  layering effusions and atelectasis, worse on the right. There is slight improved  aeration of the lung apices. Impression Impression:  Slight improved aeration of the lung apices suggesting decreased interstitial  edema. Otherwise, little interval change in the large right and at least small  left pleural effusions with associated atelectasis and possible superimposed  pneumonia.           High complexity decision making was performed during the evaluation of this patient at high risk for decompensation with multiple organ involvement     Above mentioned total time spent on reviewing the case/medical record/data/notes/EMR/patient examination/documentation/coordinating care with nurse/consultants, exclusive of procedures with complex decision making performed and > 50% time spent in face to face evaluation.     Vincenzo Shelton MD  2/27/2018

## 2018-02-28 NOTE — PROGRESS NOTES
[facility]: [phone] ([phone])  [facility]: [phone]   [facility]: [phone]    Patient Name: Dante Rodriguez  YOB: 1928    Date of Initial Consult: 2/27/18  Reason for Consult: care decisions  Requesting Provider: Dr. Ej Torrez  Primary Care Physician: Irma Bentley MD      SUMMARY:   Dante Rodriguez is a 80y.o. year old with a past history of dementia and hypothyroidism, who was admitted on 2/22/2018 from [facility] with a diagnosis of sepsis. Current medical issues leading to Palliative Medicine involvement include: 81 y/o [facility] resident with dementia, admitted to ICU with sepsis, on pressors. Palliative care is consulted for goals of care discussion. PALLIATIVE DIAGNOSES:   1. ACP/goals of care discussion  2. Dementia  3. shock  4. DVT  5. Elevated BNP  6. debility       PLAN:   1. Patient seen this am, asleep when I walked into room, easily arouses to her name. Told me she was cold. BSRN also present. Oriented to herself. Social;  for 4 years, 3 children one daughter who lives locally, unfortunately she had a recent fall resulting in a brain injury, 2 sons, Pema Zafar lives in [place], Jessica Palacios in [place]. Retired a . 2. Advanced care plan discussions patient is not able to participate in conversation. We do not have an AMD on file. Daughter Pepper Keys was care giver before Vicky Sood moved to [facility]. Medical decision making is a consensus between children, however daughter sustained a brain injury after a recent fall, not able to participate in decisions, son Jessica Palacios lives in [place], per Pema Zafar, has basically been estranged from family, he does not own a phone, not great way to contact him, however Pema Zafar will try through neighbors. Pema Zafar is by default the medical decision maker. Lengthy discussion concerning progression of dementia and effects.  Pema Zafar tells me he understands as his father passed away from complications of dementia. Goals of care; no heroics at end of life, no chest compressions, no shock, no intubation for any reason. Per Mimi Zheng \" Mom would not want to live on machines\" DNR/DNI. DDNR signed by Mimi Zheng. Care decisions; updated Mimi Zheng on current medical condition, including probable stress cardiomyopathy, not a good candidate for invasive w/u. Speech eval noted and appreciated; tolerates thins and puree but oral intake is not robust, shared this with Mimi Zheng, who would like for his mother to enjoy oral intake, but would not place any feeding tubes, temporary or permanent. At present continue all other best supportive medical treatments. 3. Dementia: Discussed with Mimi Zheng effects of dementia, he is aware it is a progressive diease. Last saw his mother in June ( he lives in [place]) she was able to perform many of her ADL's at that time, but not safe to live alone, and at times would not know him. He understands the disease has progressed since that time, as she has required [facility] care in a dementia unit. 4. DVT: LLE, present on admission, heparin drip, discussed with Mimi Zheng, including concerns over o/p AC due to fall risk  5. Elevated BNP: cath placed yesterday, o/p 325 of urine. May need diuresing PCCM and cards on board. 6. Debility: Per  notes patient has been living in the [facility] for about 2 weeks and is dependent for ADLs, seen by SLP and safe for puree diet and thin liquids. Prior to [facility] daughter was living with her. 7. Initial consult note routed to primary continuity provider  8.  Communicated plan of care with: Palliative IDT, son Carley Ernandez:  Patient/Health Care Proxy Stated Goals:  (To be determined)      TREATMENT PREFERENCES:   Code Status: DNR    Advance Care Planning:  Advance Care Planning 2/27/2018   Patient's Healthcare Decision Maker is: Legal Next of Marvel Araiza   Primary Decision Maker Name Becky Gregory   Primary Decision Maker Phone Number [phone] or [phone] Primary Decision Maker Relationship to Patient Adult child   Confirm Advance Directive -   Patient Would Like to Complete Advance Directive Unable               Other:  As far as possible, the palliative care team has discussed with patient / health care proxy about goals of care / treatment preferences for patient. HISTORY:     History obtained from: chart    CHIEF COMPLAINT:fatigue/weakness/loose stools    HPI/SUBJECTIVE:    The patient is:   [] Verbal and participatory  [x] Non-participatory due to:   Confusion/dementia     Clinical Pain Assessment (nonverbal scale for nonverbal patients): Clinical Pain Assessment  Severity: 0     Activity (Movement): Lying quietly, normal position    Duration: for how long has pt been experiencing pain (e.g., 2 days, 1 month, years)  Frequency: how often pain is an issue (e.g., several times per day, once every few days, constant)     FUNCTIONAL ASSESSMENT:     Palliative Performance Scale (PPS):  PPS: 30      ECOG Status : Completely disabled     PSYCHOSOCIAL/SPIRITUAL SCREENING:      Any spiritual / [employer]ian concerns:  [] Yes /  [] No    Caregiver Burnout:  [] Yes /  [] No /  [x] No Caregiver Present      Anticipatory grief assessment:   [] Normal  / [] Maladaptive        REVIEW OF SYSTEMS:     Positive and pertinent negative findings in ROS are noted above in HPI. The following systems were [] reviewed / [x] unable to be reviewed as noted in HPI  Other findings are noted below. Systems: constitutional, ears/nose/mouth/throat, respiratory, gastrointestinal, genitourinary, musculoskeletal, integumentary, neurologic, psychiatric, endocrine. Positive findings noted below. Modified ESAS Completed by: provider           Pain: 0           Dyspnea: 0     Constipation: No     Stool Occurrence(s): 1        PHYSICAL EXAM:     Wt Readings from Last 3 Encounters:   02/27/18 56 kg (123 lb 7.3 oz)     Blood pressure 118/87, pulse 83, temperature 96.5 °F (35.8 °C), resp.  rate 19, height 5' 5\" (1.651 m), weight 56 kg (123 lb 7.3 oz), SpO2 99 %, not currently breastfeeding. Pain:  Pain Scale 1: Numeric (0 - 10)  Pain Intensity 1: 0     Pain Location 1: Head (states thi si not a new pain)  Pain Orientation 1: Right  Pain Description 1:  (unabel to describe)  Pain Intervention(s) 1: Medication (see MAR)  Last bowel movement: FMS     Constitutional:elderly confused frail appearing female lying in bed in NAD  Eyes: pupils equal, anicteric  ENMT: no nasal discharge, moist mucous membranes  Respiratory: breathing not labored, symmetric  Musculoskeletal: no deformity  Skin: warm, dry  Neurologic: alert,oriented to herself, calm        HISTORY:     Principal Problem:    Shock ([address] (2/22/2018)      Overview: Urine may be a source of infection for possible septic shock; however,       SvO2 should be checked in light of abnormal troponin and pro-BNP,       suspicious for cardiogenic shock    Active Problems:    Acquired hypothyroidism (2/22/2018)      KILO (acute kidney injury) ([address] (2/22/2018)      Elevated troponin (2/22/2018)      Acute cystitis without hematuria (2/22/2018)      Dementia with behavioral disturbance (2/22/2018)      Elevated brain natriuretic peptide (BNP) level (2/22/2018)      Supraventricular tachycardia ([address] (2/22/2018)      Acute deep vein thrombosis (DVT) of femoral vein of left lower extremity ([address] (2/23/2018)      Underweight (2/25/2018)      History reviewed. No pertinent past medical history. History reviewed. No pertinent surgical history. Family History   Problem Relation Age of Onset    Family history unknown: Yes     History reviewed, no pertinent family history.   Social History   Substance Use Topics    Smoking status: Not on file    Smokeless tobacco: Not on file    Alcohol use Not on file     No Known Allergies   Current Facility-Administered Medications   Medication Dose Route Frequency    potassium chloride 10 mEq in 100 ml IVPB  10 mEq IntraVENous Q1H  levothyroxine (SYNTHROID) tablet 75 mcg  75 mcg Oral 6am    warfarin (COUMADIN) tablet 4 mg  4 mg Oral QPM    vancomycin (VANCOCIN) 750 mg in 0.9% sodium chloride (MBP/ADV) 250 mL ADV  750 mg IntraVENous Q12H    [START ON 3/1/2018] VANCOMYCIN INFORMATION NOTE   Other ONCE    ondansetron (ZOFRAN) injection 4 mg  4 mg IntraVENous Q6H PRN    heparin 25,000 units in D5W 250 ml infusion  18-36 Units/kg/hr IntraVENous TITRATE    piperacillin-tazobactam (ZOSYN) 4.5 g in 0.9% sodium chloride (MBP/ADV) 100 mL MBP ###EXTENDED 4 HOUR INFUSION###  4.5 g IntraVENous Q8H    VANCOMYCIN INFORMATION NOTE 1 Each  1 Each Other Rx Dosing/Monitoring    sodium chloride (NS) flush 5-10 mL  5-10 mL IntraVENous PRN    levoFLOXacin (LEVAQUIN) 500 mg in D5W IVPB  500 mg IntraVENous Q48H    aspirin delayed-release tablet 81 mg  81 mg Oral DAILY    atorvastatin (LIPITOR) tablet 40 mg  40 mg Oral DAILY    sodium chloride (NS) flush 5-10 mL  5-10 mL IntraVENous Q8H    sodium chloride (NS) flush 5-10 mL  5-10 mL IntraVENous PRN    acetaminophen (TYLENOL) tablet 650 mg  650 mg Oral Q6H PRN    pantoprazole (PROTONIX) 40 mg in sodium chloride 10 mL injection  40 mg IntraVENous DAILY        LAB AND IMAGING FINDINGS:     Lab Results   Component Value Date/Time    WBC 8.5 02/28/2018 04:20 AM    HGB 7.4 (L) 02/28/2018 04:20 AM    PLATELET 503 82/24/6659 04:20 AM     Lab Results   Component Value Date/Time    Sodium 148 (H) 02/28/2018 04:20 AM    Potassium 3.4 (L) 02/28/2018 04:20 AM    Chloride 117 (H) 02/28/2018 04:20 AM    CO2 21 02/28/2018 04:20 AM    BUN 7 02/28/2018 04:20 AM    Creatinine 0.39 (L) 02/28/2018 04:20 AM    Calcium 7.1 (L) 02/28/2018 04:20 AM    Magnesium 2.0 02/27/2018 04:00 PM    Phosphorus 2.3 (L) 02/27/2018 02:50 AM      Lab Results   Component Value Date/Time    AST (SGOT) 32 02/27/2018 02:50 AM    Alk.  phosphatase 155 (H) 02/27/2018 02:50 AM    Protein, total 4.8 (L) 02/27/2018 02:50 AM    Albumin 1.8 (L) 02/27/2018 02:50 AM    Globulin 3.0 02/27/2018 02:50 AM     Lab Results   Component Value Date/Time    INR 1.4 (H) 02/28/2018 04:20 AM    Prothrombin time 17.0 (H) 02/28/2018 04:20 AM    aPTT 88.9 (H) 02/28/2018 04:20 AM      No results found for: IRON, FE, TIBC, IBCT, PSAT, FERR   No results found for: PH, PCO2, PO2  No components found for: [place]   Lab Results   Component Value Date/Time    CK 89 02/22/2018 10:30 PM    CK - MB 1.6 02/22/2018 10:30 PM              Total time: 65 minutes   Counseling / coordination time, spent as noted above: 45  > 50% counseling / coordination: yes with RN, Kb Wan    Prolonged service was provided for  [x]30 min   []75 min in face to face time in the presence of the patient, spent as noted above. Time Start:  0700  Time End:   0815  Note: this can only be billed with  (initial) or 21 [phone] (follow up). If multiple start / stop times, list each separately.

## 2018-02-28 NOTE — PROGRESS NOTES
IDR Summary      Patient: Patti Green MRN: [id number]    Age: 80 y.o.  : 3/9/1928     Admit Diagnosis: Sepsis associated hypotension ([address]      Problems pertinent to hospital stay: transferred from ICU to [facility] today     Consults:P.T, O.T. and Case Management     Testing due for patient today? NO    Nutrition plan:Yes     Mobility needs: Yes      Lines/Tubes:   IV: YES   Needed: YES  Bryant: NO  Needed:NO  Central Line: YES Needed: NO - needs PIV prior to pulling CVL     VTE Prophylaxis: Chemical    Influenza Vaccine received? NO        Care Management:  Discharge plan: back to [facility]   PCP: Nasra Carcamo MD    : NO  Financial concerns:No   Interventions:       LOS: 6 days     Expected days until discharge:  TBD       Signed:      [facility]  Hospitalist Division  Pager:  [phone]  Office:  [phone]

## 2018-02-28 NOTE — PROGRESS NOTES
Progress Note      Patient: Silviano Oar               Sex: female          DOA: 2/22/2018       YOB: 1928      Age:  80 y.o.        LOS:  LOS: 6 days               Subjective:   Pt is awake and is responsive  . her nt probnp is elevated at > 35,000. chest x ray  shows   Decreased  Bilateral  Pleural effusions  and lower lobe areas of atelectasis the patient is on iv lasix.  She is on eliquis for her dvt 2 d echo shows an ef of 45 - 50 % with  significant wall motion abnormalities the wall  thickness  Was mildly to moderately increased       Objective:      Visit Vitals    /66    Pulse 83    Temp 96.7 °F (35.9 °C)    Resp 19    Ht 5' 5\" (1.651 m)    Wt 56 kg (123 lb 7.3 oz)    SpO2 100%    Breastfeeding No    BMI 20.54 kg/m2       Physical Exam:  Pt is awake and denies any sob   Heart reg rate and rhythm   Lungs fair breath sounds heard   Abdomen soft and nontender   Extremities left leg dvt   Neuro dementia     Lab/Data Reviewed:  BMP:   Lab Results   Component Value Date/Time     (H) 02/28/2018 04:20 AM    K 3.4 (L) 02/28/2018 04:20 AM     (H) 02/28/2018 04:20 AM    CO2 21 02/28/2018 04:20 AM    AGAP 10 02/28/2018 04:20 AM    GLU 76 02/28/2018 04:20 AM    BUN 7 02/28/2018 04:20 AM    CREA 0.39 (L) 02/28/2018 04:20 AM    GFRAA >60 02/28/2018 04:20 AM    GFRNA >60 02/28/2018 04:20 AM     CMP:   Lab Results   Component Value Date/Time     (H) 02/28/2018 04:20 AM    K 3.4 (L) 02/28/2018 04:20 AM     (H) 02/28/2018 04:20 AM    CO2 21 02/28/2018 04:20 AM    AGAP 10 02/28/2018 04:20 AM    GLU 76 02/28/2018 04:20 AM    BUN 7 02/28/2018 04:20 AM    CREA 0.39 (L) 02/28/2018 04:20 AM    GFRAA >60 02/28/2018 04:20 AM    GFRNA >60 02/28/2018 04:20 AM    CA 7.1 (L) 02/28/2018 04:20 AM    MG 2.0 02/27/2018 04:00 PM     CBC:   Lab Results   Component Value Date/Time    WBC 8.5 02/28/2018 04:20 AM    HGB 7.4 (L) 02/28/2018 04:20 AM    HCT 23.6 (L) 02/28/2018 04:20 AM     02/28/2018 04:20 AM           Assessment/Plan     Principal Problem:    Shock ([address] (2/22/2018)      Overview: Urine may be a source of infection for possible septic shock; however,       SvO2 should be checked in light of abnormal troponin and pro-BNP,       suspicious for cardiogenic shock    Active Problems:    Acquired hypothyroidism (2/22/2018)      KILO (acute kidney injury) ([address] (2/22/2018)      Elevated troponin (2/22/2018)      Acute cystitis without hematuria (2/22/2018)      Dementia with behavioral disturbance (2/22/2018)      Elevated brain natriuretic peptide (BNP) level (2/22/2018)      Supraventricular tachycardia ([address] (2/22/2018)      Acute deep vein thrombosis (DVT) of femoral vein of left lower extremity ([address] (2/23/2018)      Underweight (2/25/2018)        Plan: will continue to monitor her chf . Repeat chest x ray in the am

## 2018-02-28 NOTE — PROGRESS NOTES
Pt transferred out of ICU to . RN received phone call [facility] today stating they will be in to evaluate pt for appropriateness to return. Results of that assessment unknown at this time. Per request of Adam Hoover Referral sent to [facility]. Discharge plan: [address] [place] [address] vs [facility].

## 2018-02-28 NOTE — PROGRESS NOTES
13:30- Patient transferred to room 9599 [id number] from ICU. Assessment completed- see charting. Patient has no c/o at this time. Continue to monitor. Call light in reach. Bed exit alarm on.   15:00- Attempted to insert PIV without success-notified PICC nurse. 15:30- PICC line nurse inserted a 20g IV in left AC.   17:00- No change in condition. Continue to monitor. 19:20- Report to oncoming nurse.

## 2018-02-28 NOTE — PROGRESS NOTES
PCCM Progress Note                                              I have reviewed the flowsheet and previous days notes. Events, vitals, medications and notes from last 24 hours reviewed. Care plan discussed with staff and on multidisciplinary rounds. Subjective:  2/28/2018   Pt says she is feeling ok. Does not c/o dyspnea, cough or chest pain. Impression and Plan  Shock - Resolved. Pt is off pressors. D/w [facility]. They dont have any further recs. Sepsis and Poss RLL pneumonia- WBC count is normal today. Blood and urine cx show no growth. Pt was on Zosyn, Levaquin, vanco. Taper abx per ID  Systolic CHF with EF 57-77% with B/l Pleural effusions - Pt's Cxr is improving but BNP is still markedly elevated. Diurese with lasix  Left leg DVT - Start Eliquis. D/w Jameel Saini and he is ok with it. Pt is complete assist per PT and at the NH  Mildly elevated TI - Cardiology consulted and they think its related to stress cardiomyopathy. Pt is on ASA, Lipitor. No further recs per cardiology  Anemia - Hb is drifting down. No overt bleeding. Check FOBT, monitor CBC  DVT and GI proph - Pt is on eliquis and protonix    OTHER:  Glycemic Control. Glucose stabilizer per ICU protocol when on insulin drip. Maintain blood glucose 140-180. Replace electrolytes per ICU electrolyte replacement protocol  HOB >=30 degree elevation all the time. Aggressive pulmonary toileting. Incentive spirometry when appropriate. Aspiration precautions. PT/OT eval and treat. OOB/IS when appropriate. Quality Care: Stress ulcer prophylaxis, DVT prophylaxis, HOB elevated, Infection control all reviewed and addressed. Events and notes from last 24 hours reviewed. Care plan discussed with nursing. CC TIME: >35 min     Medication Reviewed:    No Known Allergies   History reviewed. No pertinent past medical history. History reviewed. No pertinent surgical history.    Social History   Substance Use Topics    Smoking status: Not on file    Smokeless tobacco: Not on file    Alcohol use Not on file      Family History   Problem Relation Age of Onset    Family history unknown: Yes      Prior to Admission medications    Medication Sig Start Date End Date Taking? Authorizing Provider   aspirin delayed-release 81 mg tablet Take 81 mg by mouth daily. Historical Provider   atorvastatin (LIPITOR) 40 mg tablet Take 40 mg by mouth daily. Historical Provider   levothyroxine (SYNTHROID) 75 mcg tablet Take 75 mcg by mouth Daily (before breakfast). Historical Provider   MAGNESIUM OXIDE, BULK, Take 400 mg by mouth daily. Historical Provider   polyethylene glycol (MIRALAX) 17 gram packet Take 17 g by mouth daily. Historical Provider   MULTIVIT-MINERALS/FERROUS FUM (MULTI VITAMIN PO) Take 1 Tab by mouth daily. Historical Provider   memantine (NAMENDA) 10 mg tablet Take 21 mg by mouth daily. 2/23/18 3/8/18  Historical Provider   memantine (NAMENDA) 10 mg tablet Take 28 mg by mouth daily. 3/9/18   Historical Provider   calcium-vitamin D (OYSTER SHELL) 500 mg(1,250mg) -200 unit per tablet Take 1 Tab by mouth daily. Historical Provider   mirtazapine (REMERON) 7.5 mg tablet Take 7.5 mg by mouth nightly. Historical Provider     Current Facility-Administered Medications   Medication Dose Route Frequency    levoFLOXacin (LEVAQUIN) tablet 500 mg  500 mg Oral Q24H    apixaban (ELIQUIS) tablet 10 mg  10 mg Oral BID    Followed by   Annabelle Le ON 3/7/2018] apixaban (ELIQUIS) tablet 5 mg  5 mg Oral BID    levothyroxine (SYNTHROID) tablet 75 mcg  75 mcg Oral 6am    aspirin delayed-release tablet 81 mg  81 mg Oral DAILY    atorvastatin (LIPITOR) tablet 40 mg  40 mg Oral DAILY    sodium chloride (NS) flush 5-10 mL  5-10 mL IntraVENous Q8H    pantoprazole (PROTONIX) 40 mg in sodium chloride 10 mL injection  40 mg IntraVENous DAILY       Lines: All central lines examined by me. No signs of erythema, induration, discharge.        Central Venous Catheter:  Triple Lumen 18 Right Internal jugular (Active)   Central Line Being Utilized Yes 2018 12:00 PM   Criteria for Appropriate Use Hemodynamically unstable, requiring monitoring lines, vasopressors, or volume resuscitation 2018 12:00 PM   Site Assessment Clean, dry, & intact 2018 12:00 PM   Infiltration Assessment 0 2018 12:00 PM   Affected Extremity/Extremities Color distal to insertion site pink (or appropriate for race); Pulses palpable;Range of motion performed 2018 12:00 PM   Date of Last Dressing Change 18 12:00 PM   Dressing Status Clean, dry, & intact 2018 12:00 PM   Dressing Type Disk with Chlorhexadine gluconate (CHG) 2018 12:00 PM   Action Taken Open ports on tubing capped 2018 12:00 PM   Proximal Hub Color/Line Status White; Infusing 2018 12:00 PM   Positive Blood Return (Medial Site) Yes 2018 12:00 PM   Medial Hub Color/Line Status Blue; Infusing 2018 12:00 PM   Positive Blood Return (Lateral Site) Yes 2018 12:00 PM   Distal Hub Color/Line Status Brown; Infusing 2018 12:00 PM   Positive Blood Return (Site #3) Yes 2018 12:00 PM   Alcohol Cap Used Yes 2018 12:00 PM     Objective:  Vital Signs:    Visit Vitals    /87    Pulse 83    Temp 96.5 °F (35.8 °C)    Resp 19    Ht 5' 5\" (1.651 m)    Wt 56 kg (123 lb 7.3 oz)    SpO2 99%    Breastfeeding No    BMI 20.54 kg/m2      O2 Device: Room air  O2 Flow Rate (L/min): 4 l/min  Temp (24hrs), Av.5 °F (36.4 °C), Min:96.5 °F (35.8 °C), Max:98 °F (36.7 °C)      Intake/Output:   Last shift:           Last 3 shifts:  1901 -  0700  In: 1799.3 [I.V.:1799.3]  Out: 800 [Urine:700; Drains:100]      Intake/Output Summary (Last 24 hours) at 18 1111  Last data filed at 18 0700   Gross per 24 hour   Intake           700.39 ml   Output              675 ml   Net            25.39 ml       Last 3 Recorded Weights in this Encounter    18 0600 02/26/18 0800 02/27/18 2000   Weight: 60.1 kg (132 lb 7.9 oz) 54.7 kg (120 lb 9.5 oz) 56 kg (123 lb 7.3 oz)     Physical Exam:     General/Neurology: Alert, Awake,   Head:   Normocephalic, without obvious abnormality  Eye:   PERRL, EOM intact, no scleral icterus, no pallor  Oral:   Mucus membranes moist  Neck:   Supple, No lymphadenopathy  Lung:   B/l decreased air entry, b/l few basal rales present  Heart:   Regular rate & rhythm. S1 S2 present. Abdomen/: Soft. NT. ND. +BS. Extremities:  2+ pedal edema  Skin:   No rashes or lesions    Data:      Recent Results (from the past 24 hour(s))   PTT    Collection Time: 02/27/18 12:40 PM   Result Value Ref Range    aPTT 110.8 (H) 23.0 - 36.4 SEC   VANCOMYCIN, TROUGH    Collection Time: 02/27/18  4:00 PM   Result Value Ref Range    Vancomycin,trough 22.2 (HH) 10.0 - 20.0 ug/mL    Reported dose date: 2272018      Reported dose time: 510      Reported dose: 1000MG IVQ12H UNITS   POTASSIUM    Collection Time: 02/27/18  4:00 PM   Result Value Ref Range    Potassium 3.9 3.5 - 5.5 mmol/L   MAGNESIUM    Collection Time: 02/27/18  4:00 PM   Result Value Ref Range    Magnesium 2.0 1.6 - 2.6 mg/dL   PTT    Collection Time: 02/27/18  6:30 PM   Result Value Ref Range    aPTT 89.3 (H) 23.0 - 36.4 SEC   PTT    Collection Time: 02/28/18  4:20 AM   Result Value Ref Range    aPTT 88.9 (H) 23.0 - 36.4 SEC   CBC WITH AUTOMATED DIFF    Collection Time: 02/28/18  4:20 AM   Result Value Ref Range    WBC 8.5 4.6 - 13.2 K/uL    RBC 2.74 (L) 4.20 - 5.30 M/uL    HGB 7.4 (L) 12.0 - 16.0 g/dL    HCT 23.6 (L) 35.0 - 45.0 %    MCV 86.1 74.0 - 97.0 FL    MCH 27.0 24.0 - 34.0 PG    MCHC 31.4 31.0 - 37.0 g/dL    RDW 16.8 (H) 11.6 - 14.5 %    PLATELET 007 222 - 610 K/uL    MPV 12.0 (H) 9.2 - 11.8 FL    NEUTROPHILS 67 40 - 73 %    LYMPHOCYTES 18 (L) 21 - 52 %    MONOCYTES 7 3 - 10 %    EOSINOPHILS 8 (H) 0 - 5 %    BASOPHILS 0 0 - 2 %    ABS. NEUTROPHILS 5.7 1.8 - 8.0 K/UL    ABS.  LYMPHOCYTES 1.6 0.9 - 3.6 K/UL    ABS. MONOCYTES 0.6 0.05 - 1.2 K/UL    ABS. EOSINOPHILS 0.7 (H) 0.0 - 0.4 K/UL    ABS. BASOPHILS 0.0 0.0 - 0.06 K/UL    DF AUTOMATED     METABOLIC PANEL, BASIC    Collection Time: 02/28/18  4:20 AM   Result Value Ref Range    Sodium 148 (H) 136 - 145 mmol/L    Potassium 3.4 (L) 3.5 - 5.5 mmol/L    Chloride 117 (H) 100 - 108 mmol/L    CO2 21 21 - 32 mmol/L    Anion gap 10 3.0 - 18 mmol/L    Glucose 76 74 - 99 mg/dL    BUN 7 7.0 - 18 MG/DL    Creatinine 0.39 (L) 0.6 - 1.3 MG/DL    BUN/Creatinine ratio 18 12 - 20      GFR est AA >60 >60 ml/min/1.73m2    GFR est non-AA >60 >60 ml/min/1.73m2    Calcium 7.1 (L) 8.5 - 10.1 MG/DL   NT-PRO BNP    Collection Time: 02/28/18  4:20 AM   Result Value Ref Range    NT pro-BNP >64269 (H) 0 - 1800 PG/ML   PROTHROMBIN TIME + INR    Collection Time: 02/28/18  4:20 AM   Result Value Ref Range    Prothrombin time 17.0 (H) 11.5 - 15.2 sec    INR 1.4 (H) 0.8 - 1.2           Chemistry   Recent Labs      02/28/18   0420  02/27/18   1600  02/27/18   0250   02/26/18   0515  02/25/18   2230   GLU  76   --   95   --   107*   --    NA  148*   --   147*   --   147*   --    K  3.4*  3.9  3.6   < >  3.7  3.7   CL  117*   --   118*   --   117*   --    CO2  21   --   21   --   22   --    BUN  7   --   11   --   16   --    CREA  0.39*   --   0.53*   --   0.77   --    CA  7.1*   --   7.1*   --   7.2*   --    MG   --   2.0  1.7   --    --   2.0   PHOS   --    --   2.3*   --   2.4*   --    AGAP  10   --   8   --   8   --    BUCR  18   --   21*   --   21*   --    AP   --    --   155*   --   165*   --    TP   --    --   4.8*   --   4.8*   --    ALB   --    --   1.8*   --   1.4*   --    GLOB   --    --   3.0   --   3.4   --    AGRAT   --    --   0.6*   --   0.4*   --     < > = values in this interval not displayed.        CBC w/Diff   Recent Labs      02/28/18   0420  02/27/18   0250  02/26/18   0515   WBC  8.5  15.5*  15.3*   RBC  2.74*  2.96*  3.32*   HGB  7.4*  8.1*  9.3*   HCT  23.6* 25.5*  28.5*   PLT  200  257  212   GRANS  67  81*  72   LYMPH  18*  13*  16*   EOS  8*  2  5     XR (Most Recent). CXR reviewed by me and compared with previous CXR     Results from Hospital Encounter encounter on 02/22/18   XR CHEST PORT   Narrative EXAM: Chest portable    INDICATION: Pulmonary edema    COMPARISON: Multiple recent chest films, most recent 2/27/2018    _______________    FINDINGS:    AP portable chest film was performed. Film was labeled supine similar to  previous exam. Right jugular central line is present. This is stable with tip  extending into the right atrium and possibly into the inferior vena cava. Further slight decreased bilateral pleural effusions and probable lower lobe  areas of atelectasis. _______________         Impression IMPRESSION:      1. Stable position right jugular central line. 2. Further decreased bilateral pleural effusions and lower lobe areas of  atelectasis. High complexity decision making was performed during the evaluation of this patient at high risk for decompensation with multiple organ involvement     Above mentioned total time spent on reviewing the case/medical record/data/notes/EMR/patient examination/documentation/coordinating care with nurse/consultants, exclusive of procedures with complex decision making performed and > 50% time spent in face to face evaluation.     Angelique Burk MD  2/28/2018

## 2018-02-28 NOTE — PROGRESS NOTES
1211 .TRANSFER - OUT REPORT:    Verbal report given to Khushi ALAN(name) on Freada Alpers  being transferred to (unit) for routine progression of care       Report consisted of patients Situation, Background, Assessment and   Recommendations(SBAR). Information from the following report(s) SBAR, Kardex and MAR was reviewed with the receiving nurse. Lines:   Triple Lumen 02/22/18 Right Internal jugular (Active)   Central Line Being Utilized Yes 2/27/2018  8:00 PM   Criteria for Appropriate Use Hemodynamically unstable, requiring monitoring lines, vasopressors, or volume resuscitation 2/27/2018  8:00 PM   Site Assessment Clean, dry, & intact 2/28/2018  7:00 AM   Infiltration Assessment 0 2/28/2018  7:00 AM   Affected Extremity/Extremities Color distal to insertion site pink (or appropriate for race); Pulses palpable;Range of motion performed 2/27/2018  8:00 PM   Date of Last Dressing Change 02/25/18 2/28/2018  7:00 AM   Dressing Status Clean, dry, & intact 2/27/2018  8:00 PM   Dressing Type Disk with Chlorhexadine gluconate (CHG) 2/27/2018  8:00 PM   Action Taken Open ports on tubing capped 2/27/2018  4:00 PM   Proximal Hub Color/Line Status White; Infusing 2/27/2018  8:00 PM   Positive Blood Return (Medial Site) Yes 2/27/2018  8:00 PM   Medial Hub Color/Line Status Blue; Infusing 2/27/2018  8:00 PM   Positive Blood Return (Lateral Site) Yes 2/27/2018  8:00 PM   Distal Hub Color/Line Status Brown;Capped;Flushed;Patent 2/27/2018  8:00 PM   Positive Blood Return (Site #3) Yes 2/27/2018  8:00 PM   Alcohol Cap Used Yes 2/27/2018  8:00 PM        Opportunity for questions and clarification was provided.       Patient transported with:   Registered Nurse    Gave report to Jacinto Lenz RN  All personal items are with patient on bed  Spoke with  charge nurse and patient can have CVL

## 2018-02-28 NOTE — PROGRESS NOTES
Problem: Falls - Risk of  Goal: *Absence of Falls  Document Bahman Fall Risk and appropriate interventions in the flowsheet.    Outcome: Progressing Towards Goal  Fall Risk Interventions:  Mobility Interventions: Assess mobility with egress test    Mentation Interventions: Adequate sleep, hydration, pain control    Medication Interventions: Assess postural VS orthostatic hypotension    Elimination Interventions: Call light in reach

## 2018-02-28 NOTE — PROGRESS NOTES
0800/1200/1600 Performed gilberto care    1800 Dr. Julius Mcclain agreed patient is to have patient transferred to tele  Dr. Julius Mcclain wants CVL to remain if possible    1900 . Bedside and Verbal shift change report given to Slime ALAN (oncoming nurse) by July Grider (offgoing nurse). Report included the following information SBAR, Kardex and MAR.

## 2018-02-28 NOTE — PROGRESS NOTES
[facility]: This patient meets P & T approved criteria for conversion from IV to oral therapy for the following medication:     Pantoprazole 40 mg IV q24h for SUP prophylaxis was discontinued and Pantoprazole 40 mg granules PO  or daily was ordered. If the patient no longer meets all criteria for oral therapy, the pharmacist will switch back to IV therapy. Thanks.

## 2018-02-28 NOTE — PROGRESS NOTES
Problem: Self Care Deficits Care Plan (Adult)  Goal: *Acute Goals and Plan of Care (Insert Text)  Outcome: Resolved/Met Date Met: 02/28/18  Occupational Therapy EVALUATION/discharge    Patient: Lynette Purcell (74 y.o. female)  Date: 2/28/2018  Primary Diagnosis: Sepsis associated hypotension (HCC)        Precautions: Fall, Skin  PLOF: Pt unable to provide PLOF. ASSESSMENT AND RECOMMENDATIONS:  Based on the objective data described below, the patient presents with impairments with regard to bed mobility, activity tolerance and independence in ADLs. Pt supine on arrival, alert to self only. Pt resisting all movements, noncompliant t/o session. Resting BLEs during bed mobility. Total A to EOB; constant support. Standing not assessed due to poor sitting balance. Pt c/o of \"being cold\" t/o session. Pt returned supine, HOB elevated, wedges in place & SCD applied to RLE (DVT to LLE). Needs within reach; reoriented to call bell. Not appropriate for skilled therapy due to confusion & noncompliance with therapy; recommend SNF upon d/c. Skilled occupational therapy is not indicated at this time. Discharge Recommendations: [facility]ill[facility]  Further Equipment Recommendations for Discharge: TBD at next level of care     SUBJECTIVE:   Patient stated I'm so cold.     OBJECTIVE DATA SUMMARY:   History reviewed. No pertinent past medical history. History reviewed. No pertinent surgical history. Barriers to Learning/Limitations: None  Compensate with: visual, verbal, tactile, kinesthetic cues/model    G CODES:  Self Care  Current  CL= 60-79%   Goal  CL= 60-79%   D/C  CL= 60-79%. The severity rating is based on the Other Functional Assessment, MMT, ROM    Eval Complexity: History: MEDIUM Complexity : Expanded review of history including physical, cognitive and psychosocial  history ;  Examination: MEDIUM Complexity : 3-5 performance deficits relating to physical, cognitive , or psychosocial skils that result in activity limitations and / or participation restrictions; Decision Making:MEDIUM Complexity : Patient may present with comorbidities that affect occupational performnce. Miniml to moderate modification of tasks or assistance (eg, physical or verbal ) with assesment(s) is necessary to enable patient to complete evaluation      Prior Level of Function/Home Situation: Pt unable to provide PLOF. Home Situation  Home Environment: Skilled nursing facility  One/Two Story Residence: One story  Living Alone: No  Support Systems: Skilled nursing facility  Patient Expects to be Discharged to[de-identified] Skilled nursing facility  Current DME Used/Available at Home: None  [x]     Right hand dominant   []     Left hand dominant    Cognitive/Behavioral Status:  Neurologic State: Alert  Orientation Level: Oriented to person;Disoriented to situation;Disoriented to time;Disoriented to place  Cognition: Decreased command following;Decreased attention/concentration  Safety/Judgement: Fall prevention     Skin: Bruising to BUEs  Edema: None noted (BUEs)    Vision/Perceptual:    Acuity: Able to read clock/calendar on wall without difficulty      Coordination:  Coordination: Within functional limits  Fine Motor Skills-Upper: Right Intact; Left Intact    Gross Motor Skills-Upper: Right Intact; Left Intact     Balance:  Sitting: Impaired  Sitting - Static: Poor (constant support)  Sitting - Dynamic: Poor (constant support)  Standing:  (not assessed)     Strength:  Strength: Generally decreased, functional (BUEs: 3+/5)    Range of Motion:  AROM: Generally decreased, functional (BUEs: approx 3/4 shoulder flex)    Functional Mobility and Transfers for ADLs:  Bed Mobility:  Supine to Sit: Total assistance  Sit to Supine: Maximum assistance; Total assistance;Assist x2    Transfers:  Sit to Stand:  (not assessed due to poor sitting balance)    ADL Assessment:  Feeding: Minimum assistance  Oral Facial Hygiene/Grooming: Minimum assistance  Bathing: Maximum assistance  Upper Body Dressing: Moderate assistance  Lower Body Dressing: Maximum assistance  Toileting: Total assistance (felix & FMS)    Cognitive Retraining  Safety/Judgement: Fall prevention    Pain:  Pre-treatment pain level: 0/10  Post treatment pain level: 0/10  Pain Scale 1: Numeric (0 - 10)  Pain Intensity 1: 0    Activity Tolerance:  Fair  Please refer to the flowsheet for vital signs taken during this treatment. After treatment:   []  Patient left in no apparent distress sitting up in chair  [x]  Patient left in no apparent distress in bed  [x]  Call bell left within reach  [x]  Nursing notified  []  Caregiver present  []  Bed alarm activated    COMMUNICATION/EDUCATION: Pt educated on role of OT and POC. Needs reinforcement. Communication/Collaboration:  []      Home safety education was provided and the patient/caregiver indicated understanding. [x]      Patient/family have participated as able and agree with findings and recommendations. []      Patient is unable to participate in plan of care at this time.     Lorene Azevedo MS OTR/L  Time Calculation: 17 mins

## 2018-02-28 NOTE — PROGRESS NOTES
Infectious Disease Follow-up     Admit Date: 2/22/2018    Current abx Prior abx    levaquin 2/22-6  Vanco/Zosyn 2/22-5      ASSESSMENT: -- RECOMMENDATIONS       Shock- ? Sepsis  - presented with fever, AMS and hypotension  - elevated cr and WBC but normal LA, neg UA and CXr for infiltrates  - influenza screen negative  - off pressors, WBC normal now  - more cardiogenic than septic -> levofloxacin po x 1 more day       Abd pain- RLQ  - ? UTI/Pyelo vs appendix  - abd US: no evid cholecystitis, mild thickening cecal wall, echogenic liver  - pain improved -> monitor   KILO on admission  - suspect sec to dehydration, hypotension  - resolved - avoid nephrotoxic meds    CHF- pt with elevated BNP and mildly elevated trops  - Cxr with congestion  - Echo with EF 77-38% with diastolic dysfunction - per others   Leg edema- left > right  - +DVT    SVT      Malpositioned Rt IJ line - Mx per others   Dementia      Hypothyroid- TSH of 5.37 and fT4 of 0.9          MICROBIOLOGY:   2/22               Blcx x2 IP                        ucx IP     LINES/DRAINS:      Rt IJ line 2/22    Active Hospital Problems    Diagnosis Date Noted    Underweight 02/25/2018    Acute deep vein thrombosis (DVT) of femoral vein of left lower extremity (HCC) 02/23/2018    Shock ([address] 02/22/2018     Urine may be a source of infection for possible septic shock; however, SvO2 should be checked in light of abnormal troponin and pro-BNP, suspicious for cardiogenic shock      Acquired hypothyroidism 02/22/2018    KILO (acute kidney injury) ([address] 02/22/2018    Elevated troponin 02/22/2018    Acute cystitis without hematuria 02/22/2018    Dementia with behavioral disturbance 02/22/2018    Elevated brain natriuretic peptide (BNP) level 02/22/2018    Supraventricular tachycardia ([address] 02/22/2018         Subjective: Interval notes reviewed. Transferred out of ICU. Appears comfortable. Denies shortness of breath or pain. Afebrile.   Left leg still edematous.     Current Facility-Administered Medications   Medication Dose Route Frequency    levoFLOXacin (LEVAQUIN) tablet 500 mg  500 mg Oral Q24H    apixaban (ELIQUIS) tablet 10 mg  10 mg Oral BID    Followed by   Jomar Garcia ON 3/7/2018] apixaban (ELIQUIS) tablet 5 mg  5 mg Oral BID    furosemide (LASIX) injection 40 mg  40 mg IntraVENous DAILY    potassium chloride (KLOR-CON) packet 40 mEq  40 mEq Oral NOW    levothyroxine (SYNTHROID) tablet 75 mcg  75 mcg Oral 6am    ondansetron (ZOFRAN) injection 4 mg  4 mg IntraVENous Q6H PRN    sodium chloride (NS) flush 5-10 mL  5-10 mL IntraVENous PRN    aspirin delayed-release tablet 81 mg  81 mg Oral DAILY    atorvastatin (LIPITOR) tablet 40 mg  40 mg Oral DAILY    sodium chloride (NS) flush 5-10 mL  5-10 mL IntraVENous Q8H    sodium chloride (NS) flush 5-10 mL  5-10 mL IntraVENous PRN    acetaminophen (TYLENOL) tablet 650 mg  650 mg Oral Q6H PRN    pantoprazole (PROTONIX) 40 mg in sodium chloride 10 mL injection  40 mg IntraVENous DAILY         Objective:     Visit Vitals    /66    Pulse 83    Temp 96.7 °F (35.9 °C)    Resp 19    Ht 5' 5\" (1.651 m)    Wt 56 kg (123 lb 7.3 oz)    SpO2 100%    Breastfeeding No    BMI 20.54 kg/m2       Temp (24hrs), Av.4 °F (36.3 °C), Min:96.5 °F (35.8 °C), Max:98 °F (36.7 °C)      GEN: frail 80year-old  lady in no acute distress  HEENT: no scleral icterus, no oral lesions  CHEST: no crackles or wheezes,   CVS:regular, systolic murmur at base  ABD: soft, nontender  EXT:left leg 2+ edema/ecchymoses    Labs: Results:   Chemistry Recent Labs      18   0420  18   1600  18   0250   18   0515   GLU  76   --   95   --   107*   NA  148*   --   147*   --   147*   K  3.4*  3.9  3.6   < >  3.7   CL  117*   --   118*   --   117*   CO2  21   --   21   --   22   BUN  7   --   11   --   16   CREA  0.39*   --   0.53*   --   0.77   CA  7.1*   --   7.1*   --   7.2*   AGAP  10   --   8   --   8 BUCR  18   --   21*   --   21*   AP   --    --   155*   --   165*   TP   --    --   4.8*   --   4.8*   ALB   --    --   1.8*   --   1.4*   GLOB   --    --   3.0   --   3.4   AGRAT   --    --   0.6*   --   0.4*    < > = values in this interval not displayed. CBC w/Diff Recent Labs      02/28/18   0420  02/27/18   0250  02/26/18   0515   WBC  8.5  15.5*  15.3*   RBC  2.74*  2.96*  3.32*   HGB  7.4*  8.1*  9.3*   HCT  23.6*  25.5*  28.5*   PLT  200  257  212   GRANS  67  81*  72   LYMPH  18*  13*  16*   EOS  8*  2  5            Microbiology Results  No results for input(s): SDES, CULT in the last 72 hours.     Juno Pack MD  February 28, 2018  [facility] AT THE [facility]  [phone]

## 2018-02-28 NOTE — PROGRESS NOTES
1910  SBAR report taken from [facility] 30. 1915  PTT resulted at  1830 = 89.3    Which means that there will be no change in the dosage, thus we will check this level again in the am. Brushed her teeth and tongue and had her rinse well. 2129  Spoke with pharmacy in regards to vancomycin hat was due at 1900, the only bag that is on the floor is for 1000mg. Pharmacist states that there will be a bag coming up to the unit soon. 0200  Washed patient up and changed her gown. Turned her onto her right side, repositioned her gilberto wick after performing per care. 0630  Changed pad underneath her after incontinence of urine from the gilberto wick needing repositioned. Replaced the gilberto wick with a new one, turned her onto her left side. 0720  Bedside SBAR report given to incoming RN [employer].

## 2018-03-01 NOTE — PROGRESS NOTES
Notified [facility] rep, Renaee Koyanagi, of the referral for home hospice eval.  Pt krystian dc to [address] [place] [address]. Care Management Interventions  PCP Verified by CM: Yes  Mode of Transport at Discharge:  Other (see comment)  Transition of Care Consult (CM Consult): Jaywm[place]helle: Yes  Plan discussed with Pt/Family/Caregiver: Yes  Freedom of Choice Offered: Yes  Discharge Location  Discharge Placement: Home with hospice

## 2018-03-01 NOTE — MANAGEMENT PLAN
Discharge Planning    0920: Called Chely at [facility] and she will have someone notify Care Management when someone is coming to assess pt for return to her [address]  1030: Abdiaziz Grier at [facility] they cannot accept back till pt goes to SNF. She states pt able to walk short distances prior to admission to hospital and now appears full assist. Pt has Fecal management system that needs to be removed as well. 1035: Called karine at [facility] and they will review  1045: Palliative reviewing for possible Hospice. Abdiaziz Grier at [facility] said they will accept back with Hospice, but would need day or 2 to get equipment and have it set up  1130: Notified by Paz Hillner from palliative that she has spoken with son and they are agreeable to [facility]  1200: Updated Dr Gilma Abernathy with Hospice decision. He states fecal management and IV antibiotic can be stopped.  Notified him with Hospice set up and equipment delivery, it may be tomorrow before able to d/c. Dr Gilma Abernathy and acknowledge and stated this is fine  1400: Updated [employer], Abdiaziz Grier and Alexandr Benito  1600: Transport set for [facility] in morning per request of Alexandr Benito at [address] RN BSN  Outcomes Manager  Pager # [phone]

## 2018-03-01 NOTE — PROGRESS NOTES
Infectious Disease Follow-up     Admit Date: 2/22/2018    Current abx Prior abx    None 3/1 - 0  Vanco/Zosyn 2/22-5 levaquin 2/22-7      ASSESSMENT: -- RECOMMENDATIONS       Shock- ? Sepsis  - presented with fever, AMS and hypotension  - elevated cr and WBC but normal LA, neg UA and CXR for infiltrates  - influenza screen negative  - off pressors, WBC normal now  - more cardiogenic than septic -> monitor off abx  -> will be available as needed       Abd pain- RLQ  - ? UTI/Pyelo vs appendix  - abd US: no evid cholecystitis, mild thickening cecal wall, echogenic liver  - pain improved -> monitor   KILO on admission  - suspect sec to dehydration, hypotension  - resolved - avoid nephrotoxic meds    CHF- pt with elevated BNP and mildly elevated trops  - Cxr with congestion  - Echo with EF 91-06% with diastolic dysfunction - per others   Leg edema- left > right  - +DVT    SVT      Malpositioned Rt IJ line - Mx per others   Dementia      Hypothyroid- TSH of 5.37 and fT4 of 0.9          MICROBIOLOGY:   2/22               Blcx x2 IP                        ucx IP     LINES/DRAINS:      Rt IJ line 2/22    Active Hospital Problems    Diagnosis Date Noted    Underweight 02/25/2018    Acute deep vein thrombosis (DVT) of femoral vein of left lower extremity (HCC) 02/23/2018    Shock ([address] 02/22/2018     Urine may be a source of infection for possible septic shock; however, SvO2 should be checked in light of abnormal troponin and pro-BNP, suspicious for cardiogenic shock      Acquired hypothyroidism 02/22/2018    KILO (acute kidney injury) ([address] 02/22/2018    Elevated troponin 02/22/2018    Acute cystitis without hematuria 02/22/2018    Dementia with behavioral disturbance 02/22/2018    Elevated brain natriuretic peptide (BNP) level 02/22/2018    Supraventricular tachycardia ([address] 02/22/2018         Subjective: Interval notes reviewed. Lying comfortably in bed hugging her stuffed cat.    Denies pain or shortness of breath.   Afebrile    Current Facility-Administered Medications   Medication Dose Route Frequency    apixaban (ELIQUIS) tablet 10 mg  10 mg Oral BID    Followed by   Sincere Bowen ON 3/7/2018] apixaban (ELIQUIS) tablet 5 mg  5 mg Oral BID    pantoprazole (PROTONIX) granules for oral suspension 40 mg  40 mg Oral ACB    levothyroxine (SYNTHROID) tablet 75 mcg  75 mcg Oral 6am    ondansetron (ZOFRAN) injection 4 mg  4 mg IntraVENous Q6H PRN    sodium chloride (NS) flush 5-10 mL  5-10 mL IntraVENous PRN    aspirin delayed-release tablet 81 mg  81 mg Oral DAILY    atorvastatin (LIPITOR) tablet 40 mg  40 mg Oral DAILY    sodium chloride (NS) flush 5-10 mL  5-10 mL IntraVENous Q8H    sodium chloride (NS) flush 5-10 mL  5-10 mL IntraVENous PRN    acetaminophen (TYLENOL) tablet 650 mg  650 mg Oral Q6H PRN         Objective:     Visit Vitals    /74 (BP 1 Location: Left arm, BP Patient Position: At rest)    Pulse 78    Temp 97.7 °F (36.5 °C)    Resp 18    Ht 5' 5\" (1.651 m)    Wt 65.5 kg (144 lb 4.8 oz)    SpO2 98%    Breastfeeding No    BMI 24.01 kg/m2       Temp (24hrs), Av.1 °F (36.7 °C), Min:97.7 °F (36.5 °C), Max:98.6 °F (37 °C)      GEN: frail 80year-old  lady in no acute distress  HEENT: no scleral icterus, no oral lesions  CHEST: no crackles or wheezes,   CVS:regular, systolic murmur at base  ABD: soft, nontender  EXT:left leg 1+ edema/ecchymoses    Labs: Results:   Chemistry Recent Labs      18   0358  18   0420  18   1600  18   0250   GLU  70*  76   --   95   NA  146*  148*   --   147*   K  3.5  3.4*  3.9  3.6   CL  113*  117*   --   118*   CO2  21  21   --   21   BUN  6*  7   --   11   CREA  0.48*  0.39*   --   0.53*   CA  7.5*  7.1*   --   7.1*   AGAP  12  10   --   8   BUCR  13  18   --   21*   AP  193*   --    --   155*   TP  4.9*   --    --   4.8*   ALB  1.7*   --    --   1.8*   GLOB  3.2   --    --   3.0   AGRAT  0.5*   --    --   0.6*      CBC w/Diff Recent Labs      03/01/18   0358  02/28/18   0420  02/27/18   0250   WBC  10.9  8.5  15.5*   RBC  3.07*  2.74*  2.96*   HGB  8.4*  7.4*  8.1*   HCT  26.3*  23.6*  25.5*   PLT  249  200  257   GRANS  67  67  81*   LYMPH  19*  18*  13*   EOS  5  8*  2            Microbiology Results  No results for input(s): SDES, CULT in the last 72 hours.     Karly Waters MD  March 1, 2018  [facility] AT THE [facility]  [phone]

## 2018-03-01 NOTE — PROGRESS NOTES
08:15- Assessment completed- see charting. Continue to monitor. Call light in reach. No c/o at this time. Continues with fecal management system draining small amount of liquid green stool. Has gilberto wick in place for urine incontinence. Requires total care- poor appetite-poor intake. 12:30- No change in condition. Continue to monitor. 13:30- Plan for discharge tomorrow to hospice. 15:00- Discontinued CVL- sterile technique using gauze and occlusive dressing. Minimal bleeding.    19:30-Report to oncoming nurse given

## 2018-03-01 NOTE — CONSULTS
Pulmonary follow up    S:  Feels \"cold\" and weak, no dyspnea, Chest pains, N/V/D, no fever, chills or hemoptysis. O: VS stable  Lungs: bilateral faint rhonchi  Heart: reg  Abd: soft, BS+  Legs: sarcopenia    Asst:    Shock - Resolved. Pt is off pressors. D/w [facility]. They dont have any further recs. Sepsis and Poss RLL pneumonia- WBC count is normal today. Blood and urine cx show no growth. Pt was on Zosyn, Levaquin, vanco. Taper abx per ID  Systolic CHF with EF 22-01% with B/l Pleural effusions - Pt's Cxr is improving but BNP is still markedly elevated. Diurese with lasix  Left leg DVT - Start Eliquis. D/w Josie Weinstein and he is ok with it. Pt is complete assist per PT and at the NH  Mildly elevated TI - Cardiology consulted and they think its related to stress cardiomyopathy. Pt is on ASA, Lipitor. No further recs per cardiology  Anemia - Hb is drifting down.  No overt bleeding      Plan:  Continue Therapy  PT eval  Dr Ronette Phalen will cover tomorrow

## 2018-03-01 NOTE — HOSPICE
Hospice referral is appreciated. Chart review in progress. [facility] Telephone call to Alisha Farooq the patient's son to discuss hospice services. His phone numbers are [phone] Fax [phone]. Alisha Farooq is primary decision maker while his sister Shelby Simpson is recovering from a brain injury. He lives in [facility], criteria,benefits, admission process, IDT. Telephone call to [address] Pt is in  room 1126 Spoke with Madelaine Duverney The  Admissions coordinator or patient's nurse not available. Left my contact information so that I may coordinate care and assess needs. [address] Telephone call from Gianni at [facility] ordered stat for delivery this evening.

## 2018-03-01 NOTE — PROGRESS NOTES
[facility]: [phone] ([phone])  [facility]: [phone]   [facility]: [phone]    Patient Name: Mandie Hardin  YOB: 1928    Date of Initial Consult: 2/27/18  Reason for Consult: care decisions  Requesting Provider: Dr. Erum Snyder  Primary Care Physician: Maryuri Toledo MD      SUMMARY:   Mandie Hardin is a 80y.o. year old with a past history of dementia and hypothyroidism, who was admitted on 2/22/2018 from [address] [place] [address] with a diagnosis of sepsis. Current medical issues leading to Palliative Medicine involvement include: 81 y/o [facility] resident with dementia, admitted to ICU with sepsis, on pressors. Palliative care is consulted for goals of care discussion. PALLIATIVE DIAGNOSES:   1. ACP/goals of care discussion  2. Dementia  3. shock  4. DVT  5. Elevated BNP  6. debility       PLAN:   1. Patient seen this am, awake, alert, her stuffed animal cat \" cupcake\" at her side. Comfortable appearing. Confused. She allowed me to feed her but oral intake was poor. 2. Advanced care plan discussions patient is not able to participate in conversation. We do not have an AMD on file. Daughter Susana Lopez was care giver before Trace Alanis moved to [facility]. Medical decision making is a consensus between children, however daughter sustained a brain injury after a recent fall, a not able to participate in decisions, son Martha Pierce lives in [place], per Mimi Zheng, has basically been estranged from family, he does not own a phone, not great way to contact him, however Mimi Zheng will try through neighbors. Mimi Zheng is by default the medical decision maker. Spoke with Mimi Zheng via phone today, Trace Sees according to nurses at [facility] has had a significant decline in her level functioning, not sure if she will return to her same PTA level. Mimi Zheng is well aware of the progression of dementia and its effects. Upon d/c he would like for his mother to receive hospice services at the [facility].  He understands treatment will now be directed toward symptom management and she will be maintained in her home ( [facility]) until she passes. He has signed a POST indicating DNR/DNI /comfort measures, and no feeding tube. He also gave me permission to speak to Cyntiha Butler ( pt's niece), updated her via phone and she will update daughter Prashant Blount. Hospice referral placed. Can continue all maintenance  oral medications at [facility]. 3. Dementia: Discussed with Doreen Don effects of dementia, he is aware it is a progressive diease. Last saw his mother in June ( he lives in [place]) she was able to perform many of her ADL's at that time, but not safe to live alone, and at times would not know him. He understands the disease has progressed since that time, as she has required [facility] care in a dementia unit. 4. DVT: LLE, present on admission, started on Eliquis, by attending team. She is mostly bed bound, possibly w/c   5. Elevated BNP: cath placed yesterday, o/p 325 of urine. May need diuresing PCCM and cards on board. 6. Debility: RN at [facility] tells us her functional level has greatly decreased, now not ambulatory / shared this with family. Unsure if she will return to her base line. Likely more bed/ chair bound. Very poor po, took 2 teaspoons of apple juice, declined solid food, 3 teaspoons of ensure this am. Shared with Doreen Don, her oral intake is very low, no feeding tubes, allow feeds for comfort. 7. Initial consult note routed to primary continuity provider  8.  Communicated plan of care with: Palliative IDT, son Doeren Don, [place], Nin[place]ul Rolene Learn:  Patient/Health Care Proxy Stated Goals: Comfort      TREATMENT PREFERENCES:   Code Status: DNR    Advance Care Planning:  Advance Care Planning 2/27/2018   Patient's Healthcare Decision Maker is: Legal Next of Marvel Araiza   Primary Decision Maker Name Alfa Magdaleno   Primary Decision Maker Phone Number [phone] or [phone]   Primary Decision Maker Relationship to Patient Adult child   Confirm Advance Directive -   Patient Would Like to Complete Advance Directive Unable         Artificially Administered Nutrition: No feeding tube     Other:  As far as possible, the palliative care team has discussed with patient / health care proxy about goals of care / treatment preferences for patient. HISTORY:     History obtained from: chart    CHIEF COMPLAINT:fatigue/weakness/loose stools    HPI/SUBJECTIVE:    The patient is:   [] Verbal and participatory  [x] Non-participatory due to:   Confusion/dementia     Clinical Pain Assessment (nonverbal scale for nonverbal patients): Clinical Pain Assessment  Severity: 0     Activity (Movement): Lying quietly, normal position    Duration: for how long has pt been experiencing pain (e.g., 2 days, 1 month, years)  Frequency: how often pain is an issue (e.g., several times per day, once every few days, constant)     FUNCTIONAL ASSESSMENT:     Palliative Performance Scale (PPS):  PPS: 30      ECOG Status : Completely disabled     PSYCHOSOCIAL/SPIRITUAL SCREENING:      Any spiritual / Restorationist concerns:  [] Yes /  [] No    Caregiver Burnout:  [] Yes /  [] No /  [x] No Caregiver Present      Anticipatory grief assessment:   [] Normal  / [] Maladaptive        REVIEW OF SYSTEMS:     Positive and pertinent negative findings in ROS are noted above in HPI. The following systems were [] reviewed / [x] unable to be reviewed as noted in HPI  Other findings are noted below. Systems: constitutional, ears/nose/mouth/throat, respiratory, gastrointestinal, genitourinary, musculoskeletal, integumentary, neurologic, psychiatric, endocrine. Positive findings noted below.   Modified ESAS Completed by: provider   Fatigue: 6       Pain: 0         Anorexia: 9 Dyspnea: 0     Constipation: No     Stool Occurrence(s): 1        PHYSICAL EXAM:     Wt Readings from Last 3 Encounters:   03/01/18 65.5 kg (144 lb 4.8 oz)     Blood pressure 125/74, pulse 78, temperature 97.7 °F (36.5 °C), resp. rate 18, height 5' 5\" (1.651 m), weight 65.5 kg (144 lb 4.8 oz), SpO2 98 %, not currently breastfeeding. Pain:  Pain Scale 1: Numeric (0 - 10)  Pain Intensity 1: 0     Pain Location 1: Head (states thi si not a new pain)  Pain Orientation 1: Right  Pain Description 1:  (unabel to describe)  Pain Intervention(s) 1: Medication (see MAR)  Last bowel movement: x 1     Constitutional: pleasantly confused elderly frail appearing female lying in bed with her stuffed animal cat  Eyes: pupils equal, anicteric  ENMT: no nasal discharge, moist mucous membranes  Respiratory: breathing not labored, symmetric  Musculoskeletal: no deformity  Skin: warm, dry  Neurologic: alert,oriented to herself, calm        HISTORY:     Principal Problem:    Shock ([address] (2/22/2018)      Overview: Urine may be a source of infection for possible septic shock; however,       SvO2 should be checked in light of abnormal troponin and pro-BNP,       suspicious for cardiogenic shock    Active Problems:    Acquired hypothyroidism (2/22/2018)      KILO (acute kidney injury) ([address] (2/22/2018)      Elevated troponin (2/22/2018)      Acute cystitis without hematuria (2/22/2018)      Dementia with behavioral disturbance (2/22/2018)      Elevated brain natriuretic peptide (BNP) level (2/22/2018)      Supraventricular tachycardia ([address] (2/22/2018)      Acute deep vein thrombosis (DVT) of femoral vein of left lower extremity ([address] (2/23/2018)      Underweight (2/25/2018)      History reviewed. No pertinent past medical history. History reviewed. No pertinent surgical history. Family History   Problem Relation Age of Onset    Family history unknown: Yes     History reviewed, no pertinent family history.   Social History   Substance Use Topics    Smoking status: Not on file    Smokeless tobacco: Not on file    Alcohol use Not on file     No Known Allergies   Current Facility-Administered Medications   Medication Dose Route Frequency    apixaban (ELIQUIS) tablet 10 mg  10 mg Oral BID    Followed by   Latoya Leaks ON 3/7/2018] apixaban (ELIQUIS) tablet 5 mg  5 mg Oral BID    pantoprazole (PROTONIX) granules for oral suspension 40 mg  40 mg Oral ACB    levothyroxine (SYNTHROID) tablet 75 mcg  75 mcg Oral 6am    ondansetron (ZOFRAN) injection 4 mg  4 mg IntraVENous Q6H PRN    sodium chloride (NS) flush 5-10 mL  5-10 mL IntraVENous PRN    aspirin delayed-release tablet 81 mg  81 mg Oral DAILY    atorvastatin (LIPITOR) tablet 40 mg  40 mg Oral DAILY    sodium chloride (NS) flush 5-10 mL  5-10 mL IntraVENous Q8H    sodium chloride (NS) flush 5-10 mL  5-10 mL IntraVENous PRN    acetaminophen (TYLENOL) tablet 650 mg  650 mg Oral Q6H PRN        LAB AND IMAGING FINDINGS:     Lab Results   Component Value Date/Time    WBC 10.9 03/01/2018 03:58 AM    HGB 8.4 (L) 03/01/2018 03:58 AM    PLATELET 280 93/03/0284 03:58 AM     Lab Results   Component Value Date/Time    Sodium 146 (H) 03/01/2018 03:58 AM    Potassium 3.5 03/01/2018 03:58 AM    Chloride 113 (H) 03/01/2018 03:58 AM    CO2 21 03/01/2018 03:58 AM    BUN 6 (L) 03/01/2018 03:58 AM    Creatinine 0.48 (L) 03/01/2018 03:58 AM    Calcium 7.5 (L) 03/01/2018 03:58 AM    Magnesium 2.0 03/01/2018 03:58 AM    Phosphorus 2.3 (L) 02/27/2018 02:50 AM      Lab Results   Component Value Date/Time    AST (SGOT) 54 (H) 03/01/2018 03:58 AM    Alk.  phosphatase 193 (H) 03/01/2018 03:58 AM    Protein, total 4.9 (L) 03/01/2018 03:58 AM    Albumin 1.7 (L) 03/01/2018 03:58 AM    Globulin 3.2 03/01/2018 03:58 AM     Lab Results   Component Value Date/Time    INR 3.0 (H) 03/01/2018 03:58 AM    Prothrombin time 30.7 (H) 03/01/2018 03:58 AM    aPTT 88.9 (H) 02/28/2018 04:20 AM      No results found for: IRON, FE, TIBC, IBCT, PSAT, FERR   No results found for: PH, PCO2, PO2  No components found for: [place]   Lab Results   Component Value Date/Time    CK 89 02/22/2018 10:30 PM    CK - MB 1.6 02/22/2018 10:30 PM              Total time: 65 minutes   Counseling / coordination time, spent as noted above: 45  > 50% counseling / coordination: yes with RN, Kb Don    Prolonged service was provided for  [x]30 min   []75 min in face to face time in the presence of the patient, spent as noted above. Time Start:  1020  Time End:   1130  Note: this can only be billed with  (initial) or 21 [phone] (follow up). If multiple start / stop times, list each separately.

## 2018-03-01 NOTE — PROGRESS NOTES
Progress Note      Patient: Latricia Ruiz               Sex: female          DOA: 2/22/2018       YOB: 1928      Age:  80 y.o.        LOS:  LOS: 7 days               Subjective:       Awake and alert, slow to verbalize.     Objective:      Visit Vitals    /74 (BP 1 Location: Left arm, BP Patient Position: At rest)    Pulse 78    Temp 97.7 °F (36.5 °C)    Resp 18    Ht 5' 5\" (1.651 m)    Wt 65.5 kg (144 lb 4.8 oz)    SpO2 98%    Breastfeeding No    BMI 24.01 kg/m2       Physical Exam:  Pt is awake and denies any sob   Heart reg rate and rhythm   Lungs fair breath sounds heard   Abdomen soft and nontender   Extremities left leg dvt   Neuro dementia     Lab/Data Reviewed:  BMP:   Lab Results   Component Value Date/Time     (H) 03/01/2018 03:58 AM    K 3.5 03/01/2018 03:58 AM     (H) 03/01/2018 03:58 AM    CO2 21 03/01/2018 03:58 AM    AGAP 12 03/01/2018 03:58 AM    GLU 70 (L) 03/01/2018 03:58 AM    BUN 6 (L) 03/01/2018 03:58 AM    CREA 0.48 (L) 03/01/2018 03:58 AM    GFRAA >60 03/01/2018 03:58 AM    GFRNA >60 03/01/2018 03:58 AM     CMP:   Lab Results   Component Value Date/Time     (H) 03/01/2018 03:58 AM    K 3.5 03/01/2018 03:58 AM     (H) 03/01/2018 03:58 AM    CO2 21 03/01/2018 03:58 AM    AGAP 12 03/01/2018 03:58 AM    GLU 70 (L) 03/01/2018 03:58 AM    BUN 6 (L) 03/01/2018 03:58 AM    CREA 0.48 (L) 03/01/2018 03:58 AM    GFRAA >60 03/01/2018 03:58 AM    GFRNA >60 03/01/2018 03:58 AM    CA 7.5 (L) 03/01/2018 03:58 AM    MG 2.0 03/01/2018 03:58 AM    ALB 1.7 (L) 03/01/2018 03:58 AM    TP 4.9 (L) 03/01/2018 03:58 AM    GLOB 3.2 03/01/2018 03:58 AM    AGRAT 0.5 (L) 03/01/2018 03:58 AM    SGOT 54 (H) 03/01/2018 03:58 AM    ALT 27 03/01/2018 03:58 AM     CBC:   Lab Results   Component Value Date/Time    WBC 10.9 03/01/2018 03:58 AM    HGB 8.4 (L) 03/01/2018 03:58 AM    HCT 26.3 (L) 03/01/2018 03:58 AM     03/01/2018 03:58 AM           Assessment/Plan Principal Problem:    Shock ([address] (2/22/2018)      Overview: Urine may be a source of infection for possible septic shock; however,       SvO2 should be checked in light of abnormal troponin and pro-BNP,       suspicious for cardiogenic shock    Active Problems:    Acquired hypothyroidism (2/22/2018)      KILO (acute kidney injury) ([address] (2/22/2018)      Elevated troponin (2/22/2018)      Acute cystitis without hematuria (2/22/2018)      Dementia with behavioral disturbance (2/22/2018)      Elevated brain natriuretic peptide (BNP) level (2/22/2018)      Supraventricular tachycardia ([address] (2/22/2018)      Acute deep vein thrombosis (DVT) of femoral vein of left lower extremity ([address] (2/23/2018)      Underweight (2/25/2018)        Plan: Continue to monitor, home tomorrow with hospice

## 2018-03-01 NOTE — PROGRESS NOTES
Transportation at Discharge:  3/2/18 -Expected D/C    Transport Company: [employer] ([place])  Reference number:none    Estimated pick-up time: TBD pt is on will call for 3/2/18    Method of Transport: stretcher  Insurance Info: [facility]  Authorization: Not Required    Made D/C transportation arrangements at the request of Outcomes Manager Waqas Rowley.         Mary Leigh, Care- ext [phone]

## 2018-03-01 NOTE — PROGRESS NOTES
Problem: Mobility Impaired (Adult and Pediatric)  Goal: *Acute Goals and Plan of Care (Insert Text)  Physical Therapy Goals  Initiated 2/27/2018 and to be accomplished within 3 day trial.   If appropriate continue per plan of care to accomplish goals within 7 days. 1.  Patient will maintain eyes open for greater than 90% of session to allow for active participation in mobility training. 2.  Patient will follow 90% of commands to maximize safety and active participation in mobility training. 3.  Patient will move from supine to sit and sit to supine  in bed with minimal assistance/contact guard assist.     4.  Patient will maintain seated at edge of bed for 8 min with supervision/set-up to prepare for out of bed activity. 5.  Patient will perform sit to stand with supervision/set-up. 6.  Patient will transfer from bed to chair and chair to bed with supervision/set-up using the least restrictive device. Outcome: Progressing Towards Goal  physical Therapy TREATMENT    Patient: Luzmaria Celestin (12 y.o. female)  Date: 3/1/2018  Diagnosis: Sepsis associated hypotension (HCC) Shock [address]       Precautions: Fall, Skin  Chart, physical therapy assessment, plan of care and goals were reviewed. PLOF: Unknown    ASSESSMENT:  Day 1 of 3 day trial.  Agreeable to seated EOB. Oriented to self. Disoriented to place and time. Max A for supine to sit. Seated EOB with strong glean to left; corrects to midline with max A. Requiring constant support at this time. Requests to return to sidelying in bed once sitting less than one minute. Encouraged to continue upright posture with no demonstration. Max A for sit to supine. Once sidelying closes eyes and refused further mobility. Completed rolling for pad adjustment with max encouragement and max A. Supine with HOB elevated and call bell in lap at end of session.    EDUCATION: bed mobility, transfers, balance, cognition  Progression toward goals:        No progress towards goals at this time; Day 1 of 3 day trial.      PLAN:  Patient continues to benefit from skilled intervention to address the above impairments. Continue treatment per established plan of care. Discharge Recommendations:  Skilled Nursing Facility/ long term care  Further Equipment Recommendations for Discharge:  hospital bed     SUBJECTIVE:   Patient stated I don't know where I am.    OBJECTIVE DATA SUMMARY:   Critical Behavior:  Neurologic State: Alert  Orientation Level: Oriented to person, Disoriented to time, Disoriented to place, Disoriented to situation  Cognition: Decreased command following, Decreased attention/concentration  Safety/Judgement: Fall prevention, Decreased insight into deficits    [facility] Sitting Balance Scale 0/5  0: Pt performs 25% or less of sitting activity (Max assist) CN, 100% impaired. 1: Pt supports self with upper extremities but requires therapist assistance. Pt performs 25-50% of effort (Mod assist) CM, 80% to <100% impaired. 1+: Pt supports self with upper extremities but requires therapist assistance. Pt performs >50% effort. (Min assist). CL, 60% to <80% impaired. 2: Pt supports self independently with both upper extremities. CL, 60% to <80% impaired. 2+: Pt support self independently with 1 upper extremity. CK, 40% to <60% impaired. 3: Pt sits without upper extremity support for up to 30 seconds. CK, 40% to <60% impaired. 3+: Pt sits without upper extremity support for 30 seconds or greater. CJ, 20% to <40% impaired. 4: Pt moves and returns trunkal midpoint 1-2 inches in one plane. CJ, 20% to <40% impaired. 4+: Pt moves and returns trunkal midpoint 1-2 inches in multiple planes. CI, 1% to <20% impaired. 5: Pt moves and returns trunkal midpoint in all planes greater than 2 inches. CH, 0% impaired. G CODE:Mobility [id number] Current  CN= 100%   Goal  CL= 60-79%.   The severity rating is based on the Other [facility] Sitting Balance Scale 0/5  Functional Mobility Training:  Bed Mobility:  Rolling: Maximum assistance  Supine to Sit: Maximum assistance  Sit to Supine: Maximum assistance  Transfers:  Sit to Stand:  (unable )  Balance:  Sitting: Impaired  Sitting - Static: Poor (constant support)  Sitting - Dynamic: Poor (constant support)  Standing:  (unable )  Ambulation/Gait Training:  Gait Description (WDL):  (unable)  Neuro Re-Education:  Sitting EOB 1 min  Therapeutic Exercises:   Rolling x2  Pain:  Pre treatment pain level: 0  Post treatment pain level: 0  Pain Scale 1: Numeric (0 - 10)  Pain Intensity 1: 0  Activity Tolerance:   Poor     After treatment:   Patient left in no apparent distress in bed  Call bell left within reach  Nursing notified    Fermin Rodriguez PT   Time Calculation: 10 mins

## 2018-03-02 NOTE — PROGRESS NOTES
Notified by [facility] nurse, Maryjane Lozano, the DME will be delivered this morning to [facility] @ 1030. Notified her the pt will be transported @ 1200. Bárbara Palma CM aware.

## 2018-03-02 NOTE — DISCHARGE SUMMARY
Discharge Summary    Patient: Milton Flores               Sex: female          DOA: 2/22/2018         YOB: 1928      Age:  80 y.o.        LOS:  LOS: 8 days                Admit Date: 2/22/2018    Discharge Date: 3/2/2018    Discharge Medications:     Current Discharge Medication List      START taking these medications    Details   bumetanide (BUMEX) 0.5 mg tablet Take 3 Tabs by mouth daily. Qty: 90 Tab, Refills: 0      potassium chloride SR (K-TAB) 20 mEq tablet Take 1 Tab by mouth daily. Qty: 30 Tab, Refills: 0      LORazepam (INTENSOL) 2 mg/mL concentrated solution Take 0.5 mL by mouth every one (1) hour as needed. Max Daily Amount: 24 mg. Qty: 30 mL, Refills: 0    Associated Diagnoses: Shock ([address]      morphine (ROXANOL) 100 mg/5 mL (20 mg/mL) concentrated solution Take 0.5 mL by mouth every one (1) hour as needed. Max Daily Amount: 240 mg.  Qty: 30 mL, Refills: 0    Associated Diagnoses: Shock (HCC)      scopolamine (TRANSDERM-SCOP) 1 mg over 3 days pt3d 1 Patch by TransDERmal route every seventy-two (72) hours as needed for Other (SECRETIONS). Qty: 10 Patch, Refills: 0    Associated Diagnoses: Shock ([address]      naloxone 2 mg/actuation spry Use 1 spray intranasally into 1 nostril. Use a new Narcan nasal spray for subsequent doses and administer into alternating nostrils. May repeat every 2 to 3 minutes as needed. Qty: 4 Each, Refills: 0         CONTINUE these medications which have NOT CHANGED    Details   levothyroxine (SYNTHROID) 75 mcg tablet Take 75 mcg by mouth Daily (before breakfast). polyethylene glycol (MIRALAX) 17 gram packet Take 17 g by mouth daily.          STOP taking these medications       aspirin delayed-release 81 mg tablet Comments:   Reason for Stopping:         atorvastatin (LIPITOR) 40 mg tablet Comments:   Reason for Stopping:         MAGNESIUM OXIDE, BULK, Comments:   Reason for Stopping:         MULTIVIT-MINERALS/FERROUS FUM (MULTI VITAMIN PO) Comments:   Reason for Stopping:         memantine (NAMENDA) 10 mg tablet Comments:   Reason for Stopping:         memantine (NAMENDA) 10 mg tablet Comments:   Reason for Stopping:         memantine (NAMENDA) 10 mg tablet Comments:   Reason for Stopping:         calcium-vitamin D (OYSTER SHELL) 500 mg(1,250mg) -200 unit per tablet Comments:   Reason for Stopping:         mirtazapine (REMERON) 7.5 mg tablet Comments:   Reason for Stopping: Follow-up: Hospice    Discharge Condition: Stable    Activity: Activity as tolerated    Diet: Comfort feeding    Labs:  Labs: Results:       Chemistry Recent Labs      03/01/18 0358 02/28/18 0420 02/27/18   1600   GLU  70*  76   --    NA  146*  148*   --    K  3.5  3.4*  3.9   CL  113*  117*   --    CO2  21  21   --    BUN  6*  7   --    CREA  0.48*  0.39*   --    CA  7.5*  7.1*   --    AGAP  12  10   --    BUCR  13  18   --    AP  193*   --    --    TP  4.9*   --    --    ALB  1.7*   --    --    GLOB  3.2   --    --    AGRAT  0.5*   --    --       CBC w/Diff Recent Labs      03/01/18 0358 02/28/18 0420   WBC  10.9  8.5   RBC  3.07*  2.74*   HGB  8.4*  7.4*   HCT  26.3*  23.6*   PLT  249  200   GRANS  67  67   LYMPH  19*  18*   EOS  5  8*      Cardiac Enzymes No results for input(s): CPK, CKND1, TIMMY in the last 72 hours. No lab exists for component: CKRMB, TROIP   Coagulation Recent Labs      03/02/18   0453  03/01/18 0358 02/28/18   0420 02/27/18   1830   PTP  27.8*  30.7*  17.0*   < >   --    INR  2.7*  3.0*  1.4*   < >   --    APTT   --    --   88.9*   --   89.3*    < > = values in this interval not displayed. Lipid Panel No results found for: CHOL, CHOLPOCT, CHOLX, CHLST, CHOLV, 578788, HDL, LDL, LDLC, DLDLP, 045574, VLDLC, VLDL, TGLX, TRIGL, TRIGP, TGLPOCT, CHHD, CHHDX   BNP No results for input(s): BNPP in the last 72 hours.    Liver Enzymes Recent Labs      03/01/18   0358   TP  4.9*   ALB  1.7*   AP  193*   SGOT  54*      Thyroid Studies Lab Results Component Value Date/Time    TSH 5.37 (H) 02/22/2018 10:30 AM          Imaging:      Right Leg:-  Deep venous thrombosis:           No  Superficial venous thrombosis:    No  Deep venous insufficiency:        Not examined  Superficial venous insufficiency: Not examined     Left Leg:-  Deep venous thrombosis:           Yes  Proximal extent of thrombus:      Common Femoral  Superficial venous thrombosis:    Yes  Deep venous insufficiency:        Not examined  Superficial venous insufficiency: Not examined    US abdomen  1. Echogenic liver suggesting hepatic steatosis. 2. Biliary sludge without evidence of acute cholecystitis. 3. Echogenic renal parenchyma suggesting chronic medical renal disease. 4. Nonspecific mild thickening of the cecal wall may represent active  inflammation. The appendix is not visualized. 5. Mild abdominal ascites.       Consults: Cardiology, Infectious Disease and Pulmonary/Critical Care    Treatment Team: Treatment Team: Attending Provider: León Lange MD; Consulting Provider: Louise Olsen MD; Care Manager: Nitza Shah; Utilization Review: Jomar Chauhan RN; Consulting Provider: Maegan Huynh MD; Care Manager: Ida Still RN; Consulting Provider: DIANA Scherer; Occupational Therapist: Kel James OT; Care Manager: Avinash Smith RN    Significant Diagnostic Studies: labs:   Recent Results (from the past 24 hour(s))   PROTHROMBIN TIME + INR    Collection Time: 03/02/18  4:53 AM   Result Value Ref Range    Prothrombin time 27.8 (H) 11.5 - 15.2 sec    INR 2.7 (H) 0.8 - 1.2           Discharge diagnoses:    Problem List as of 3/2/2018  Date Reviewed: 2/23/2018          Codes Class Noted - Resolved    Underweight ICD-10-CM: R63.6  ICD-9-CM: 783.22  2/25/2018 - Present        Acute deep vein thrombosis (DVT) of femoral vein of left lower extremity ([facility] ICD-10-CM: B32.175  ICD-9-CM: 453.41  2/23/2018 - Present        * (Principal)Shock ([facility] ICD-10-CM: R57.9  ICD-9-CM: 785.50  2/22/2018 - Present    Overview Signed 2/22/2018  6:37 PM by Justin Duffy MD     Urine may be a source of infection for possible septic shock; however, SvO2 should be checked in light of abnormal troponin and pro-BNP, suspicious for cardiogenic shock             Acquired hypothyroidism ICD-10-CM: E03.9  ICD-9-CM: 244.9  2/22/2018 - Present        KILO (acute kidney injury) ([facility] ICD-10-CM: N17.9  ICD-9-CM: 584.9  2/22/2018 - Present        Elevated troponin ICD-10-CM: R74.8  ICD-9-CM: 790.6  2/22/2018 - Present        Acute cystitis without hematuria ICD-10-CM: N30.00  ICD-9-CM: 595.0  2/22/2018 - Present        Dementia with behavioral disturbance (Chronic) ICD-10-CM: F03.91  ICD-9-CM: 294.21  2/22/2018 - Present        Elevated brain natriuretic peptide (BNP) level ICD-10-CM: R79.89  ICD-9-CM: 790.99  2/22/2018 - Present        Supraventricular tachycardia ([facility] ICD-10-CM: I47.1  ICD-9-CM: 427.89  2/22/2018 - Present            Hospital Course:   Major issues addressed during hospitalization outlined  below. Dariusz hWite is a 80 y.o. female with hypothyroidism who presents to the ED from nursing home. Hx is limited 2/2 lack of records in EMR, patient condition and no current caretaker at bedside. But per ED   Notes patient was brought in via EMS for fatigue and weakness for unknown duration. Patient apparently comes from nursing home. Upon arrival patient was found to be febrile (101.5) and hypotensive susequently was given IVF however refractory to this thus was started on pressors. Upon my arrival all patient states to me is that she is cold. Patient will be admitted to the ICU for further treatment and evaluation    Please see EMR for full details  In summary:  Patient was admitted to ICU for Septic shock 2/2 UTI vs hypovolemic shock. She was treated with pressors and eventually improved to point of transfering to floor.   PT followed for her debility swelling in leg led to PVL and findings of DVT. Patient was started on anticoagulation. Course was compicated by KILO and acute on chronic diastolic HF. Patient was treated with diuretic for CHF and pleural effusions. Eliquis was started for DVT. Cards followed for possible takosubos CM/CHF. Pulm followed as well. Palliative was consulted and patient made hospice. Plan for dc today to SNF with hospice.   Kassandra Sherwood MD  March 2, 2018        Total time spent 40 minutes

## 2018-03-02 NOTE — ROUTINE PROCESS
Bedside, Verbal and Written shift change report given to Sebastian Sharpe RN (oncoming nurse) by Abel Alicea RN (offgoing nurse). Report included the following information SBAR, Kardex, Intake/Output, MAR, Recent Results, Med Rec Status, Procedure Summary and Cardiac Rhythm NSR.     0745 - Shift assessment completed. Pt alert and oriented x1 to self. No respiratory distress noted. No c/o pain reported. Call bell within reach, bed in low position. Will continue to monitor.        1053 - Verbal report on pt given to Yolanda Bourne RN at [facility]. Report consisted of patients Situation, Background, Assessment and Recommendations (SBAR). Opportunity for questions and clarification was provided. Luana Solorzano is unaware if pt has received flu vaccine this season. 1100 - Pt refusing flu vaccine at this time. I have reviewed discharge instructions with the patient. The patient verbalized understanding. Discharge medications reviewed with patient and appropriate educational materials and side effects teaching were provided. Pt's FMS and gilberto wick removed. IV catheter discontinued intact. Site without signs and symptoms of complications. Dressing and pressure applied. Patient discharged without removing armband and transfered to another healthcare acute, sub acute , or extended care facility. Informed of privacy risks if armband lost or stolen. 1220 - Pt discharged to [facility] via stretcher with [employer]. All pt belongings went with her.

## 2018-03-02 NOTE — ANCILLARY DISCHARGE INSTRUCTIONS
Patient and/or next of kin has been given the [facility] Important Message From Medicare About Your Rights\" letter and all questions were answered.

## 2018-03-02 NOTE — PROGRESS NOTES
conducted a Follow up consultation and Spiritual Assessment for Natasha Le, who is a 80 y.o.,female. The  provided the following Interventions:  Continued the relationship of care and support. Listened empathically. Offered prayer and assurance of continued prayer on patients behalf. Chart reviewed. The following outcomes were achieved:  Patient expressed gratitude for pastoral care visit. Assessment:  There are no further spiritual or Buddhism issues which require Spiritual Care Services interventions at this time. Plan:  Chaplains will continue to follow and will provide pastoral care on an as needed/requested basis.  recommends bedside caregivers page  on duty if patient shows signs of acute spiritual or emotional distress.      [address] [facility]   Staff [address] [facility]   [phone]

## 2018-03-02 NOTE — PROGRESS NOTES
[facility]: [phone] ([phone])  formerly [facility]: [phone]   [facility]: [phone]    Patient Name: Claudia Hernandez  YOB: 1928    Date of Initial Consult: 2/27/18  Reason for Consult: care decisions  Requesting Provider: Dr. Teresita Carpenter  Primary Care Physician: Joel Melvin MD      SUMMARY:   Claudia Hernandez is a 80y.o. year old with a past history of dementia and hypothyroidism, who was admitted on 2/22/2018 from [address] [place] [address] with a diagnosis of sepsis. Current medical issues leading to Palliative Medicine involvement include: 81 y/o [facility] resident with dementia, admitted to ICU with sepsis, on pressors. Palliative care is consulted for goals of care discussion. PALLIATIVE DIAGNOSES:   1. ACP/goals of care discussion  2. Dementia  3. shock  4. DVT  5. Elevated BNP  6. debility       PLAN:   1. Seen as follow up this am. Asleep when I entered, awoke easily, she again today allowed me to feed her breakfast although po remains poor. 2. Advanced care plan discussions patient is not able to participate in conversation. We do not have an AMD on file. Daughter Rosario Woodard was care giver before Troy King moved to [facility]. Medical decision making is a consensus between children, however daughter sustained a brain injury after a recent fall, a not able to participate in decisions, son [place] lives in [place], per Regina Galeano, has basically been estranged from family, he does not own a phone, not great way to contact him, however Regina Galeano will try through neighbors. Regina Galeano is by default the medical decision maker. Goals of care and care decisions are set, POST has been signed for DNR/DNI comfort measures, no feeding tubes  3. Dementia: son aware this a progressive terminal disease. She will return today on hospice services at [facility]  4. DVT: LLE, present on admission, started on Eliquis, by attending team  5.  Debility: RN at [facility] tells us her functional level has greatly decreased, now not ambulatory / shared this with family. Will likely be bed / chair bound and need assistance with all ADl's. 6. Initial consult note routed to primary continuity provider  7. Communicated plan of care with: Palliative IDT    GOALS OF CARE:  Patient/Health Care Proxy Stated Goals: Comfort      TREATMENT PREFERENCES:   Code Status: DNR    Advance Care Planning:  Advance Care Planning 2/27/2018   Patient's Healthcare Decision Maker is: Legal Next of Marvel Araiza   Primary Decision Maker Name Rosalia Galvin   Primary Decision Maker Phone Number [phone] or [phone]   Primary Decision Maker Relationship to Patient Adult child   Confirm Advance Directive -   Patient Would Like to Complete Advance Directive Unable         Artificially Administered Nutrition: No feeding tube     Other:  As far as possible, the palliative care team has discussed with patient / health care proxy about goals of care / treatment preferences for patient.      HISTORY:     History obtained from: chart    CHIEF COMPLAINT:fatigue/weakness/loose stools    HPI/SUBJECTIVE:    The patient is:   [] Verbal and participatory  [x] Non-participatory due to:   Confusion/dementia     Clinical Pain Assessment (nonverbal scale for nonverbal patients): Clinical Pain Assessment  Severity: 0     Activity (Movement): Lying quietly, normal position    Duration: for how long has pt been experiencing pain (e.g., 2 days, 1 month, years)  Frequency: how often pain is an issue (e.g., several times per day, once every few days, constant)     FUNCTIONAL ASSESSMENT:     Palliative Performance Scale (PPS):  PPS: 30      ECOG Status : Completely disabled     PSYCHOSOCIAL/SPIRITUAL SCREENING:      Any spiritual / Yazidism concerns:  [] Yes /  [] No    Caregiver Burnout:  [] Yes /  [] No /  [x] No Caregiver Present      Anticipatory grief assessment:   [] Normal  / [] Maladaptive        REVIEW OF SYSTEMS:     Positive and pertinent negative findings in ROS are noted above in HPI. The following systems were [] reviewed / [x] unable to be reviewed as noted in HPI  Other findings are noted below. Systems: constitutional, ears/nose/mouth/throat, respiratory, gastrointestinal, genitourinary, musculoskeletal, integumentary, neurologic, psychiatric, endocrine. Positive findings noted below. Modified ESAS Completed by: provider   Fatigue: 6       Pain: 0         Anorexia: 9 Dyspnea: 0     Constipation: No     Stool Occurrence(s): 1        PHYSICAL EXAM:     Wt Readings from Last 3 Encounters:   03/02/18 49.2 kg (108 lb 8 oz)     Blood pressure 138/81, pulse 87, temperature 97.1 °F (36.2 °C), resp. rate 18, height 5' 5\" (1.651 m), weight 49.2 kg (108 lb 8 oz), SpO2 92 %, not currently breastfeeding.   Pain:  Pain Scale 1: Numeric (0 - 10)  Pain Intensity 1: 0     Pain Location 1: Head (states thi si not a new pain)  Pain Orientation 1: Right  Pain Description 1:  (unabel to describe)  Pain Intervention(s) 1: Medication (see MAR)  Last bowel movement: FMS which will be d/c on d/c    Constitutional: pleasant, confused taking comfort with her stuffed cat \" cup cake\"   Eyes: pupils equal, anicteric  ENMT: no nasal discharge, moist mucous membranes  Respiratory: breathing not labored, symmetric  Musculoskeletal: no deformity  Skin: warm, dry  Neurologic: alert,oriented to herself, calm        HISTORY:     Principal Problem:    Shock ([facility] (2/22/2018)      Overview: Urine may be a source of infection for possible septic shock; however,       SvO2 should be checked in light of abnormal troponin and pro-BNP,       suspicious for cardiogenic shock    Active Problems:    Acquired hypothyroidism (2/22/2018)      KILO (acute kidney injury) ([facility] (2/22/2018)      Elevated troponin (2/22/2018)      Acute cystitis without hematuria (2/22/2018)      Dementia with behavioral disturbance (2/22/2018)      Elevated brain natriuretic peptide (BNP) level (2/22/2018)      Supraventricular tachycardia ([facility] (2/22/2018)      Acute deep vein thrombosis (DVT) of femoral vein of left lower extremity ([facility] (2/23/2018)      Underweight (2/25/2018)      History reviewed. No pertinent past medical history. History reviewed. No pertinent surgical history. Family History   Problem Relation Age of Onset    Family history unknown: Yes     History reviewed, no pertinent family history.   Social History   Substance Use Topics    Smoking status: Not on file    Smokeless tobacco: Not on file    Alcohol use Not on file     No Known Allergies   Current Facility-Administered Medications   Medication Dose Route Frequency    morphine (ROXANOL) 100 mg/5 mL (20 mg/mL) concentrated solution 10 mg  10 mg Oral Q1H PRN    scopolamine (TRANSDERM-SCOP) 1 mg over 3 days 1 Patch  1 Patch TransDERmal Q72H PRN    LORazepam (INTENSOL) 2 mg/mL oral concentrate 1 mg  1 mg Oral Q1H PRN    apixaban (ELIQUIS) tablet 10 mg  10 mg Oral BID    Followed by   William Adler ON 3/7/2018] apixaban (ELIQUIS) tablet 5 mg  5 mg Oral BID    pantoprazole (PROTONIX) granules for oral suspension 40 mg  40 mg Oral ACB    levothyroxine (SYNTHROID) tablet 75 mcg  75 mcg Oral 6am    ondansetron (ZOFRAN) injection 4 mg  4 mg IntraVENous Q6H PRN    sodium chloride (NS) flush 5-10 mL  5-10 mL IntraVENous PRN    aspirin delayed-release tablet 81 mg  81 mg Oral DAILY    atorvastatin (LIPITOR) tablet 40 mg  40 mg Oral DAILY    sodium chloride (NS) flush 5-10 mL  5-10 mL IntraVENous Q8H    sodium chloride (NS) flush 5-10 mL  5-10 mL IntraVENous PRN    acetaminophen (TYLENOL) tablet 650 mg  650 mg Oral Q6H PRN        LAB AND IMAGING FINDINGS:     Lab Results   Component Value Date/Time    WBC 10.9 03/01/2018 03:58 AM    HGB 8.4 (L) 03/01/2018 03:58 AM    PLATELET 367 31/18/7976 03:58 AM     Lab Results   Component Value Date/Time    Sodium 146 (H) 03/01/2018 03:58 AM    Potassium 3.5 03/01/2018 03:58 AM    Chloride 113 (H) 03/01/2018 03:58 AM    CO2 21 03/01/2018 03:58 AM    BUN 6 (L) 03/01/2018 03:58 AM    Creatinine 0.48 (L) 03/01/2018 03:58 AM    Calcium 7.5 (L) 03/01/2018 03:58 AM    Magnesium 2.0 03/01/2018 03:58 AM    Phosphorus 2.3 (L) 02/27/2018 02:50 AM      Lab Results   Component Value Date/Time    AST (SGOT) 54 (H) 03/01/2018 03:58 AM    Alk. phosphatase 193 (H) 03/01/2018 03:58 AM    Protein, total 4.9 (L) 03/01/2018 03:58 AM    Albumin 1.7 (L) 03/01/2018 03:58 AM    Globulin 3.2 03/01/2018 03:58 AM     Lab Results   Component Value Date/Time    INR 2.7 (H) 03/02/2018 04:53 AM    Prothrombin time 27.8 (H) 03/02/2018 04:53 AM    aPTT 88.9 (H) 02/28/2018 04:20 AM      No results found for: IRON, FE, TIBC, IBCT, PSAT, FERR   No results found for: PH, PCO2, PO2  No components found for: [place]   Lab Results   Component Value Date/Time    CK 89 02/22/2018 10:30 PM    CK - MB 1.6 02/22/2018 10:30 PM              Total time: 15 minutes   Counseling / coordination time, spent as noted above: 10 minutes  > 50% counseling / coordination: yes    Prolonged service was provided for  []30 min   []75 min in face to face time in the presence of the patient, spent as noted above. Time Start:    Time End:     Note: this can only be billed with 05376 (initial) or 33184 (follow up). If multiple start / stop times, list each separately.

## 2018-03-02 NOTE — DISCHARGE INSTRUCTIONS
DISCHARGE SUMMARY from Nurse    PATIENT INSTRUCTIONS:    After general anesthesia or intravenous sedation, for 24 hours or while taking prescription Narcotics:  · Limit your activities  · Do not drive and operate hazardous machinery  · Do not make important personal or business decisions  · Do  not drink alcoholic beverages  · If you have not urinated within 8 hours after discharge, please contact your surgeon on call. Report the following to your surgeon:  · Excessive pain, swelling, redness or odor of or around the surgical area  · Temperature over 100.5  · Nausea and vomiting lasting longer than 4 hours or if unable to take medications  · Any signs of decreased circulation or nerve impairment to extremity: change in color, persistent  numbness, tingling, coldness or increase pain  · Any questions    What to do at Home:  Recommended activity: Activity as tolerated,     If you experience any of the following symptoms or signs of infection, pain, or any other concerns, please follow up with primary care physician. *  Please give a list of your current medications to your Primary Care Provider. *  Please update this list whenever your medications are discontinued, doses are      changed, or new medications (including over-the-counter products) are added. *  Please carry medication information at all times in case of emergency situations. These are general instructions for a healthy lifestyle:    No smoking/ No tobacco products/ Avoid exposure to second hand smoke  Surgeon General's Warning:  Quitting smoking now greatly reduces serious risk to your health.     Obesity, smoking, and sedentary lifestyle greatly increases your risk for illness    A healthy diet, regular physical exercise & weight monitoring are important for maintaining a healthy lifestyle    You may be retaining fluid if you have a history of heart failure or if you experience any of the following symptoms:  Weight gain of 3 pounds or more overnight or 5 pounds in a week, increased swelling in our hands or feet or shortness of breath while lying flat in bed. Please call your doctor as soon as you notice any of these symptoms; do not wait until your next office visit. Recognize signs and symptoms of STROKE:    F-face looks uneven    A-arms unable to move or move unevenly    S-speech slurred or non-existent    T-time-call 911 as soon as signs and symptoms begin-DO NOT go       Back to bed or wait to see if you get better-TIME IS BRAIN. Warning Signs of HEART ATTACK     Call 911 if you have these symptoms:   Chest discomfort. Most heart attacks involve discomfort in the center of the chest that lasts more than a few minutes, or that goes away and comes back. It can feel like uncomfortable pressure, squeezing, fullness, or pain.  Discomfort in other areas of the upper body. Symptoms can include pain or discomfort in one or both arms, the back, neck, jaw, or stomach.  Shortness of breath with or without chest discomfort.  Other signs may include breaking out in a cold sweat, nausea, or lightheadedness. Don't wait more than five minutes to call 911 - MINUTES MATTER! Fast action can save your life. Calling 911 is almost always the fastest way to get lifesaving treatment. Emergency Medical Services staff can begin treatment when they arrive -- up to an hour sooner than if someone gets to the hospital by car. The discharge information has been reviewed with the patient. The patient verbalized understanding. Discharge medications reviewed with the patient and appropriate educational materials and side effects teaching were provided. ___________________________________________________________________________________________________________________________________    Patient discharged without removing armband and transfered to another healthcare acute, sub acute , or extended care facility.   Informed of privacy risks if armband lost or merritt. MyChart Activation    Thank you for enrolling in [address] Please follow the instructions below to securely access your online medical record. [facility] allows you to send messages to your doctor, view your test results, renew your prescriptions, schedule appointments, and more. How Do I Sign Up? 1. In your internet browser, go to [email or web address]. 2. Click on the First Time User? Click Here link in the Sign In box. You will see the New Member Sign Up page. 3. Enter your [facility] Access Code exactly as it appears below. You will not need to use this code after youve completed the sign-up process. If you do not sign up before the expiration date, you must request a new code. [facility] Access Code: [email or web address]  Expires: 5/14/2018 12:06 PM     4. Enter the last four digits of your Social Security Number (xxxx) and Date of Birth (mm/dd/yyyy) as indicated and click Submit. You will be taken to the next sign-up page. 5. Create a [facility] ID. This will be your [facility] login ID and cannot be changed, so think of one that is secure and easy to remember. 6. Create a [facility] password. You can change your password at any time. 7. Enter your Password Reset Question and Answer. This can be used at a later time if you forget your password. 8. Enter your e-mail address. You will receive e-mail notification when new information is available in [address] 9. Click Sign Up. You can now view your medical record. Additional Information    Remember, [facility] is NOT to be used for urgent needs. For medical emergencies, dial 911. Now available from your iPhone and Android! Advance Directives: Care Instructions  Your Care Instructions  An advance directive is a legal way to state your wishes at the end of your life. It tells your family and your doctor what to do if you can no longer say what you want. There are two main types of advance directives.  You can change them any time that your wishes change. · A living will tells your family and your doctor your wishes about life support and other treatment. · A durable power of  for health care lets you name a person to make treatment decisions for you when you can't speak for yourself. This person is called a health care agent. If you do not have an advance directive, decisions about your medical care may be made by a doctor or a  who doesn't know you. It may help to think of an advance directive as a gift to the people who care for you. If you have one, they won't have to make tough decisions by themselves. Follow-up care is a key part of your treatment and safety. Be sure to make and go to all appointments, and call your doctor if you are having problems. It's also a good idea to know your test results and keep a list of the medicines you take. How can you care for yourself at home? · Discuss your wishes with your loved ones and your doctor. This way, there are no surprises. · Many states have a unique form. Or you might use a universal form that has been approved by many states. This kind of form can sometimes be completed and stored online. Your electronic copy will then be available wherever you have a connection to the Internet. In most cases, doctors will respect your wishes even if you have a form from a different state. · You don't need a  to do an advance directive. But you may want to get legal advice. · Think about these questions when you prepare an advance directive:  ¨ Who do you want to make decisions about your medical care if you are not able to? Many people choose a family member or close friend. ¨ Do you know enough about life support methods that might be used? If not, talk to your doctor so you understand. ¨ What are you most afraid of that might happen? You might be afraid of having pain, losing your independence, or being kept alive by machines. ¨ Where would you prefer to die? Choices include your home, a hospital, or a nursing home. ¨ Would you like to have information about hospice care to support you and your family? ¨ Do you want to donate organs when you die? ¨ Do you want certain Muslim practices performed before you die? If so, put your wishes in the advance directive. · Read your advance directive every year, and make changes as needed. When should you call for help? Be sure to contact your doctor if you have any questions. Where can you learn more? Go to http://nakul-quinten.info/. Enter R264 in the search box to learn more about \"Advance Directives: Care Instructions. \"  Current as of: September 24, 2016  Content Version: 11.4  © 6895-7675 [employer]. Care instructions adapted under license by [employer] (which disclaims liability or warranty for this information). If you have questions about a medical condition or this instruction, always ask your healthcare professional. [place] [address] any warranty or liability for your use of this information. Helping a Person With Alzheimer's Disease: Care Instructions  Your Care Instructions    Alzheimer's disease is a type of dementia. It affects memory, intelligence, judgment, language, and behavior. It is not clear what causes this disease. But it is the most common form of dementia in older adults. It may take many years to develop. Alzheimer's disease is different than mild memory loss that occurs with aging. Family members usually notice symptoms first. But the person also may realize that something is wrong. Follow-up care is a key part of your loved one's treatment and safety. Be sure to make and go to all appointments, and call your doctor if your loved one is having problems. It's also a good idea to know your loved one's test results and keep a list of the medicines he or she takes. How can you care for your loved one at home?   · Develop a routine. The person will feel less frustrated or confused with a clear, simple daily plan. Remind him or her about important facts and events. · Be patient. It may take longer for the person to complete a task than it used to. · Help the person eat a balanced diet. Serve plenty of whole grains, fruits, and vegetables every day. If the person is not eating well at mealtimes, give snacks at midmorning and in the afternoon. Offer drinks such as Boost, Ensure, or Sustacal if he or she is losing weight. · Encourage exercise. Walking and other activity may slow the decline of mental ability. Help the person keep an active mind. Encourage hobbies such as reading and crossword puzzles. · Take steps to help if the person is sundowning. This is the restless behavior and trouble with sleeping that may occur in late afternoon and at night. Try not to let the person nap during the day. Offer a glass of warm milk or caffeine-free tea before bedtime. · Ask family members and friends for help. You may need breaks where others can help care for the person. · Talk to the person's doctor about what resources are available for help in your area. · Review all of the person's medicines with his or her doctor. · For as long as the person is able, allow him or her to make decisions about activities, food, clothing, and other choices. Let the person be independent, even if tasks take more time or are not done perfectly. Tailor tasks to the person's abilities. For example, if cooking is no longer safe, ask for other help. He or she can help set the table or make simple dishes such as a salad. When the person needs help, offer it gently. Keeping safe  · Make your home (or the person's home) safe. Tack down rugs, and put no-slip tape in the tub. Install handrails, and put safety switches on stoves and appliances. Keep rooms free of clutter. Make sure walkways around furniture are clear.  Do not move furniture around, because the person may become confused. · Use locks on doors and cupboards. Lock up knives, scissors, medicines, cleaning supplies, and other dangerous things. · Do not let the person drive or cook if he or she cannot do it safely. A person with Alzheimer's should not drive unless he or she is able to pass an on-road driving test. Your state 's license bureau can do a driving test if there is any question. · Get medical alert jewelry for the person so you can be contacted if he or she wanders away. If possible, provide a safe place for wandering, such as an enclosed yard or garden. When should you call for help? Call 911 anytime you think you may need emergency care. For example, call if:  ? · A person who has Alzheimer's disease has disappeared. ? · A person who has Alzheimer's disease is seriously injured. ?Call your doctor now or seek immediate medical care if:  ? · The person you are caring for suddenly sees or hears things that are not there (hallucinates). ? · The person you are caring for has a sudden, drastic change in his or her behavior. ? Watch closely for changes in your loved one's health, and be sure to contact the doctor if:  ? · A person who has Alzheimer's disease gradually gets worse or has symptoms that could cause injury. ? · You need help caring for a person with Alzheimer's disease. ? · The person has problems with his or her medicine. Where can you learn more? Go to http://nakul-quinten.info/. Enter R172 in the search box to learn more about \"Helping a Person With Alzheimer's Disease: Care Instructions. \"  Current as of: May 12, 2017  Content Version: 11.4  © 9260-9770 [employer]. Care instructions adapted under license by [employer] (which disclaims liability or warranty for this information).  If you have questions about a medical condition or this instruction, always ask your healthcare professional. [place] any warranty or liability for your use of this information. Helping A Person With Dementia: Care Instructions  Your Care Instructions    Dementia is a loss of mental skills that affects daily life. It is different from mild memory loss that occurs with aging. Dementia can cause problems with memory, thinking clearly, and planning. It is different for everyone. But it usually gets worse slowly. Some people who have dementia can function well for a long time. But at some point it may become hard for the person to care for himself or herself. It can be upsetting to learn that a loved one has this condition. You may be afraid and worried about what will happen. You may wonder how you will care for the person. There is no cure for dementia. But medicine may be able to slow memory loss and improve thinking for a while. Other medicines may help with sleep, depression, and behavior changes. Dementia is different for everyone. In some cases, people can function well for a long time. You can help your loved one by making his or her home life easier and safer. You also need to take care of yourself. Caregiving can be stressful. But support is available to help you and give you a break when you need it. The Alzheimer's Association offers good information and support. If you are caring for someone with dementia, you can help make life safer and more comfortable. You can also help your loved one make decisions about future care. You may also want to bring up legal and financial issues. These are hard but important conversations to have. Follow-up care is a key part of your loved one's treatment and safety. Be sure to make and go to all appointments, and call your doctor if your loved one is having problems. It's also a good idea to know your loved one's test results and keep a list of the medicines he or she takes. How can you care for your loved one at home?   Taking care of the person  · If the person takes medicine for dementia, help him or her take it exactly as prescribed. Call the doctor if you notice any problems with the medicine. · Make a list of the person's medicines. Review it with all of his or her doctors. · Help the person eat a balanced diet. Serve plenty of whole grains, fruits, and vegetables every day. If the person is not hungry at mealtimes, give snacks at midmorning and in the afternoon. Offer drinks such as Boost, Ensure, or Sustacal if the person is losing weight. · Encourage exercise. Walking and other activities may slow the decline of mental ability. Help the person stay active mentally with reading, crossword puzzles, or other hobbies. · Take steps to help if the person is sundowning. This is the restless behavior and trouble with sleeping that may occur in late afternoon and at night. Try not to let the person nap during the day. Offer a glass of warm milk or caffeine-free tea before bedtime. · Develop a routine. Your loved one will feel less frustrated or confused with a clear, simple plan of what to do every day. · Be patient. A task may take the person longer than it used to. · For as long as he or she is able, allow your loved one to make decisions about activities, food, clothing, and other choices. Let him or her be independent, even if tasks take more time or are not done perfectly. Tailor tasks to the person's abilities. For example, if cooking is no longer safe, ask for other help. Your loved one can help set the table, or make simple dishes such as a salad. When the person needs help, offer it gently. Staying safe  · Make your home (or your loved one's home) safe. Tack down rugs, and put no-slip tape in the tub. Install handrails, and put safety switches on stoves and appliances. Keep rooms free of clutter. Make sure walkways around furniture are clear. Do not move furniture around, because the person may become confused. · Use locks on doors and cupboards.  Lock up knives, scissors, medicines, cleaning supplies, and other dangerous things. · Do not let the person drive or cook if he or she can't do it safely. A person with dementia should not drive unless he or she is able to pass an on-road driving test. Your state 's license bureau can do a driving test if there is any question. · Get medical alert jewelry for the person so that you can be contacted if he or she wanders away. If possible, provide a safe place for wandering, such as an enclosed yard or garden. Taking care of yourself  · Ask your doctor about support groups and other resources in your area. · Take care of your health. Be sure to eat healthy foods and get enough rest and exercise. · Take time for yourself. Respite services provide someone to stay with the person for a short time while you get out of the house for a few hours. · Make time for an activity that you enjoy. Read, listen to music, paint, do crafts, or play an instrument, even if it's only for a few minutes a day. · Spend time with family, friends, and others in your support system. When should you call for help? Call 911 anytime you think the person may need emergency care. For example, call if:  ? · The person who has dementia wanders away and you can't find him or her. ? · The person who has dementia is seriously injured. ?Call the doctor now or seek immediate medical care if:  ? · The person suddenly sees things that are not there (hallucinates). ? · The person has a sudden change in his or her behavior. ? Watch closely for changes in the person's health, and be sure to contact the doctor if:  ? · The person has symptoms that could cause injury. ? · The person has problems with his or her medicine. ? · You need more information to care for a person with dementia. ? · You need respite care so you can take a break. Where can you learn more? Go to http://nakul-quinten.info/.   Enter B515 in the search box to learn more about \"Helping A Person With Dementia: Care Instructions. \"  Current as of: May 12, 2017  Content Version: 11.4  © 1006-7013 [employer]. Care instructions adapted under license by [employer] (which disclaims liability or warranty for this information). If you have questions about a medical condition or this instruction, always ask your healthcare professional. [address] any warranty or liability for your use of this information. Fatigue: Care Instructions  Your Care Instructions    Fatigue is a feeling of tiredness, exhaustion, or lack of energy. You may feel fatigue because of too much or not enough activity. It can also come from stress, lack of sleep, boredom, and poor diet. Many medical problems, such as viral infections, can cause fatigue. Emotional problems, especially depression, are often the cause of fatigue. Fatigue is most often a symptom of another problem. Treatment for fatigue depends on the cause. For example, if you have fatigue because you have a certain health problem, treating this problem also treats your fatigue. If depression or anxiety is the cause, treatment may help. Follow-up care is a key part of your treatment and safety. Be sure to make and go to all appointments, and call your doctor if you are having problems. It's also a good idea to know your test results and keep a list of the medicines you take. How can you care for yourself at home? · Get regular exercise. But don't overdo it. Go back and forth between rest and exercise. · Get plenty of rest.  · Eat a healthy diet. Do not skip meals, especially breakfast.  · Reduce your use of caffeine, tobacco, and alcohol. Caffeine is most often found in coffee, tea, cola drinks, and chocolate. · Limit medicines that can cause fatigue. This includes tranquilizers and cold and allergy medicines. When should you call for help?   Watch closely for changes in your health, and be sure to contact your doctor if:  ? · You have new symptoms such as fever or a rash. ? · Your fatigue gets worse. ? · You have been feeling down, depressed, or hopeless. Or you may have lost interest in things that you usually enjoy. ? · You are not getting better as expected. Where can you learn more? Go to http://nakul-quinten.info/. Enter Q487 in the search box to learn more about \"Fatigue: Care Instructions. \"  Current as of: March 20, 2017  Content Version: 11.4  © 2140-9665 [employer]. Care instructions adapted under license by [employer] (which disclaims liability or warranty for this information). If you have questions about a medical condition or this instruction, always ask your healthcare professional. [address] any warranty or liability for your use of this information. Hypothyroidism: Care Instructions  Your Care Instructions    You have hypothyroidism, which means that your body is not making enough thyroid hormone. This hormone helps your body use energy. If your thyroid level is low, you may feel tired, be constipated, have an increase in your blood pressure, or have dry skin or memory problems. You may also get cold easily, even when it is warm. Women with low thyroid levels may have heavy menstrual periods. A blood test to find your thyroid-stimulating hormone (TSH) level is used to check for hypothyroidism. A high TSH level may mean that you have low thyroid. When your body is not making enough thyroid hormone, TSH levels rise in an effort to make the body produce more. The treatment for hypothyroidism is to take thyroid hormone pills. You should start to feel better in 1 to 2 weeks. But it can take several months to see changes in the TSH level. You will need regular visits with your doctor to make sure you have the right dose of medicine. Most people need treatment for the rest of their lives.  You will need to see your doctor regularly to have blood tests and to make sure you are doing well. Follow-up care is a key part of your treatment and safety. Be sure to make and go to all appointments, and call your doctor if you are having problems. It's also a good idea to know your test results and keep a list of the medicines you take. How can you care for yourself at home? · Take your thyroid hormone medicine exactly as prescribed. Call your doctor if you think you are having a problem with your medicine. Most people do not have side effects if they take the right amount of medicine regularly. ¨ Take the medicine 30 minutes before breakfast, and do not take it with calcium, vitamins, or iron. ¨ Do not take extra doses of your thyroid medicine. It will not help you get better any faster, and it may cause side effects. ¨ If you forget to take a dose, do NOT take a double dose of medicine. Take your usual dose the next day. · Tell your doctor about all prescription, herbal, or over-the-counter products you take. · Take care of yourself. Eat a healthy diet, get enough sleep, and get regular exercise. When should you call for help? Call 911 anytime you think you may need emergency care. For example, call if:  ? · You passed out (lost consciousness). ? · You have severe trouble breathing. ? · You have a very slow heartbeat (less than 60 beats a minute). ? · You have a low body temperature (95°F or below). ?Call your doctor now or seek immediate medical care if:  ? · You feel tired, sluggish, or weak. ? · You have trouble remembering things or concentrating. ? · You do not begin to feel better 2 weeks after starting your medicine. ? Watch closely for changes in your health, and be sure to contact your doctor if you have any problems. Where can you learn more? Go to http://nakul-quinten.info/. Enter G640 in the search box to learn more about \"Hypothyroidism: Care Instructions. \"  Current as of: May 12, 2017  Content Version: 11.4  © 5555-7022 [employer]. Care instructions adapted under license by [employer] (which disclaims liability or warranty for this information). If you have questions about a medical condition or this instruction, always ask your healthcare professional. [address] any warranty or liability for your use of this information.

## 2018-03-02 NOTE — MANAGEMENT PLAN
0800:Pt medical transport at 9am on this Friday morning. Please call report to [address] [place] [address] at [phone]  0900: Notified by Bhupendra Jansen at [address] [place] [address] that an issue came about with equipment delivery and pt bed has been taken down. Called Transport and delayed transport till 12pm. [employer], spoke with Brant and she investigated and was notified equipment now set for delivery at 56. Notified Bhupendra Jansen at [facility].   0521: Faxed scripts and summaries to Jody S Meron Leblanc RN BSN  Outcomes Manager  Pager # [phone]

## 2018-03-26 VITALS — HEART RATE: 90 BPM | OXYGEN SATURATION: 87 % | TEMPERATURE: 98.2 F

## 2018-03-29 ENCOUNTER — HOME CARE VISIT (OUTPATIENT)
Dept: HOSPICE | Facility: HOSPICE | Age: 83
End: 2018-03-29
Payer: MEDICARE

## 2021-04-29 NOTE — PALLIATIVE CARE
Spoke with patient's niece Raghu Ridley via phone. The patient is  with 3 adult children. Medical decision making defaults to her three children. Daughter Saray Tellez is in rehab following brain surgery and son Ketan Monroe does not have a phone. Son Claude Patel lives in [place]. Pepito's work number  is [phone] and home  number is [phone]. Palliative care has arranged a meeting with Claude Patel via phone at 7 AM tomorrow. Ketoconazole Counseling:   Patient counseled regarding improving absorption with orange juice.  Adverse effects include but are not limited to breast enlargement, headache, diarrhea, nausea, upset stomach, liver function test abnormalities, taste disturbance, and stomach pain.  There is a rare possibility of liver failure that can occur when taking ketoconazole. The patient understands that monitoring of LFTs may be required, especially at baseline. The patient verbalized understanding of the proper use and possible adverse effects of ketoconazole.  All of the patient's questions and concerns were addressed.

## 2024-09-18 NOTE — ED NOTES
Dr. Rosaline Flynn made aware of bp 70/56. Pt supine. IVF infusing. Will continue to monitor & obtain a manual bp. (M6) obeys commands